# Patient Record
Sex: FEMALE | Race: WHITE | Employment: FULL TIME | URBAN - METROPOLITAN AREA
[De-identification: names, ages, dates, MRNs, and addresses within clinical notes are randomized per-mention and may not be internally consistent; named-entity substitution may affect disease eponyms.]

---

## 2017-02-19 ENCOUNTER — APPOINTMENT (OUTPATIENT)
Dept: GENERAL RADIOLOGY | Age: 22
End: 2017-02-19
Payer: MEDICARE

## 2017-02-19 ENCOUNTER — HOSPITAL ENCOUNTER (EMERGENCY)
Age: 22
Discharge: HOME | End: 2017-02-19
Payer: MEDICARE

## 2017-02-19 VITALS
RESPIRATION RATE: 18 BRPM | SYSTOLIC BLOOD PRESSURE: 112 MMHG | WEIGHT: 105 LBS | HEIGHT: 63 IN | BODY MASS INDEX: 18.61 KG/M2 | OXYGEN SATURATION: 98 % | TEMPERATURE: 98.9 F | HEART RATE: 72 BPM | DIASTOLIC BLOOD PRESSURE: 79 MMHG

## 2017-02-19 DIAGNOSIS — J18.9 PNEUMONIA DUE TO ORGANISM: Primary | ICD-10-CM

## 2017-02-19 LAB — S PYO AG THROAT QL: NEGATIVE

## 2017-02-19 PROCEDURE — 71020 XR CHEST STANDARD TWO VW: CPT

## 2017-02-19 PROCEDURE — 87880 STREP A ASSAY W/OPTIC: CPT

## 2017-02-19 PROCEDURE — 87081 CULTURE SCREEN ONLY: CPT

## 2017-02-19 PROCEDURE — 99283 EMERGENCY DEPT VISIT LOW MDM: CPT

## 2017-02-19 RX ORDER — AZITHROMYCIN 250 MG/1
TABLET, FILM COATED ORAL
Qty: 1 PACKET | Refills: 0 | Status: SHIPPED | OUTPATIENT
Start: 2017-02-19 | End: 2017-07-08

## 2017-02-19 ASSESSMENT — PAIN DESCRIPTION - LOCATION: LOCATION: THROAT

## 2017-02-19 ASSESSMENT — PAIN DESCRIPTION - PAIN TYPE: TYPE: ACUTE PAIN

## 2017-02-19 ASSESSMENT — PAIN SCALES - GENERAL: PAINLEVEL_OUTOF10: 8

## 2017-02-19 ASSESSMENT — PAIN DESCRIPTION - DESCRIPTORS: DESCRIPTORS: SORE

## 2017-02-22 LAB — S PYO THROAT QL CULT: NORMAL

## 2017-06-01 ENCOUNTER — HOSPITAL ENCOUNTER (EMERGENCY)
Age: 22
Discharge: HOME | End: 2017-06-02
Attending: EMERGENCY MEDICINE
Payer: MEDICARE

## 2017-06-01 VITALS
RESPIRATION RATE: 20 BRPM | TEMPERATURE: 98.4 F | OXYGEN SATURATION: 98 % | BODY MASS INDEX: 19.32 KG/M2 | HEART RATE: 91 BPM | SYSTOLIC BLOOD PRESSURE: 118 MMHG | WEIGHT: 105 LBS | HEIGHT: 62 IN | DIASTOLIC BLOOD PRESSURE: 80 MMHG

## 2017-06-01 DIAGNOSIS — N30.01 ACUTE CYSTITIS WITH HEMATURIA: ICD-10-CM

## 2017-06-01 DIAGNOSIS — R10.9 LEFT FLANK PAIN: Primary | ICD-10-CM

## 2017-06-01 DIAGNOSIS — M51.36 DEGENERATIVE DISC DISEASE, LUMBAR: ICD-10-CM

## 2017-06-01 LAB
BILIRUBIN URINE: NEGATIVE
BLOOD, URINE: ABNORMAL
CHP ED QC CHECK: YES
CLARITY: CLEAR
COLOR: YELLOW
GLUCOSE URINE: NEGATIVE MG/DL
KETONES, URINE: NEGATIVE MG/DL
LEUKOCYTE ESTERASE, URINE: ABNORMAL
NITRITE, URINE: NEGATIVE
PH UA: 5 (ref 5–9)
PREGNANCY TEST URINE, POC: NEGATIVE
PROTEIN UA: NEGATIVE MG/DL
SPECIFIC GRAVITY UA: 1.03 (ref 1–1.03)
URINE REFLEX TO CULTURE: YES
UROBILINOGEN, URINE: 0.2 E.U./DL

## 2017-06-01 PROCEDURE — 84703 CHORIONIC GONADOTROPIN ASSAY: CPT

## 2017-06-01 PROCEDURE — 96375 TX/PRO/DX INJ NEW DRUG ADDON: CPT

## 2017-06-01 PROCEDURE — 99284 EMERGENCY DEPT VISIT MOD MDM: CPT

## 2017-06-01 PROCEDURE — 87086 URINE CULTURE/COLONY COUNT: CPT

## 2017-06-01 PROCEDURE — 80053 COMPREHEN METABOLIC PANEL: CPT

## 2017-06-01 PROCEDURE — 6360000002 HC RX W HCPCS: Performed by: EMERGENCY MEDICINE

## 2017-06-01 PROCEDURE — 96374 THER/PROPH/DIAG INJ IV PUSH: CPT

## 2017-06-01 PROCEDURE — 85025 COMPLETE CBC W/AUTO DIFF WBC: CPT

## 2017-06-01 PROCEDURE — 84484 ASSAY OF TROPONIN QUANT: CPT

## 2017-06-01 PROCEDURE — 2580000003 HC RX 258: Performed by: EMERGENCY MEDICINE

## 2017-06-01 PROCEDURE — 81001 URINALYSIS AUTO W/SCOPE: CPT

## 2017-06-01 PROCEDURE — 36415 COLL VENOUS BLD VENIPUNCTURE: CPT

## 2017-06-01 RX ORDER — KETOROLAC TROMETHAMINE 30 MG/ML
30 INJECTION, SOLUTION INTRAMUSCULAR; INTRAVENOUS ONCE
Status: COMPLETED | OUTPATIENT
Start: 2017-06-01 | End: 2017-06-01

## 2017-06-01 RX ORDER — 0.9 % SODIUM CHLORIDE 0.9 %
500 INTRAVENOUS SOLUTION INTRAVENOUS ONCE
Status: COMPLETED | OUTPATIENT
Start: 2017-06-01 | End: 2017-06-02

## 2017-06-01 RX ORDER — ONDANSETRON 2 MG/ML
4 INJECTION INTRAMUSCULAR; INTRAVENOUS ONCE
Status: COMPLETED | OUTPATIENT
Start: 2017-06-01 | End: 2017-06-01

## 2017-06-01 RX ORDER — SODIUM CHLORIDE 0.9 % (FLUSH) 0.9 %
3 SYRINGE (ML) INJECTION EVERY 8 HOURS
Status: DISCONTINUED | OUTPATIENT
Start: 2017-06-01 | End: 2017-06-02 | Stop reason: HOSPADM

## 2017-06-01 RX ADMIN — ONDANSETRON 4 MG: 2 INJECTION INTRAMUSCULAR; INTRAVENOUS at 23:53

## 2017-06-01 RX ADMIN — HYDROMORPHONE HYDROCHLORIDE 0.5 MG: 1 INJECTION, SOLUTION INTRAMUSCULAR; INTRAVENOUS; SUBCUTANEOUS at 23:54

## 2017-06-01 RX ADMIN — SODIUM CHLORIDE 500 ML: 9 INJECTION, SOLUTION INTRAVENOUS at 23:53

## 2017-06-01 RX ADMIN — KETOROLAC TROMETHAMINE 30 MG: 30 INJECTION, SOLUTION INTRAMUSCULAR; INTRAVENOUS at 23:53

## 2017-06-01 ASSESSMENT — PAIN DESCRIPTION - ORIENTATION: ORIENTATION: LEFT

## 2017-06-01 ASSESSMENT — PAIN DESCRIPTION - DESCRIPTORS: DESCRIPTORS: CRAMPING

## 2017-06-01 ASSESSMENT — PAIN SCALES - GENERAL
PAINLEVEL_OUTOF10: 6

## 2017-06-01 ASSESSMENT — PAIN DESCRIPTION - LOCATION: LOCATION: FLANK

## 2017-06-02 ENCOUNTER — APPOINTMENT (OUTPATIENT)
Dept: CT IMAGING | Age: 22
End: 2017-06-02
Payer: MEDICARE

## 2017-06-02 LAB
ALBUMIN SERPL-MCNC: 4.8 G/DL (ref 3.9–4.9)
ALP BLD-CCNC: 61 U/L (ref 40–130)
ALT SERPL-CCNC: 10 U/L (ref 0–33)
ANION GAP SERPL CALCULATED.3IONS-SCNC: 11 MEQ/L (ref 7–13)
AST SERPL-CCNC: 16 U/L (ref 0–35)
BACTERIA: ABNORMAL /HPF
BASOPHILS ABSOLUTE: 0 K/UL (ref 0–0.2)
BASOPHILS RELATIVE PERCENT: 0.4 %
BILIRUB SERPL-MCNC: 0.4 MG/DL (ref 0–1.2)
BUN BLDV-MCNC: 32 MG/DL (ref 6–20)
CALCIUM SERPL-MCNC: 9.3 MG/DL (ref 8.6–10.2)
CHLORIDE BLD-SCNC: 102 MEQ/L (ref 98–107)
CO2: 25 MEQ/L (ref 22–29)
CREAT SERPL-MCNC: 0.61 MG/DL (ref 0.5–0.9)
CRYSTALS, UA: ABNORMAL
EOSINOPHILS ABSOLUTE: 0.1 K/UL (ref 0–0.7)
EOSINOPHILS RELATIVE PERCENT: 2 %
EPITHELIAL CELLS, UA: ABNORMAL /HPF
GFR AFRICAN AMERICAN: >60
GFR NON-AFRICAN AMERICAN: >60
GLOBULIN: 2.1 G/DL (ref 2.3–3.5)
GLUCOSE BLD-MCNC: 88 MG/DL (ref 74–109)
HCG QUALITATIVE: NEGATIVE
HCT VFR BLD CALC: 40.7 % (ref 37–47)
HEMOGLOBIN: 13.7 G/DL (ref 12–16)
LYMPHOCYTES ABSOLUTE: 2.7 K/UL (ref 1–4.8)
LYMPHOCYTES RELATIVE PERCENT: 53.4 %
MCH RBC QN AUTO: 32 PG (ref 27–31.3)
MCHC RBC AUTO-ENTMCNC: 33.8 % (ref 33–37)
MCV RBC AUTO: 94.7 FL (ref 82–100)
MONOCYTES ABSOLUTE: 0.3 K/UL (ref 0.2–0.8)
MONOCYTES RELATIVE PERCENT: 5.7 %
NEUTROPHILS ABSOLUTE: 2 K/UL (ref 1.4–6.5)
NEUTROPHILS RELATIVE PERCENT: 38.5 %
PDW BLD-RTO: 12.2 % (ref 11.5–14.5)
PLATELET # BLD: 200 K/UL (ref 130–400)
POTASSIUM SERPL-SCNC: 4.1 MEQ/L (ref 3.5–5.1)
RBC # BLD: 4.3 M/UL (ref 4.2–5.4)
RBC UA: ABNORMAL /HPF (ref 0–2)
SODIUM BLD-SCNC: 138 MEQ/L (ref 132–144)
TOTAL PROTEIN: 6.9 G/DL (ref 6.4–8.1)
TROPONIN: <0.01 NG/ML (ref 0–0.01)
WBC # BLD: 5.1 K/UL (ref 4.5–11)
WBC UA: ABNORMAL /HPF (ref 0–5)

## 2017-06-02 PROCEDURE — 74176 CT ABD & PELVIS W/O CONTRAST: CPT

## 2017-06-02 RX ORDER — HYDROCODONE BITARTRATE AND ACETAMINOPHEN 5; 325 MG/1; MG/1
1-2 TABLET ORAL EVERY 6 HOURS PRN
Qty: 15 TABLET | Refills: 0 | Status: SHIPPED | OUTPATIENT
Start: 2017-06-02 | End: 2017-06-09

## 2017-06-02 RX ORDER — CYCLOBENZAPRINE HCL 10 MG
5 TABLET ORAL 3 TIMES DAILY PRN
Qty: 21 TABLET | Refills: 0 | Status: SHIPPED | OUTPATIENT
Start: 2017-06-02 | End: 2017-06-09

## 2017-06-02 RX ORDER — CIPROFLOXACIN 500 MG/1
500 TABLET, FILM COATED ORAL 2 TIMES DAILY
Qty: 14 TABLET | Refills: 0 | Status: SHIPPED | OUTPATIENT
Start: 2017-06-02 | End: 2017-06-09

## 2017-06-03 LAB — URINE CULTURE, ROUTINE: NORMAL

## 2017-07-08 ENCOUNTER — HOSPITAL ENCOUNTER (EMERGENCY)
Age: 22
Discharge: HOME | End: 2017-07-08
Attending: EMERGENCY MEDICINE
Payer: MEDICARE

## 2017-07-08 VITALS
RESPIRATION RATE: 17 BRPM | SYSTOLIC BLOOD PRESSURE: 131 MMHG | OXYGEN SATURATION: 100 % | TEMPERATURE: 98.5 F | BODY MASS INDEX: 19.32 KG/M2 | WEIGHT: 105 LBS | HEART RATE: 88 BPM | HEIGHT: 62 IN | DIASTOLIC BLOOD PRESSURE: 92 MMHG

## 2017-07-08 DIAGNOSIS — J20.9 ACUTE BRONCHITIS, UNSPECIFIED ORGANISM: Primary | ICD-10-CM

## 2017-07-08 PROCEDURE — 99282 EMERGENCY DEPT VISIT SF MDM: CPT

## 2017-07-08 RX ORDER — AZITHROMYCIN 250 MG/1
TABLET, FILM COATED ORAL
Qty: 1 PACKET | Refills: 0 | Status: SHIPPED | OUTPATIENT
Start: 2017-07-08 | End: 2017-10-05 | Stop reason: ALTCHOICE

## 2017-07-08 RX ORDER — LORATADINE AND PSEUDOEPHEDRINE 10; 240 MG/1; MG/1
1 TABLET, EXTENDED RELEASE ORAL DAILY
Qty: 12 TABLET | Refills: 0 | Status: SHIPPED | OUTPATIENT
Start: 2017-07-08 | End: 2017-10-05 | Stop reason: ALTCHOICE

## 2017-07-08 ASSESSMENT — PAIN DESCRIPTION - LOCATION: LOCATION: CHEST

## 2017-07-08 ASSESSMENT — PAIN DESCRIPTION - PAIN TYPE: TYPE: ACUTE PAIN

## 2017-07-08 ASSESSMENT — PAIN DESCRIPTION - DESCRIPTORS: DESCRIPTORS: BURNING

## 2017-07-08 ASSESSMENT — PAIN SCALES - GENERAL: PAINLEVEL_OUTOF10: 5

## 2017-09-10 ENCOUNTER — APPOINTMENT (OUTPATIENT)
Dept: GENERAL RADIOLOGY | Age: 22
End: 2017-09-10
Payer: MEDICARE

## 2017-09-10 ENCOUNTER — HOSPITAL ENCOUNTER (EMERGENCY)
Age: 22
Discharge: HOME | End: 2017-09-10
Attending: EMERGENCY MEDICINE
Payer: MEDICARE

## 2017-09-10 VITALS
TEMPERATURE: 98.4 F | WEIGHT: 105 LBS | HEART RATE: 87 BPM | HEIGHT: 63 IN | DIASTOLIC BLOOD PRESSURE: 73 MMHG | BODY MASS INDEX: 18.61 KG/M2 | OXYGEN SATURATION: 99 % | RESPIRATION RATE: 12 BRPM | SYSTOLIC BLOOD PRESSURE: 108 MMHG

## 2017-09-10 DIAGNOSIS — S39.012A LUMBAR STRAIN, INITIAL ENCOUNTER: Primary | ICD-10-CM

## 2017-09-10 LAB
CHP ED QC CHECK: YES
PREGNANCY TEST URINE, POC: NEGATIVE

## 2017-09-10 PROCEDURE — 96372 THER/PROPH/DIAG INJ SC/IM: CPT

## 2017-09-10 PROCEDURE — 6360000002 HC RX W HCPCS: Performed by: EMERGENCY MEDICINE

## 2017-09-10 PROCEDURE — 99283 EMERGENCY DEPT VISIT LOW MDM: CPT

## 2017-09-10 PROCEDURE — 72110 X-RAY EXAM L-2 SPINE 4/>VWS: CPT

## 2017-09-10 RX ORDER — NAPROXEN 500 MG/1
500 TABLET ORAL 2 TIMES DAILY WITH MEALS
Qty: 30 TABLET | Refills: 0 | Status: SHIPPED | OUTPATIENT
Start: 2017-09-10 | End: 2017-10-05 | Stop reason: ALTCHOICE

## 2017-09-10 RX ORDER — KETOROLAC TROMETHAMINE 30 MG/ML
30 INJECTION, SOLUTION INTRAMUSCULAR; INTRAVENOUS ONCE
Status: COMPLETED | OUTPATIENT
Start: 2017-09-10 | End: 2017-09-10

## 2017-09-10 RX ORDER — ORPHENADRINE CITRATE 30 MG/ML
30 INJECTION INTRAMUSCULAR; INTRAVENOUS ONCE
Status: COMPLETED | OUTPATIENT
Start: 2017-09-10 | End: 2017-09-10

## 2017-09-10 RX ORDER — CYCLOBENZAPRINE HCL 10 MG
10 TABLET ORAL 3 TIMES DAILY PRN
Qty: 30 TABLET | Refills: 0 | Status: SHIPPED | OUTPATIENT
Start: 2017-09-10 | End: 2017-09-20

## 2017-09-10 RX ADMIN — KETOROLAC TROMETHAMINE 30 MG: 30 INJECTION, SOLUTION INTRAMUSCULAR at 13:33

## 2017-09-10 RX ADMIN — ORPHENADRINE CITRATE 30 MG: 30 INJECTION INTRAMUSCULAR; INTRAVENOUS at 13:33

## 2017-09-10 ASSESSMENT — PAIN DESCRIPTION - FREQUENCY: FREQUENCY: CONTINUOUS

## 2017-09-10 ASSESSMENT — ENCOUNTER SYMPTOMS
ABDOMINAL PAIN: 0
RHINORRHEA: 0
BACK PAIN: 1
VOMITING: 0
ABDOMINAL DISTENTION: 0
EYE DISCHARGE: 0
SHORTNESS OF BREATH: 0
FACIAL SWELLING: 0
WHEEZING: 0
PHOTOPHOBIA: 0
COLOR CHANGE: 0

## 2017-09-10 ASSESSMENT — PAIN SCALES - GENERAL
PAINLEVEL_OUTOF10: 7
PAINLEVEL_OUTOF10: 3
PAINLEVEL_OUTOF10: 7

## 2017-09-10 ASSESSMENT — PAIN DESCRIPTION - ORIENTATION: ORIENTATION: MID;LOWER

## 2017-09-10 ASSESSMENT — PAIN DESCRIPTION - PAIN TYPE
TYPE: ACUTE PAIN
TYPE: ACUTE PAIN

## 2017-09-10 ASSESSMENT — PAIN DESCRIPTION - LOCATION
LOCATION: BACK
LOCATION: BACK

## 2017-09-10 ASSESSMENT — PAIN DESCRIPTION - DESCRIPTORS: DESCRIPTORS: SHARP

## 2017-09-10 ASSESSMENT — PAIN - FUNCTIONAL ASSESSMENT: PAIN_FUNCTIONAL_ASSESSMENT: 0-10

## 2017-09-10 ASSESSMENT — PAIN DESCRIPTION - DIRECTION: RADIATING_TOWARDS: UP SPINE

## 2017-09-10 ASSESSMENT — PAIN DESCRIPTION - ONSET: ONSET: ON-GOING

## 2017-10-05 ENCOUNTER — OFFICE VISIT (OUTPATIENT)
Dept: FAMILY MEDICINE CLINIC | Age: 22
End: 2017-10-05

## 2017-10-05 VITALS
HEART RATE: 65 BPM | BODY MASS INDEX: 18.29 KG/M2 | SYSTOLIC BLOOD PRESSURE: 118 MMHG | OXYGEN SATURATION: 98 % | TEMPERATURE: 97.7 F | DIASTOLIC BLOOD PRESSURE: 86 MMHG | HEIGHT: 63 IN | WEIGHT: 103.2 LBS

## 2017-10-05 DIAGNOSIS — F41.9 ANXIETY: Primary | ICD-10-CM

## 2017-10-05 DIAGNOSIS — F32.A DEPRESSION, UNSPECIFIED DEPRESSION TYPE: ICD-10-CM

## 2017-10-05 DIAGNOSIS — Z23 NEED FOR INFLUENZA VACCINATION: ICD-10-CM

## 2017-10-05 PROCEDURE — G0008 ADMIN INFLUENZA VIRUS VAC: HCPCS | Performed by: NURSE PRACTITIONER

## 2017-10-05 PROCEDURE — 1036F TOBACCO NON-USER: CPT | Performed by: NURSE PRACTITIONER

## 2017-10-05 PROCEDURE — G8484 FLU IMMUNIZE NO ADMIN: HCPCS | Performed by: NURSE PRACTITIONER

## 2017-10-05 PROCEDURE — 99203 OFFICE O/P NEW LOW 30 MIN: CPT | Performed by: NURSE PRACTITIONER

## 2017-10-05 PROCEDURE — G8419 CALC BMI OUT NRM PARAM NOF/U: HCPCS | Performed by: NURSE PRACTITIONER

## 2017-10-05 PROCEDURE — G8427 DOCREV CUR MEDS BY ELIG CLIN: HCPCS | Performed by: NURSE PRACTITIONER

## 2017-10-05 PROCEDURE — 90688 IIV4 VACCINE SPLT 0.5 ML IM: CPT | Performed by: NURSE PRACTITIONER

## 2017-10-05 RX ORDER — NORETHINDRONE ACETATE AND ETHINYL ESTRADIOL 1; 20 MG/1; UG/1
TABLET ORAL
COMMUNITY
Start: 2017-07-18 | End: 2018-06-28

## 2017-10-05 ASSESSMENT — PATIENT HEALTH QUESTIONNAIRE - PHQ9
1. LITTLE INTEREST OR PLEASURE IN DOING THINGS: 1
2. FEELING DOWN, DEPRESSED OR HOPELESS: 1
SUM OF ALL RESPONSES TO PHQ QUESTIONS 1-9: 2
SUM OF ALL RESPONSES TO PHQ9 QUESTIONS 1 & 2: 2

## 2017-10-05 ASSESSMENT — ENCOUNTER SYMPTOMS
SHORTNESS OF BREATH: 0
COUGH: 0

## 2017-10-05 NOTE — PROGRESS NOTES
1 tablet by mouth every 6 hours as needed for Pain 20 tablet 0     No current facility-administered medications on file prior to visit. Allergies   Allergen Reactions    Penicillins Hives       Review of Systems   Constitutional: Negative for activity change and fatigue. Respiratory: Negative for cough and shortness of breath. Cardiovascular: Negative for chest pain. Psychiatric/Behavioral: Negative for self-injury. The patient is nervous/anxious. Objective  Vitals:    10/05/17 0928   BP: 118/86   Site: Right Arm   Position: Sitting   Cuff Size: Medium Adult   Pulse: 65   Temp: 97.7 °F (36.5 °C)   SpO2: 98%   Weight: 103 lb 3.2 oz (46.8 kg)   Height: 5' 3\" (1.6 m)     Physical Exam   Constitutional: She is oriented to person, place, and time. She appears well-developed and well-nourished. HENT:   Head: Normocephalic and atraumatic. Eyes: EOM are normal.   Cardiovascular: Normal rate and regular rhythm. Pulmonary/Chest: Effort normal and breath sounds normal.   Neurological: She is alert and oriented to person, place, and time. Psychiatric: She has a normal mood and affect. Her behavior is normal. Judgment and thought content normal.     Assessment & Plan    1. Anxiety  Referral To Unknown External Psychology    sertraline (ZOLOFT) 50 MG tablet   2. Depression, unspecified depression type  Referral To Unknown External Psychology    sertraline (ZOLOFT) 50 MG tablet   3.  Need for influenza vaccination  INFLUENZA, QUADV, 3 YRS AND OLDER, IM, MDV, 0.5ML (Donnise Deepthi)       Orders Placed This Encounter   Procedures    INFLUENZA, QUADV, 3 YRS AND OLDER, IM, MDV, 0.5ML (Donnise Deepthi)    Referral To Unknown External Psychology     Referral Priority:   Routine     Referral Type:   Consult for Advice and Opinion     Requested Specialty:   Psychology     Number of Visits Requested:   1       Orders Placed This Encounter   Medications    sertraline (ZOLOFT) 50 MG tablet     Sig: Take 1 tablet by mouth daily     Dispense:  30 tablet     Refill:  3     Side effects, adverse effects of the medication prescribed today, as well as treatment plan/ rationale and result expectations have been discussed with the patient who expresses understanding and desires to proceed. Close follow up to evaluate treatment results and for coordination of care. I have reviewed the patient's medical history in detail and updated the computerized patient record. As always, patient is advised that if symptoms worsen in any way they will proceed to the nearest emergency room. Return in about 4 weeks (around 11/2/2017).     Lucas Reyna NP

## 2017-10-05 NOTE — MR AVS SNAPSHOT
After Visit Summary             Corrie Simmons   10/5/2017 9:30 AM   Office Visit    Description:  Female : 1995   Provider:  Marky Loomis NP   Department:  [facility]              Your Follow-Up and Future Appointments         Below is a list of your follow-up and future appointments. This may not be a complete list as you may have made appointments directly with providers that we are not aware of or your providers may have made some for you. Please call your providers to confirm appointments. It is important to keep your appointments. Please bring your current insurance card, photo ID, co-pay, and all medication bottles to your appointment. If self-pay, payment is expected at the time of service. Your To-Do List     Future Appointments Provider Department Dept Phone    2017 9:00 AM LOVE Rojo [phone]    Please arrive 15 minutes prior to appointment, bring photo ID and insurance card. Follow-Up    Return in about 4 weeks (around 2017). Information from Your Visit        Department     Name Address Phone Fax    [facility] [address] [place] [address], [address] [place] [address], [address]  [facility] [phone]      You Were Seen for:         Comments    Anxiety   [[id number]]         Vital Signs     Blood Pressure Pulse Temperature Height Weight Last Menstrual Period    118/86 (Site: Right Arm, Position: Sitting, Cuff Size: Medium Adult) 65 97.7 °F (36.5 °C) 5' 3\" (1.6 m) 103 lb 3.2 oz (46.8 kg) 2017    Oxygen Saturation Body Mass Index Smoking Status             98% 18.28 kg/m2 Never Smoker         Additional Information about your Body Mass Index (BMI)           Your BMI as listed above is considered underweight (less than 18.5). BMI is an estimate of body fat, calculated from your height and weight. Risks of low BMI include increased risk for infection, anemia, and bone loss. BMI is not perfect. It may underestimate body fat in older persons and others who have lost muscle. If your body fat is low you can improve your BMI by increasing your calorie intake and building muscle through strength training. Learn more at: https://familydoctor. org/healthy-ways-to-gain-weight-if-youre-underweight/             Today's Medication Changes          These changes are accurate as of: 10/5/17  9:57 AM.  If you have any questions, ask your nurse or doctor. START taking these medications           sertraline 50 MG tablet   Commonly known as:  ZOLOFT   Instructions:   Take 1 tablet by mouth daily   Quantity:  30 tablet   Refills:  3   Started by:  Rena Gillis NP         STOP taking these medications           azithromycin 250 MG tablet   Commonly known as:  ZITHROMAX   Stopped by:  Rena Gillis NP       JUNEL FE 1/20 1-20 MG-MCG per tablet   Generic drug:  norethindrone-ethinyl estradiol   Stopped by:  Rena Gillis NP       loratadine-pseudoephedrine  MG per extended release tablet   Commonly known as:  CLARITIN-D 24 HOUR   Stopped by:  Rena Gillis NP       naproxen 500 MG tablet   Commonly known as:  NAPROSYN   Stopped by:  Rena Gillis NP       promethazine-codeine 6.25-10 MG/5ML syrup   Commonly known as:  PHENERGAN with CODEINE   Stopped by:  Rena Gillis NP       sodium chloride 0.65 % nasal spray   Commonly known as:  OCEAN   Stopped by:  Rena Gillis NP       Brody Shires 150-35 MCG/24HR   Generic drug:  norelgestromin-ethinyl estradiol   Stopped by:  Rena Gillis NP            Where to Get Your Medications      These medications were sent to [address] [place] [address] [address] [facility] [place], [address] [place] [address]  [address] [facility] [address] [address]     Phone:  [phone]     sertraline 50 MG tablet               Your Current Medications Are              sertraline (ZOLOFT) 50 MG tablet Take 1 tablet by mouth daily

## 2017-11-07 ENCOUNTER — OFFICE VISIT (OUTPATIENT)
Dept: FAMILY MEDICINE CLINIC | Age: 22
End: 2017-11-07

## 2017-11-07 VITALS
BODY MASS INDEX: 17.55 KG/M2 | DIASTOLIC BLOOD PRESSURE: 60 MMHG | OXYGEN SATURATION: 97 % | HEART RATE: 70 BPM | HEIGHT: 62 IN | WEIGHT: 95.4 LBS | SYSTOLIC BLOOD PRESSURE: 100 MMHG | TEMPERATURE: 96.6 F

## 2017-11-07 DIAGNOSIS — F41.9 ANXIETY: Primary | ICD-10-CM

## 2017-11-07 PROCEDURE — 99213 OFFICE O/P EST LOW 20 MIN: CPT | Performed by: NURSE PRACTITIONER

## 2017-11-07 PROCEDURE — G8419 CALC BMI OUT NRM PARAM NOF/U: HCPCS | Performed by: NURSE PRACTITIONER

## 2017-11-07 PROCEDURE — G8484 FLU IMMUNIZE NO ADMIN: HCPCS | Performed by: NURSE PRACTITIONER

## 2017-11-07 PROCEDURE — 1036F TOBACCO NON-USER: CPT | Performed by: NURSE PRACTITIONER

## 2017-11-07 PROCEDURE — G8427 DOCREV CUR MEDS BY ELIG CLIN: HCPCS | Performed by: NURSE PRACTITIONER

## 2017-11-07 RX ORDER — SERTRALINE HYDROCHLORIDE 100 MG/1
100 TABLET, FILM COATED ORAL DAILY
Qty: 30 TABLET | Refills: 5 | Status: SHIPPED | OUTPATIENT
Start: 2017-11-07 | End: 2018-06-28

## 2017-12-06 ENCOUNTER — OFFICE VISIT (OUTPATIENT)
Dept: FAMILY MEDICINE CLINIC | Age: 22
End: 2017-12-06

## 2017-12-06 VITALS
DIASTOLIC BLOOD PRESSURE: 70 MMHG | SYSTOLIC BLOOD PRESSURE: 102 MMHG | HEART RATE: 67 BPM | HEIGHT: 62 IN | WEIGHT: 103.4 LBS | BODY MASS INDEX: 19.03 KG/M2 | TEMPERATURE: 97.5 F

## 2017-12-06 DIAGNOSIS — J06.9 VIRAL URI: ICD-10-CM

## 2017-12-06 DIAGNOSIS — F41.9 ANXIETY: Primary | ICD-10-CM

## 2017-12-06 PROCEDURE — G8420 CALC BMI NORM PARAMETERS: HCPCS | Performed by: NURSE PRACTITIONER

## 2017-12-06 PROCEDURE — 99212 OFFICE O/P EST SF 10 MIN: CPT | Performed by: NURSE PRACTITIONER

## 2017-12-06 PROCEDURE — G8484 FLU IMMUNIZE NO ADMIN: HCPCS | Performed by: NURSE PRACTITIONER

## 2017-12-06 PROCEDURE — G8427 DOCREV CUR MEDS BY ELIG CLIN: HCPCS | Performed by: NURSE PRACTITIONER

## 2017-12-06 PROCEDURE — 1036F TOBACCO NON-USER: CPT | Performed by: NURSE PRACTITIONER

## 2017-12-06 ASSESSMENT — ENCOUNTER SYMPTOMS
COUGH: 0
DIARRHEA: 0
ABDOMINAL PAIN: 0
SHORTNESS OF BREATH: 0
RHINORRHEA: 1
SORE THROAT: 1
NAUSEA: 0

## 2017-12-06 NOTE — PROGRESS NOTES
Subjective  Chief Complaint   Patient presents with    1 Month Follow-Up     med    Medication Check     Zoloft, pt states that bruising started so she started Vit C.     Anxiety     pt in last month and medication started.  URI     yesterday       HPI     Anxiety  Has been feeling a lot better with the Zoloft 100 mg  Was getting a lot of bruising on her legs, started vitamin C and the bruises started going away.      URI  Sore throat, congestion, runny nose x 1 days  No cough, fever, chills, headache, ear pain  Has not tried anything to tx the symptoms      Patient Active Problem List    Diagnosis Date Noted    Hodgkin's lymphoma ([facility] in remission 06/05/2014    Cough variant asthma 02/06/2014    Anemia 08/27/2013     Past Medical History:   Diagnosis Date    Cancer ([facility]     Cough variant asthma 2/6/2014     Past Surgical History:   Procedure Laterality Date    BREAST FIBROADENOMA SURGERY  01/2013    TYMPANOSTOMY TUBE PLACEMENT  1997     Family History   Problem Relation Age of Onset    High Blood Pressure Mother     High Blood Pressure Father     High Blood Pressure Maternal Aunt     High Blood Pressure Maternal Grandmother     High Blood Pressure Maternal Grandfather     Diabetes Other      mom's side    Cancer Neg Hx     Breast Cancer Neg Hx     Prostate Cancer Neg Hx     Depression Neg Hx      Social History     Social History    Marital status: Single     Spouse name: N/A    Number of children: N/A    Years of education: N/A     Social History Main Topics    Smoking status: Never Smoker    Smokeless tobacco: Never Used    Alcohol use No    Drug use: No    Sexual activity: No     Other Topics Concern    Not on file     Social History Narrative    No narrative on file     Current Outpatient Prescriptions on File Prior to Visit   Medication Sig Dispense Refill    sertraline (ZOLOFT) 100 MG tablet Take 1 tablet by mouth daily 30 tablet 5    JUNEL 1/20 1-20 MG-MCG per tablet as well as treatment plan/ rationale and result expectations have been discussed with the patient who expresses understanding and desires to proceed. Close follow up to evaluate treatment results and for coordination of care. I have reviewed the patient's medical history in detail and updated the computerized patient record. As always, patient is advised that if symptoms worsen in any way they will proceed to the nearest emergency room. Return if symptoms worsen or fail to improve.     Anastasiya Shaver, LOVE

## 2018-06-28 ENCOUNTER — HOSPITAL ENCOUNTER (EMERGENCY)
Age: 23
Discharge: HOME | End: 2018-06-28
Payer: MEDICARE

## 2018-06-28 VITALS
OXYGEN SATURATION: 100 % | HEART RATE: 82 BPM | DIASTOLIC BLOOD PRESSURE: 83 MMHG | SYSTOLIC BLOOD PRESSURE: 121 MMHG | RESPIRATION RATE: 18 BRPM | TEMPERATURE: 98 F

## 2018-06-28 DIAGNOSIS — Z32.01 PREGNANCY TEST PERFORMED, PREGNANCY CONFIRMED: Primary | ICD-10-CM

## 2018-06-28 LAB
BILIRUBIN URINE: NEGATIVE
BLOOD, URINE: NEGATIVE
CLARITY: CLEAR
COLOR: YELLOW
GLUCOSE URINE: NEGATIVE MG/DL
GONADOTROPIN, CHORIONIC (HCG) QUANT: 986 MIU/ML
KETONES, URINE: ABNORMAL MG/DL
LEUKOCYTE ESTERASE, URINE: NEGATIVE
NITRITE, URINE: NEGATIVE
PH UA: 6.5 (ref 5–9)
PROTEIN UA: NEGATIVE MG/DL
SPECIFIC GRAVITY UA: 1.02 (ref 1–1.03)
URINE REFLEX TO CULTURE: ABNORMAL
UROBILINOGEN, URINE: 0.2 E.U./DL

## 2018-06-28 PROCEDURE — 81003 URINALYSIS AUTO W/O SCOPE: CPT

## 2018-06-28 PROCEDURE — 84702 CHORIONIC GONADOTROPIN TEST: CPT

## 2018-06-28 PROCEDURE — 99283 EMERGENCY DEPT VISIT LOW MDM: CPT

## 2018-06-28 PROCEDURE — 36415 COLL VENOUS BLD VENIPUNCTURE: CPT

## 2018-06-28 RX ORDER — PNV NO.95/FERROUS FUM/FOLIC AC 28MG-0.8MG
1 TABLET ORAL DAILY
Qty: 30 TABLET | Refills: 2 | Status: SHIPPED | OUTPATIENT
Start: 2018-06-28 | End: 2018-07-17

## 2018-06-28 ASSESSMENT — ENCOUNTER SYMPTOMS
ABDOMINAL PAIN: 0
COUGH: 0
BACK PAIN: 0
SHORTNESS OF BREATH: 0

## 2018-06-28 NOTE — ED PROVIDER NOTES
[facility] ED  eMERGENCY dEPARTMENT eNCOUnter      Pt Name: Ricci Scheuermann  MRN: [id number]  [place] 1995  Date of evaluation: 6/28/2018  Provider: ADRIAN Kessler CNP      HISTORY OF PRESENT ILLNESS    Ricci Scheuermann is a 25 y.o. female who presents to the Emergency Department with request for blood test for pregnancy. She took 2 home pregnancy tests that were positive. She denies any abdominal pain, vaginal bleeding or cramping. She does have some nausea, but is not having trouble eating or drinking. LMP 5/26/18. REVIEW OF SYSTEMS       Review of Systems   Constitutional: Negative for fever. HENT: Negative for congestion. Respiratory: Negative for cough and shortness of breath. Cardiovascular: Negative for chest pain. Gastrointestinal: Negative for abdominal pain. Genitourinary: Negative for difficulty urinating, dysuria, flank pain, frequency, hematuria, pelvic pain, urgency, vaginal bleeding and vaginal pain. Musculoskeletal: Negative for arthralgias and back pain. Skin: Negative for rash. All other systems reviewed and are negative.         PAST MEDICAL HISTORY     Past Medical History:   Diagnosis Date    Cancer ([address]     Cough variant asthma 2/6/2014         SURGICAL HISTORY       Past Surgical History:   Procedure Laterality Date    BREAST FIBROADENOMA SURGERY  01/2013    TYMPANOSTOMY TUBE PLACEMENT  1997         CURRENT MEDICATIONS       Previous Medications    ASCORBIC ACID (VITAMIN C GUMMIES PO)    Take by mouth       ALLERGIES     Penicillins    FAMILY HISTORY       Family History   Problem Relation Age of Onset    High Blood Pressure Mother     High Blood Pressure Father     High Blood Pressure Maternal Aunt     High Blood Pressure Maternal Grandmother     High Blood Pressure Maternal Grandfather     Diabetes Other         mom's side    Cancer Neg Hx     Breast Cancer Neg Hx     Prostate Cancer Neg Hx     Depression Neg Hx

## 2018-07-09 ENCOUNTER — HOSPITAL ENCOUNTER (EMERGENCY)
Age: 23
Discharge: HOME | End: 2018-07-09
Attending: EMERGENCY MEDICINE

## 2018-07-09 ENCOUNTER — TELEPHONE (OUTPATIENT)
Dept: OBGYN CLINIC | Age: 23
End: 2018-07-09

## 2018-07-09 VITALS
DIASTOLIC BLOOD PRESSURE: 84 MMHG | WEIGHT: 102 LBS | HEIGHT: 62 IN | RESPIRATION RATE: 12 BRPM | BODY MASS INDEX: 18.77 KG/M2 | OXYGEN SATURATION: 100 % | TEMPERATURE: 98.7 F | SYSTOLIC BLOOD PRESSURE: 112 MMHG | HEART RATE: 105 BPM

## 2018-07-09 DIAGNOSIS — N91.2 AMENORRHEA: Primary | ICD-10-CM
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
## 2018-07-09 DIAGNOSIS — S16.1XXA STRAIN OF NECK MUSCLE, INITIAL ENCOUNTER: ICD-10-CM

## 2018-07-09 PROCEDURE — 99283 EMERGENCY DEPT VISIT LOW MDM: CPT

## 2018-07-09 ASSESSMENT — PAIN DESCRIPTION - ORIENTATION: ORIENTATION: ANTERIOR

## 2018-07-09 ASSESSMENT — ENCOUNTER SYMPTOMS
VOMITING: 0
EYE PAIN: 0
SHORTNESS OF BREATH: 0
ABDOMINAL PAIN: 0
NAUSEA: 0
CHEST TIGHTNESS: 0
SORE THROAT: 0

## 2018-07-09 ASSESSMENT — PAIN DESCRIPTION - DESCRIPTORS: DESCRIPTORS: HEADACHE

## 2018-07-09 ASSESSMENT — PAIN DESCRIPTION - FREQUENCY: FREQUENCY: CONTINUOUS

## 2018-07-09 ASSESSMENT — PAIN SCALES - GENERAL: PAINLEVEL_OUTOF10: 2

## 2018-07-09 ASSESSMENT — PAIN DESCRIPTION - LOCATION: LOCATION: HEAD

## 2018-07-09 ASSESSMENT — PAIN DESCRIPTION - PROGRESSION: CLINICAL_PROGRESSION: GRADUALLY WORSENING

## 2018-07-09 ASSESSMENT — PAIN DESCRIPTION - PAIN TYPE: TYPE: ACUTE PAIN

## 2018-07-09 NOTE — ED PROVIDER NOTES
Psychiatric/Behavioral: Negative for confusion and sleep disturbance. Except as noted above the remainder of the review of systems was reviewed and negative. PAST MEDICAL HISTORY     Past Medical History:   Diagnosis Date    Cough variant asthma 2/6/2014    Hodgkin disease ([facility]     Hodgkin's         SURGICAL HISTORY       Past Surgical History:   Procedure Laterality Date    BREAST FIBROADENOMA SURGERY  01/2013    TYMPANOSTOMY TUBE PLACEMENT  1997         CURRENT MEDICATIONS       Previous Medications    ASCORBIC ACID (VITAMIN C GUMMIES PO)    Take by mouth    PRENATAL VIT-FE FUMARATE-FA (PRENATAL VITAMIN) 27-0.8 MG TABS    Take 1 tablet by mouth daily       ALLERGIES     Penicillins    FAMILY HISTORY       Family History   Problem Relation Age of Onset    High Blood Pressure Mother     High Blood Pressure Father     High Blood Pressure Maternal Aunt     High Blood Pressure Maternal Grandmother     High Blood Pressure Maternal Grandfather     Diabetes Other         mom's side    Cancer Neg Hx     Breast Cancer Neg Hx     Prostate Cancer Neg Hx     Depression Neg Hx           SOCIAL HISTORY       Social History     Social History    Marital status: Single     Spouse name: N/A    Number of children: N/A    Years of education: N/A     Social History Main Topics    Smoking status: Never Smoker    Smokeless tobacco: Never Used    Alcohol use No    Drug use: No    Sexual activity: Not Asked     Other Topics Concern    None     Social History Narrative    None       SCREENINGS             PHYSICAL EXAM    (up to 7 for level 4, 8 or more for level 5)     ED Triage Vitals [07/09/18 0909]   BP Temp Temp Source Pulse Resp SpO2 Height Weight   112/84 98.7 °F (37.1 °C) Oral 105 12 100 % 5' 2\" (1.575 m) 102 lb (46.3 kg)       Physical Exam   Constitutional: She is oriented to person, place, and time. She appears well-developed and well-nourished. No distress.    HENT:   Head: Normocephalic
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
## 2018-07-12 ENCOUNTER — APPOINTMENT (OUTPATIENT)
Dept: ULTRASOUND IMAGING | Age: 23
End: 2018-07-12

## 2018-07-12 ENCOUNTER — HOSPITAL ENCOUNTER (EMERGENCY)
Age: 23
Discharge: HOME | End: 2018-07-12

## 2018-07-12 VITALS
SYSTOLIC BLOOD PRESSURE: 122 MMHG | TEMPERATURE: 98 F | HEART RATE: 88 BPM | WEIGHT: 100 LBS | OXYGEN SATURATION: 100 % | DIASTOLIC BLOOD PRESSURE: 84 MMHG | BODY MASS INDEX: 18.4 KG/M2 | RESPIRATION RATE: 16 BRPM | HEIGHT: 62 IN

## 2018-07-12 DIAGNOSIS — O46.90 VAGINAL BLEEDING IN PREGNANCY: Primary | ICD-10-CM

## 2018-07-12 DIAGNOSIS — O20.0 THREATENED MISCARRIAGE: ICD-10-CM

## 2018-07-12 LAB
ABO/RH: NORMAL
ALBUMIN SERPL-MCNC: 4.4 G/DL (ref 3.9–4.9)
ALP BLD-CCNC: 58 U/L (ref 40–130)
ALT SERPL-CCNC: 9 U/L (ref 0–33)
ANION GAP SERPL CALCULATED.3IONS-SCNC: 14 MEQ/L (ref 7–13)
AST SERPL-CCNC: 14 U/L (ref 0–35)
BASOPHILS ABSOLUTE: 0 K/UL (ref 0–0.2)
BASOPHILS RELATIVE PERCENT: 0.3 %
BILIRUB SERPL-MCNC: 0.4 MG/DL (ref 0–1.2)
BILIRUBIN URINE: NEGATIVE
BLOOD, URINE: ABNORMAL
BUN BLDV-MCNC: 22 MG/DL (ref 6–20)
CALCIUM SERPL-MCNC: 9.6 MG/DL (ref 8.6–10.2)
CHLORIDE BLD-SCNC: 103 MEQ/L (ref 98–107)
CHP ED QC CHECK: YES
CHP ED QC CHECK: YES
CLARITY: CLEAR
CLUE CELLS: NORMAL
CO2: 23 MEQ/L (ref 22–29)
COLOR: YELLOW
CREAT SERPL-MCNC: 0.53 MG/DL (ref 0.5–0.9)
EOSINOPHILS ABSOLUTE: 0.1 K/UL (ref 0–0.7)
EOSINOPHILS RELATIVE PERCENT: 1.5 %
EPITHELIAL CELLS, UA: ABNORMAL /HPF
GFR AFRICAN AMERICAN: >60
GFR NON-AFRICAN AMERICAN: >60
GLOBULIN: 2.6 G/DL (ref 2.3–3.5)
GLUCOSE BLD-MCNC: 82 MG/DL (ref 74–109)
GLUCOSE URINE: NEGATIVE MG/DL
GONADOTROPIN, CHORIONIC (HCG) QUANT: 826 MIU/ML
HCG QUALITATIVE: POSITIVE
HCT VFR BLD CALC: 37.3 % (ref 37–47)
HEMOGLOBIN: 12.9 G/DL (ref 12–16)
KETONES, URINE: 15 MG/DL
LEUKOCYTE ESTERASE, URINE: NEGATIVE
LYMPHOCYTES ABSOLUTE: 1.6 K/UL (ref 1–4.8)
LYMPHOCYTES RELATIVE PERCENT: 27.7 %
MCH RBC QN AUTO: 32.8 PG (ref 27–31.3)
MCHC RBC AUTO-ENTMCNC: 34.7 % (ref 33–37)
MCV RBC AUTO: 94.6 FL (ref 82–100)
MONOCYTES ABSOLUTE: 0.4 K/UL (ref 0.2–0.8)
MONOCYTES RELATIVE PERCENT: 7.4 %
NEUTROPHILS ABSOLUTE: 3.6 K/UL (ref 1.4–6.5)
NEUTROPHILS RELATIVE PERCENT: 63.1 %
NITRITE, URINE: NEGATIVE
PDW BLD-RTO: 12.2 % (ref 11.5–14.5)
PH UA: 5 (ref 5–9)
PLATELET # BLD: 189 K/UL (ref 130–400)
POTASSIUM SERPL-SCNC: 3.9 MEQ/L (ref 3.5–5.1)
PREGNANCY TEST URINE, POC: NORMAL
PREGNANCY TEST URINE, POC: POSITIVE
PROTEIN UA: ABNORMAL MG/DL
RBC # BLD: 3.94 M/UL (ref 4.2–5.4)
RBC UA: ABNORMAL /HPF (ref 0–2)
SODIUM BLD-SCNC: 140 MEQ/L (ref 132–144)
SPECIFIC GRAVITY UA: 1.03 (ref 1–1.03)
TOTAL PROTEIN: 7 G/DL (ref 6.4–8.1)
TRICHOMONAS PREP: NORMAL
TRICHOMONAS VAGINALIS SCREEN: NEGATIVE
URINE REFLEX TO CULTURE: YES
UROBILINOGEN, URINE: 0.2 E.U./DL
WBC # BLD: 5.7 K/UL (ref 4.8–10.8)
WBC UA: ABNORMAL /HPF (ref 0–5)
YEAST WET PREP: NORMAL

## 2018-07-12 PROCEDURE — 86900 BLOOD TYPING SEROLOGIC ABO: CPT

## 2018-07-12 PROCEDURE — 80053 COMPREHEN METABOLIC PANEL: CPT

## 2018-07-12 PROCEDURE — 76801 OB US < 14 WKS SINGLE FETUS: CPT

## 2018-07-12 PROCEDURE — 87210 SMEAR WET MOUNT SALINE/INK: CPT

## 2018-07-12 PROCEDURE — 86901 BLOOD TYPING SEROLOGIC RH(D): CPT

## 2018-07-12 PROCEDURE — 87591 N.GONORRHOEAE DNA AMP PROB: CPT

## 2018-07-12 PROCEDURE — 76817 TRANSVAGINAL US OBSTETRIC: CPT

## 2018-07-12 PROCEDURE — 81001 URINALYSIS AUTO W/SCOPE: CPT

## 2018-07-12 PROCEDURE — 87491 CHLMYD TRACH DNA AMP PROBE: CPT

## 2018-07-12 PROCEDURE — 99284 EMERGENCY DEPT VISIT MOD MDM: CPT

## 2018-07-12 PROCEDURE — 87086 URINE CULTURE/COLONY COUNT: CPT

## 2018-07-12 PROCEDURE — 85025 COMPLETE CBC W/AUTO DIFF WBC: CPT

## 2018-07-12 PROCEDURE — 84702 CHORIONIC GONADOTROPIN TEST: CPT

## 2018-07-12 PROCEDURE — 36415 COLL VENOUS BLD VENIPUNCTURE: CPT

## 2018-07-12 PROCEDURE — 87660 TRICHOMONAS VAGIN DIR PROBE: CPT

## 2018-07-12 PROCEDURE — 84703 CHORIONIC GONADOTROPIN ASSAY: CPT

## 2018-07-12 RX ORDER — SODIUM CHLORIDE 9 MG/ML
INJECTION, SOLUTION INTRAVENOUS
Status: DISCONTINUED
Start: 2018-07-12 | End: 2018-07-12 | Stop reason: HOSPADM

## 2018-07-12 ASSESSMENT — ENCOUNTER SYMPTOMS
CONSTIPATION: 0
EYE DISCHARGE: 0
SORE THROAT: 0
RHINORRHEA: 0
BACK PAIN: 0
SHORTNESS OF BREATH: 0
COLOR CHANGE: 0
ABDOMINAL DISTENTION: 0
ABDOMINAL PAIN: 0

## 2018-07-12 ASSESSMENT — PAIN DESCRIPTION - DESCRIPTORS: DESCRIPTORS: CRAMPING

## 2018-07-12 ASSESSMENT — PAIN DESCRIPTION - LOCATION: LOCATION: ABDOMEN

## 2018-07-12 ASSESSMENT — PAIN SCALES - GENERAL: PAINLEVEL_OUTOF10: 7

## 2018-07-12 ASSESSMENT — PAIN DESCRIPTION - PAIN TYPE: TYPE: ACUTE PAIN
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
## 2018-07-14 LAB — URINE CULTURE, ROUTINE: NORMAL

## 2018-07-15 ENCOUNTER — HOSPITAL ENCOUNTER (EMERGENCY)
Age: 23
Discharge: HOME | End: 2018-07-15
Attending: EMERGENCY MEDICINE

## 2018-07-15 VITALS
TEMPERATURE: 98.4 F | WEIGHT: 101 LBS | HEART RATE: 96 BPM | RESPIRATION RATE: 18 BRPM | SYSTOLIC BLOOD PRESSURE: 103 MMHG | DIASTOLIC BLOOD PRESSURE: 56 MMHG | BODY MASS INDEX: 18.47 KG/M2 | OXYGEN SATURATION: 96 %

## 2018-07-15 DIAGNOSIS — O20.0 THREATENED MISCARRIAGE IN EARLY PREGNANCY: Primary | ICD-10-CM

## 2018-07-15 LAB
ANION GAP SERPL CALCULATED.3IONS-SCNC: 10 MEQ/L (ref 7–13)
BASOPHILS ABSOLUTE: 0 K/UL (ref 0–0.2)
BASOPHILS RELATIVE PERCENT: 0.6 %
BUN BLDV-MCNC: 25 MG/DL (ref 6–20)
CALCIUM SERPL-MCNC: 9.1 MG/DL (ref 8.6–10.2)
CHLORIDE BLD-SCNC: 101 MEQ/L (ref 98–107)
CO2: 26 MEQ/L (ref 22–29)
CREAT SERPL-MCNC: 0.67 MG/DL (ref 0.5–0.9)
EOSINOPHILS ABSOLUTE: 0.2 K/UL (ref 0–0.7)
EOSINOPHILS RELATIVE PERCENT: 2.8 %
GFR AFRICAN AMERICAN: >60
GFR NON-AFRICAN AMERICAN: >60
GLUCOSE BLD-MCNC: 98 MG/DL (ref 74–109)
GONADOTROPIN, CHORIONIC (HCG) QUANT: 719.9 MIU/ML
HCT VFR BLD CALC: 37.4 % (ref 37–47)
HEMOGLOBIN: 12.9 G/DL (ref 12–16)
INR BLD: 1
LYMPHOCYTES ABSOLUTE: 2 K/UL (ref 1–4.8)
LYMPHOCYTES RELATIVE PERCENT: 36.7 %
MCH RBC QN AUTO: 32.5 PG (ref 27–31.3)
MCHC RBC AUTO-ENTMCNC: 34.6 % (ref 33–37)
MCV RBC AUTO: 93.8 FL (ref 82–100)
MONOCYTES ABSOLUTE: 0.6 K/UL (ref 0.2–0.8)
MONOCYTES RELATIVE PERCENT: 10.5 %
NEUTROPHILS ABSOLUTE: 2.7 K/UL (ref 1.4–6.5)
NEUTROPHILS RELATIVE PERCENT: 49.4 %
PDW BLD-RTO: 12.1 % (ref 11.5–14.5)
PLATELET # BLD: 192 K/UL (ref 130–400)
POTASSIUM SERPL-SCNC: 3.9 MEQ/L (ref 3.5–5.1)
PROTHROMBIN TIME: 10.7 SEC (ref 9.6–12.3)
RBC # BLD: 3.99 M/UL (ref 4.2–5.4)
SODIUM BLD-SCNC: 137 MEQ/L (ref 132–144)
WBC # BLD: 5.4 K/UL (ref 4.8–10.8)

## 2018-07-15 PROCEDURE — 85025 COMPLETE CBC W/AUTO DIFF WBC: CPT

## 2018-07-15 PROCEDURE — 85610 PROTHROMBIN TIME: CPT

## 2018-07-15 PROCEDURE — 80048 BASIC METABOLIC PNL TOTAL CA: CPT

## 2018-07-15 PROCEDURE — 6360000002 HC RX W HCPCS: Performed by: EMERGENCY MEDICINE

## 2018-07-15 PROCEDURE — 2580000003 HC RX 258: Performed by: EMERGENCY MEDICINE

## 2018-07-15 PROCEDURE — 36415 COLL VENOUS BLD VENIPUNCTURE: CPT

## 2018-07-15 PROCEDURE — 96374 THER/PROPH/DIAG INJ IV PUSH: CPT

## 2018-07-15 PROCEDURE — 99283 EMERGENCY DEPT VISIT LOW MDM: CPT

## 2018-07-15 PROCEDURE — 84702 CHORIONIC GONADOTROPIN TEST: CPT

## 2018-07-15 RX ORDER — ONDANSETRON 4 MG/1
4 TABLET, ORALLY DISINTEGRATING ORAL ONCE
Status: COMPLETED | OUTPATIENT
Start: 2018-07-15 | End: 2018-07-15

## 2018-07-15 RX ORDER — SODIUM CHLORIDE 0.9 % (FLUSH) 0.9 %
3 SYRINGE (ML) INJECTION EVERY 8 HOURS
Status: DISCONTINUED | OUTPATIENT
Start: 2018-07-15 | End: 2018-07-15 | Stop reason: HOSPADM

## 2018-07-15 RX ORDER — HYDROCODONE BITARTRATE AND ACETAMINOPHEN 5; 325 MG/1; MG/1
1 TABLET ORAL EVERY 6 HOURS PRN
Qty: 10 TABLET | Refills: 0 | Status: SHIPPED | OUTPATIENT
Start: 2018-07-15 | End: 2018-07-17

## 2018-07-15 RX ORDER — 0.9 % SODIUM CHLORIDE 0.9 %
500 INTRAVENOUS SOLUTION INTRAVENOUS ONCE
Status: COMPLETED | OUTPATIENT
Start: 2018-07-15 | End: 2018-07-15

## 2018-07-15 RX ADMIN — SODIUM CHLORIDE 500 ML: 9 INJECTION, SOLUTION INTRAVENOUS at 01:01

## 2018-07-15 RX ADMIN — Medication 3 ML: at 01:01

## 2018-07-15 RX ADMIN — ONDANSETRON 4 MG: 4 TABLET, ORALLY DISINTEGRATING ORAL at 01:01

## 2018-07-15 RX ADMIN — HYDROMORPHONE HYDROCHLORIDE 0.5 MG: 1 INJECTION, SOLUTION INTRAMUSCULAR; INTRAVENOUS; SUBCUTANEOUS at 01:01

## 2018-07-15 ASSESSMENT — PAIN DESCRIPTION - PAIN TYPE: TYPE: ACUTE PAIN

## 2018-07-15 ASSESSMENT — PAIN SCALES - GENERAL
PAINLEVEL_OUTOF10: 8
PAINLEVEL_OUTOF10: 8

## 2018-07-15 ASSESSMENT — PAIN DESCRIPTION - ORIENTATION: ORIENTATION: RIGHT;LOWER

## 2018-07-15 ASSESSMENT — PAIN DESCRIPTION - LOCATION: LOCATION: ABDOMEN

## 2018-07-15 NOTE — ED TRIAGE NOTES
Pt 7 weeks pregnant presents with vaginal bleeding x 5 days. Pt seen in this ER 2 days prior and sent home to follow up with OB/GYN. Pt states today bleeding changed color from dark brown to bright red and increased in amount with dime to quarter size clots. Pt c/o right lower abd pain and has taken tylenol at home for pain. Pt denies vomiting.

## 2018-07-15 NOTE — ED PROVIDER NOTES
-Supple (chin-to-chest). CARD: -Rate and rhythm: Regular              -Murmurs: No  RESP: -Respiratory effort and chest excursion with respirations: Normal             -Breath sounds equal bilaterally: Clear             -Wheezes: No             -Rales: No    BACK: -Flank pain: No              -Pain on palpation: No    ABD: -Distended: No           -Bruits: No           -Bowel sounds: Normal.           -Deep palpation: Non-tender           -Organomegaly palpable: No           -Abnormal masses: No    EXT: Gross appearance and use of all four extremities: Normal     SKIN: -Good turgor warm and dry. -Apparent lesions or rashes: No    NEURO: -Patient: alert                -Oriented to: person, place and time. -Appearance and judgment: appropriate.                -Cranial Nerves: Normal.                -Speech: Normal          DIAGNOSTIC RESULTS     EKG: All EKG's are interpreted by the Emergency Department Physician who either signs or Co-signs this chart in the absence of a cardiologist.        RADIOLOGY:   Non-plain film images such as CT, Ultrasound and MRI are read by the radiologist. Plain radiographic images are visualized and preliminarily interpreted by the emergency physician with the below findings:    HCG is diminished I discussed with Rosendo Ivey, Dr. Radha Espana believes that the positive pregnancy will survive are less than 20%. This was relayed to the patient. Follow-up Dr. Rome Good work excuse for bedrest.  Return for increasing volume of bleeding.     Interpretation per the Radiologist below, if available at the time of this note:    No orders to display         ED BEDSIDE ULTRASOUND:   Performed by ED Physician - none    LABS:  Labs Reviewed   BASIC METABOLIC PANEL - Abnormal; Notable for the following:        Result Value    BUN 25 (*)     All other components within normal limits   CBC WITH AUTO DIFFERENTIAL - Abnormal; Notable for the following:     RBC 3.99 (*)     MCH 32.5 (*) All other components within normal limits   HCG, QUANTITATIVE, PREGNANCY   PROTIME-INR       All other labs were within normal range or not returned as of this dictation. EMERGENCY DEPARTMENT COURSE and DIFFERENTIAL DIAGNOSIS/MDM:   Vitals:    Vitals:    07/15/18 0008   BP: (!) 103/56   Pulse: 96   Resp: 18   Temp: 98.4 °F (36.9 °C)   TempSrc: Oral   SpO2: 96%   Weight: 101 lb (45.8 kg)           MDM    CRITICAL CARE TIME   Total Critical Care time was  minutes, excluding separately reportable procedures. There was a high probability of clinically significant/life threatening deterioration in the patient's condition which required my urgent intervention. CONSULTS:  None    PROCEDURES:  Unless otherwise noted below, none     Procedures    FINAL IMPRESSION      1. Threatened miscarriage in early pregnancy          DISPOSITION/PLAN   DISPOSITION Decision To Discharge 07/15/2018 02:10:56 AM      PATIENT REFERRED TO:  ADRIAN Escobar - [facility]  [address] [address], [address]  [place] [address]  [phone]            DISCHARGE MEDICATIONS:  New Prescriptions    HYDROCODONE-ACETAMINOPHEN (NORCO) 5-325 MG PER TABLET    Take 1 tablet by mouth every 6 hours as needed for Pain for up to 7 days. .          (Please note that portions of this note were completed with a voice recognition program.  Efforts were made to edit the dictations but occasionally words are mis-transcribed.)    Grace Taylor MD (electronically signed)  Attending Emergency Physician          Grace Taylor MD  07/15/18 3124

## 2018-07-17 ENCOUNTER — OFFICE VISIT (OUTPATIENT)
Dept: OBGYN CLINIC | Age: 23
End: 2018-07-17

## 2018-07-17 VITALS
SYSTOLIC BLOOD PRESSURE: 90 MMHG | HEIGHT: 62 IN | WEIGHT: 98 LBS | BODY MASS INDEX: 18.03 KG/M2 | DIASTOLIC BLOOD PRESSURE: 60 MMHG

## 2018-07-17 DIAGNOSIS — O03.9 SPONTANEOUS ABORTION: Primary | ICD-10-CM

## 2018-07-17 LAB
C TRACH DNA GENITAL QL NAA+PROBE: NEGATIVE
N. GONORRHOEAE DNA: NEGATIVE

## 2018-07-17 PROCEDURE — 99203 OFFICE O/P NEW LOW 30 MIN: CPT | Performed by: OBSTETRICS & GYNECOLOGY
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
## 2018-10-20 ENCOUNTER — TELEPHONE (OUTPATIENT)
Dept: PRIMARY CARE CLINIC | Age: 23
End: 2018-10-20

## 2018-10-22 ENCOUNTER — HOSPITAL ENCOUNTER (EMERGENCY)
Age: 23
Discharge: HOME | End: 2018-10-22

## 2018-10-22 ENCOUNTER — APPOINTMENT (OUTPATIENT)
Dept: CT IMAGING | Age: 23
End: 2018-10-22

## 2018-10-22 VITALS
BODY MASS INDEX: 17.72 KG/M2 | TEMPERATURE: 98 F | DIASTOLIC BLOOD PRESSURE: 79 MMHG | OXYGEN SATURATION: 100 % | HEART RATE: 57 BPM | RESPIRATION RATE: 17 BRPM | HEIGHT: 63 IN | SYSTOLIC BLOOD PRESSURE: 115 MMHG | WEIGHT: 100 LBS

## 2018-10-22 DIAGNOSIS — R35.0 URINARY FREQUENCY: ICD-10-CM

## 2018-10-22 DIAGNOSIS — N13.30 BILATERAL HYDRONEPHROSIS: Primary | ICD-10-CM

## 2018-10-22 LAB
ALBUMIN SERPL-MCNC: 4.8 G/DL (ref 3.9–4.9)
ALP BLD-CCNC: 52 U/L (ref 40–130)
ALT SERPL-CCNC: 10 U/L (ref 0–33)
ANION GAP SERPL CALCULATED.3IONS-SCNC: 14 MEQ/L (ref 7–13)
AST SERPL-CCNC: 19 U/L (ref 0–35)
BACTERIA: ABNORMAL /HPF
BASOPHILS ABSOLUTE: 0 K/UL (ref 0–0.2)
BASOPHILS RELATIVE PERCENT: 0.4 %
BILIRUB SERPL-MCNC: 0.5 MG/DL (ref 0–1.2)
BILIRUBIN URINE: NEGATIVE
BLOOD, URINE: NEGATIVE
BUN BLDV-MCNC: 24 MG/DL (ref 6–20)
CALCIUM SERPL-MCNC: 9.8 MG/DL (ref 8.6–10.2)
CHLORIDE BLD-SCNC: 100 MEQ/L (ref 98–107)
CHP ED QC CHECK: NORMAL
CLARITY: CLEAR
CO2: 26 MEQ/L (ref 22–29)
COLOR: YELLOW
CREAT SERPL-MCNC: 0.55 MG/DL (ref 0.5–0.9)
EOSINOPHILS ABSOLUTE: 0.1 K/UL (ref 0–0.7)
EOSINOPHILS RELATIVE PERCENT: 2.6 %
EPITHELIAL CELLS, UA: ABNORMAL /HPF
GFR AFRICAN AMERICAN: >60
GFR NON-AFRICAN AMERICAN: >60
GLOBULIN: 2.6 G/DL (ref 2.3–3.5)
GLUCOSE BLD-MCNC: 82 MG/DL (ref 74–109)
GLUCOSE URINE: NEGATIVE MG/DL
HCT VFR BLD CALC: 41.1 % (ref 37–47)
HEMOGLOBIN: 14.2 G/DL (ref 12–16)
KETONES, URINE: NEGATIVE MG/DL
LEUKOCYTE ESTERASE, URINE: ABNORMAL
LYMPHOCYTES ABSOLUTE: 1.5 K/UL (ref 1–4.8)
LYMPHOCYTES RELATIVE PERCENT: 43.2 %
MCH RBC QN AUTO: 32.7 PG (ref 27–31.3)
MCHC RBC AUTO-ENTMCNC: 34.7 % (ref 33–37)
MCV RBC AUTO: 94.4 FL (ref 82–100)
MONOCYTES ABSOLUTE: 0.3 K/UL (ref 0.2–0.8)
MONOCYTES RELATIVE PERCENT: 7.8 %
NEUTROPHILS ABSOLUTE: 1.6 K/UL (ref 1.4–6.5)
NEUTROPHILS RELATIVE PERCENT: 46 %
NITRITE, URINE: NEGATIVE
PDW BLD-RTO: 12.3 % (ref 11.5–14.5)
PH UA: 7 (ref 5–9)
PLATELET # BLD: 195 K/UL (ref 130–400)
POTASSIUM SERPL-SCNC: 3.9 MEQ/L (ref 3.5–5.1)
PREGNANCY TEST URINE, POC: NEGATIVE
PROTEIN UA: NEGATIVE MG/DL
RBC # BLD: 4.35 M/UL (ref 4.2–5.4)
RBC UA: ABNORMAL /HPF (ref 0–2)
SLIDE REVIEW: ABNORMAL
SODIUM BLD-SCNC: 140 MEQ/L (ref 132–144)
SPECIFIC GRAVITY UA: 1.03 (ref 1–1.03)
TOTAL PROTEIN: 7.4 G/DL (ref 6.4–8.1)
URINE REFLEX TO CULTURE: YES
UROBILINOGEN, URINE: 0.2 E.U./DL
WBC # BLD: 3.6 K/UL (ref 4.8–10.8)
WBC UA: ABNORMAL /HPF (ref 0–5)

## 2018-10-22 PROCEDURE — 74176 CT ABD & PELVIS W/O CONTRAST: CPT

## 2018-10-22 PROCEDURE — 81001 URINALYSIS AUTO W/SCOPE: CPT

## 2018-10-22 PROCEDURE — 2580000003 HC RX 258: Performed by: PHYSICIAN ASSISTANT

## 2018-10-22 PROCEDURE — 36415 COLL VENOUS BLD VENIPUNCTURE: CPT

## 2018-10-22 PROCEDURE — 85025 COMPLETE CBC W/AUTO DIFF WBC: CPT

## 2018-10-22 PROCEDURE — 6360000002 HC RX W HCPCS: Performed by: PHYSICIAN ASSISTANT

## 2018-10-22 PROCEDURE — 87086 URINE CULTURE/COLONY COUNT: CPT

## 2018-10-22 PROCEDURE — 99283 EMERGENCY DEPT VISIT LOW MDM: CPT

## 2018-10-22 PROCEDURE — 80053 COMPREHEN METABOLIC PANEL: CPT

## 2018-10-22 PROCEDURE — 96374 THER/PROPH/DIAG INJ IV PUSH: CPT

## 2018-10-22 RX ORDER — TRAMADOL HYDROCHLORIDE 50 MG/1
50 TABLET ORAL EVERY 6 HOURS PRN
Qty: 12 TABLET | Refills: 0 | Status: SHIPPED | OUTPATIENT
Start: 2018-10-22 | End: 2018-10-25

## 2018-10-22 RX ORDER — SULFAMETHOXAZOLE AND TRIMETHOPRIM 800; 160 MG/1; MG/1
1 TABLET ORAL 2 TIMES DAILY
Qty: 20 TABLET | Refills: 0 | Status: SHIPPED | OUTPATIENT
Start: 2018-10-22 | End: 2018-11-01

## 2018-10-22 RX ORDER — 0.9 % SODIUM CHLORIDE 0.9 %
1000 INTRAVENOUS SOLUTION INTRAVENOUS ONCE
Status: COMPLETED | OUTPATIENT
Start: 2018-10-22 | End: 2018-10-22

## 2018-10-22 RX ORDER — KETOROLAC TROMETHAMINE 30 MG/ML
30 INJECTION, SOLUTION INTRAMUSCULAR; INTRAVENOUS ONCE
Status: COMPLETED | OUTPATIENT
Start: 2018-10-22 | End: 2018-10-22

## 2018-10-22 RX ADMIN — SODIUM CHLORIDE 1000 ML: 9 INJECTION, SOLUTION INTRAVENOUS at 13:00

## 2018-10-22 RX ADMIN — KETOROLAC TROMETHAMINE 30 MG: 30 INJECTION, SOLUTION INTRAMUSCULAR at 16:20

## 2018-10-22 ASSESSMENT — ENCOUNTER SYMPTOMS
NAUSEA: 0
ABDOMINAL PAIN: 0
BACK PAIN: 0
RHINORRHEA: 0
SORE THROAT: 0
VOMITING: 0
PHOTOPHOBIA: 0
EYE PAIN: 0
DIARRHEA: 0
SHORTNESS OF BREATH: 0
COUGH: 0

## 2018-10-22 ASSESSMENT — PAIN DESCRIPTION - PROGRESSION: CLINICAL_PROGRESSION: GRADUALLY IMPROVING

## 2018-10-22 ASSESSMENT — PAIN DESCRIPTION - ORIENTATION: ORIENTATION: LEFT

## 2018-10-22 ASSESSMENT — PAIN DESCRIPTION - FREQUENCY: FREQUENCY: INTERMITTENT

## 2018-10-22 ASSESSMENT — PAIN SCALES - GENERAL
PAINLEVEL_OUTOF10: 7
PAINLEVEL_OUTOF10: 4

## 2018-10-22 ASSESSMENT — PAIN DESCRIPTION - LOCATION: LOCATION: FLANK

## 2018-10-22 ASSESSMENT — PAIN DESCRIPTION - DESCRIPTORS: DESCRIPTORS: DISCOMFORT;PRESSURE;SHARP

## 2018-10-22 ASSESSMENT — PAIN DESCRIPTION - ONSET: ONSET: ON-GOING

## 2018-10-23 LAB — URINE CULTURE, ROUTINE: NORMAL

## 2019-02-12 ENCOUNTER — OFFICE VISIT (OUTPATIENT)
Dept: FAMILY MEDICINE CLINIC | Age: 24
End: 2019-02-12

## 2019-02-12 VITALS
HEART RATE: 88 BPM | WEIGHT: 100 LBS | OXYGEN SATURATION: 98 % | TEMPERATURE: 98.4 F | BODY MASS INDEX: 17.72 KG/M2 | SYSTOLIC BLOOD PRESSURE: 102 MMHG | HEIGHT: 63 IN | DIASTOLIC BLOOD PRESSURE: 68 MMHG

## 2019-02-12 DIAGNOSIS — N92.6 IRREGULAR MENSES: Primary | ICD-10-CM

## 2019-02-12 DIAGNOSIS — N92.6 IRREGULAR MENSES: ICD-10-CM

## 2019-02-12 DIAGNOSIS — F32.A DEPRESSION, UNSPECIFIED DEPRESSION TYPE: ICD-10-CM

## 2019-02-12 DIAGNOSIS — Z13.31 POSITIVE DEPRESSION SCREENING: ICD-10-CM

## 2019-02-12 LAB
ALBUMIN SERPL-MCNC: 4.5 G/DL (ref 3.5–4.6)
ALP BLD-CCNC: 48 U/L (ref 40–130)
ALT SERPL-CCNC: 10 U/L (ref 0–33)
ANION GAP SERPL CALCULATED.3IONS-SCNC: 16 MEQ/L (ref 9–15)
AST SERPL-CCNC: 16 U/L (ref 0–35)
BASOPHILS ABSOLUTE: 0 K/UL (ref 0–0.2)
BASOPHILS RELATIVE PERCENT: 0.7 %
BILIRUB SERPL-MCNC: 0.5 MG/DL (ref 0.2–0.7)
BUN BLDV-MCNC: 25 MG/DL (ref 6–20)
CALCIUM SERPL-MCNC: 9.7 MG/DL (ref 8.5–9.9)
CHLORIDE BLD-SCNC: 103 MEQ/L (ref 95–107)
CO2: 23 MEQ/L (ref 20–31)
CREAT SERPL-MCNC: 0.68 MG/DL (ref 0.5–0.9)
EOSINOPHILS ABSOLUTE: 0 K/UL (ref 0–0.7)
EOSINOPHILS RELATIVE PERCENT: 1.3 %
GFR AFRICAN AMERICAN: >60
GFR NON-AFRICAN AMERICAN: >60
GLOBULIN: 2.4 G/DL (ref 2.3–3.5)
GLUCOSE BLD-MCNC: 76 MG/DL (ref 70–99)
GONADOTROPIN, CHORIONIC (HCG) QUANT: <0.1 MIU/ML
HCT VFR BLD CALC: 40.2 % (ref 37–47)
HEMOGLOBIN: 13.8 G/DL (ref 12–16)
LYMPHOCYTES ABSOLUTE: 1.5 K/UL (ref 1–4.8)
LYMPHOCYTES RELATIVE PERCENT: 40.6 %
MCH RBC QN AUTO: 33.3 PG (ref 27–31.3)
MCHC RBC AUTO-ENTMCNC: 34.2 % (ref 33–37)
MCV RBC AUTO: 97.2 FL (ref 82–100)
MONOCYTES ABSOLUTE: 0.2 K/UL (ref 0.2–0.8)
MONOCYTES RELATIVE PERCENT: 6.2 %
NEUTROPHILS ABSOLUTE: 1.9 K/UL (ref 1.4–6.5)
NEUTROPHILS RELATIVE PERCENT: 51.2 %
PDW BLD-RTO: 12.4 % (ref 11.5–14.5)
PLATELET # BLD: 205 K/UL (ref 130–400)
POTASSIUM SERPL-SCNC: 3.8 MEQ/L (ref 3.4–4.9)
RBC # BLD: 4.14 M/UL (ref 4.2–5.4)
SODIUM BLD-SCNC: 142 MEQ/L (ref 135–144)
TOTAL PROTEIN: 6.9 G/DL (ref 6.3–8)
TSH SERPL DL<=0.05 MIU/L-ACNC: 0.74 UIU/ML (ref 0.44–3.86)
WBC # BLD: 3.7 K/UL (ref 4.8–10.8)

## 2019-02-12 PROCEDURE — G0444 DEPRESSION SCREEN ANNUAL: HCPCS | Performed by: NURSE PRACTITIONER

## 2019-02-12 PROCEDURE — G8431 POS CLIN DEPRES SCRN F/U DOC: HCPCS | Performed by: NURSE PRACTITIONER

## 2019-02-12 PROCEDURE — 99213 OFFICE O/P EST LOW 20 MIN: CPT | Performed by: NURSE PRACTITIONER

## 2019-02-12 RX ORDER — ESCITALOPRAM OXALATE 10 MG/1
10 TABLET ORAL DAILY
Qty: 30 TABLET | Refills: 3 | Status: SHIPPED | OUTPATIENT
Start: 2019-02-12 | End: 2019-03-27 | Stop reason: ALTCHOICE

## 2019-02-12 ASSESSMENT — ENCOUNTER SYMPTOMS
COUGH: 0
SHORTNESS OF BREATH: 0

## 2019-02-12 ASSESSMENT — PATIENT HEALTH QUESTIONNAIRE - PHQ9
SUM OF ALL RESPONSES TO PHQ QUESTIONS 1-9: 15
10. IF YOU CHECKED OFF ANY PROBLEMS, HOW DIFFICULT HAVE THESE PROBLEMS MADE IT FOR YOU TO DO YOUR WORK, TAKE CARE OF THINGS AT HOME, OR GET ALONG WITH OTHER PEOPLE: 2
1. LITTLE INTEREST OR PLEASURE IN DOING THINGS: 2
8. MOVING OR SPEAKING SO SLOWLY THAT OTHER PEOPLE COULD HAVE NOTICED. OR THE OPPOSITE, BEING SO FIGETY OR RESTLESS THAT YOU HAVE BEEN MOVING AROUND A LOT MORE THAN USUAL: 0
SUM OF ALL RESPONSES TO PHQ QUESTIONS 1-9: 15
4. FEELING TIRED OR HAVING LITTLE ENERGY: 3
9. THOUGHTS THAT YOU WOULD BE BETTER OFF DEAD, OR OF HURTING YOURSELF: 1
7. TROUBLE CONCENTRATING ON THINGS, SUCH AS READING THE NEWSPAPER OR WATCHING TELEVISION: 0
5. POOR APPETITE OR OVEREATING: 2
6. FEELING BAD ABOUT YOURSELF - OR THAT YOU ARE A FAILURE OR HAVE LET YOURSELF OR YOUR FAMILY DOWN: 3
SUM OF ALL RESPONSES TO PHQ9 QUESTIONS 1 & 2: 3
2. FEELING DOWN, DEPRESSED OR HOPELESS: 1
3. TROUBLE FALLING OR STAYING ASLEEP: 3

## 2019-03-21 ENCOUNTER — OFFICE VISIT (OUTPATIENT)
Dept: FAMILY MEDICINE CLINIC | Age: 24
End: 2019-03-21
Payer: COMMERCIAL

## 2019-03-21 VITALS
DIASTOLIC BLOOD PRESSURE: 70 MMHG | TEMPERATURE: 98.2 F | OXYGEN SATURATION: 98 % | SYSTOLIC BLOOD PRESSURE: 110 MMHG | HEIGHT: 63 IN | HEART RATE: 103 BPM | BODY MASS INDEX: 18.11 KG/M2 | WEIGHT: 102.2 LBS

## 2019-03-21 DIAGNOSIS — N91.2 AMENORRHEA: Primary | ICD-10-CM

## 2019-03-21 DIAGNOSIS — Z3A.01 LESS THAN 8 WEEKS GESTATION OF PREGNANCY: ICD-10-CM

## 2019-03-21 DIAGNOSIS — J02.9 ACUTE PHARYNGITIS, UNSPECIFIED ETIOLOGY: ICD-10-CM

## 2019-03-21 DIAGNOSIS — N91.2 AMENORRHEA: ICD-10-CM

## 2019-03-21 LAB — GONADOTROPIN, CHORIONIC (HCG) QUANT: 9460 MIU/ML

## 2019-03-21 PROCEDURE — 99213 OFFICE O/P EST LOW 20 MIN: CPT | Performed by: NURSE PRACTITIONER

## 2019-03-21 ASSESSMENT — ENCOUNTER SYMPTOMS
SORE THROAT: 1
DIARRHEA: 0
CONSTIPATION: 0
COUGH: 1
SHORTNESS OF BREATH: 0

## 2019-03-27 ENCOUNTER — OFFICE VISIT (OUTPATIENT)
Dept: FAMILY MEDICINE CLINIC | Age: 24
End: 2019-03-27
Payer: COMMERCIAL

## 2019-03-27 VITALS
SYSTOLIC BLOOD PRESSURE: 102 MMHG | TEMPERATURE: 98.2 F | DIASTOLIC BLOOD PRESSURE: 72 MMHG | WEIGHT: 101.2 LBS | HEART RATE: 88 BPM | RESPIRATION RATE: 20 BRPM | BODY MASS INDEX: 17.93 KG/M2 | HEIGHT: 63 IN | OXYGEN SATURATION: 98 %

## 2019-03-27 DIAGNOSIS — R09.82 POST-NASAL DRIP: ICD-10-CM

## 2019-03-27 DIAGNOSIS — H65.93 FLUID LEVEL BEHIND TYMPANIC MEMBRANE OF BOTH EARS: Primary | ICD-10-CM

## 2019-03-27 PROCEDURE — 99213 OFFICE O/P EST LOW 20 MIN: CPT | Performed by: NURSE PRACTITIONER

## 2019-03-27 RX ORDER — LORATADINE 10 MG/1
10 TABLET ORAL DAILY
Qty: 30 TABLET | Refills: 0 | Status: SHIPPED | OUTPATIENT
Start: 2019-03-27 | End: 2019-04-26

## 2019-03-29 ENCOUNTER — TELEPHONE (OUTPATIENT)
Dept: OBGYN CLINIC | Age: 24
End: 2019-03-29

## 2019-03-29 DIAGNOSIS — Z32.01 PREGNANCY TEST POSITIVE: Primary | ICD-10-CM

## 2019-03-29 NOTE — TELEPHONE ENCOUNTER
Pt's  is calling regarding scheduled  appointment on 4-29-19. Pt's  states that the pt is having bad anxiety regarding pregnancy since she has had miscarriages in the past and has had cancer. Please advise if ok to order serial HCG.  is aware that Dr Ade Pope is out if the office until Monday.

## 2019-04-01 NOTE — TELEPHONE ENCOUNTER
Per last office visit note, pt to come in for serial hcg levels, and schedule patient closer to 8 weeks gestation. Order placed for hcg.

## 2019-04-03 DIAGNOSIS — Z32.01 PREGNANCY TEST POSITIVE: ICD-10-CM

## 2019-04-03 LAB — GONADOTROPIN, CHORIONIC (HCG) QUANT: NORMAL MIU/ML

## 2019-04-04 ENCOUNTER — TELEPHONE (OUTPATIENT)
Dept: OBGYN CLINIC | Age: 24
End: 2019-04-04

## 2019-04-05 PROBLEM — R09.82 POST-NASAL DRIP: Status: ACTIVE | Noted: 2019-04-05

## 2019-04-05 PROBLEM — H65.93 FLUID LEVEL BEHIND TYMPANIC MEMBRANE OF BOTH EARS: Status: ACTIVE | Noted: 2019-04-05

## 2019-04-05 ASSESSMENT — ENCOUNTER SYMPTOMS
SORE THROAT: 1
VOMITING: 0
ABDOMINAL PAIN: 0
RHINORRHEA: 1
COUGH: 0
DIARRHEA: 0

## 2019-04-06 NOTE — PROGRESS NOTES
person, place, and time. Skin: Skin is warm and dry. Psychiatric: She has a normal mood and affect. Assessment & Plan    Diagnosis Orders   1. Fluid level behind tympanic membrane of both ears  loratadine (CLARITIN) 10 MG tablet   2. Post-nasal drip  loratadine (CLARITIN) 10 MG tablet       Return if symptoms worsen or fail to improve. Reviewed with the patient: current clinical status, medications, activities and diet. Side effects, adverse effects of the medication prescribed today, as well as treatment plan/ rationale and result expectations have beendiscussed with the patient who expresses understanding and desires to proceed. Close follow up to evaluate treatment results and for coordinationof care. I have reviewed the patient's medical history in detail and updated the computerized patient record.     ADRIAN Waters - CNP

## 2019-04-12 ENCOUNTER — OFFICE VISIT (OUTPATIENT)
Dept: FAMILY MEDICINE CLINIC | Age: 24
End: 2019-04-12
Payer: COMMERCIAL

## 2019-04-12 VITALS
TEMPERATURE: 98.1 F | DIASTOLIC BLOOD PRESSURE: 62 MMHG | HEIGHT: 63 IN | HEART RATE: 102 BPM | WEIGHT: 104 LBS | BODY MASS INDEX: 18.43 KG/M2 | RESPIRATION RATE: 14 BRPM | SYSTOLIC BLOOD PRESSURE: 118 MMHG | OXYGEN SATURATION: 99 %

## 2019-04-12 DIAGNOSIS — R35.0 FREQUENCY OF URINATION: ICD-10-CM

## 2019-04-12 DIAGNOSIS — M54.9 CVA TENDERNESS: Primary | ICD-10-CM

## 2019-04-12 LAB
BILIRUBIN, POC: ABNORMAL
BLOOD URINE, POC: ABNORMAL
CLARITY, POC: CLEAR
COLOR, POC: YELLOW
GLUCOSE URINE, POC: ABNORMAL
KETONES, POC: ABNORMAL
LEUKOCYTE EST, POC: ABNORMAL
NITRITE, POC: ABNORMAL
PH, POC: 7
PROTEIN, POC: ABNORMAL
SPECIFIC GRAVITY, POC: 1.01
UROBILINOGEN, POC: 3.5

## 2019-04-12 PROCEDURE — 81003 URINALYSIS AUTO W/O SCOPE: CPT | Performed by: NURSE PRACTITIONER

## 2019-04-12 PROCEDURE — 99213 OFFICE O/P EST LOW 20 MIN: CPT | Performed by: NURSE PRACTITIONER

## 2019-04-12 RX ORDER — CEPHALEXIN 500 MG/1
500 CAPSULE ORAL 2 TIMES DAILY
Qty: 10 CAPSULE | Refills: 0 | Status: SHIPPED | OUTPATIENT
Start: 2019-04-12 | End: 2019-04-17

## 2019-04-12 ASSESSMENT — ENCOUNTER SYMPTOMS
SHORTNESS OF BREATH: 0
COUGH: 0
ABDOMINAL PAIN: 0
RHINORRHEA: 0
NAUSEA: 0
WHEEZING: 0
SORE THROAT: 0
DIARRHEA: 0
BACK PAIN: 1
VOMITING: 0

## 2019-04-12 NOTE — PROGRESS NOTES
Height: 5' 2.5\" (1.588 m)     BP Readings from Last 3 Encounters:   04/12/19 118/62   03/27/19 102/72   03/21/19 110/70     Wt Readings from Last 3 Encounters:   04/12/19 104 lb (47.2 kg)   03/27/19 101 lb 3.2 oz (45.9 kg)   03/21/19 102 lb 3.2 oz (46.4 kg)     Physical Exam   Constitutional: She is oriented to person, place, and time. She appears well-developed and well-nourished. HENT:   Right Ear: Tympanic membrane normal.   Left Ear: Tympanic membrane normal.   Nose: Nose normal. Right sinus exhibits no maxillary sinus tenderness and no frontal sinus tenderness. Left sinus exhibits no maxillary sinus tenderness and no frontal sinus tenderness. Mouth/Throat: Uvula is midline, oropharynx is clear and moist and mucous membranes are normal.   Eyes: Pupils are equal, round, and reactive to light. Conjunctivae and EOM are normal.   Neck: Full passive range of motion without pain. No muscular tenderness present. Cardiovascular: Normal rate, regular rhythm, S1 normal, S2 normal and normal heart sounds. Pulmonary/Chest: Effort normal. No respiratory distress. She has no wheezes. She has no rhonchi. She has no rales. Abdominal: Soft. Normal appearance and bowel sounds are normal. There is no tenderness. There is CVA tenderness (left). Musculoskeletal: Normal range of motion. Lymphadenopathy:        Head (right side): No submandibular and no tonsillar adenopathy present. Head (left side): No submandibular and no tonsillar adenopathy present. She has no cervical adenopathy. Neurological: She is alert and oriented to person, place, and time. Skin: Skin is warm, dry and intact. No rash noted. Psychiatric: She has a normal mood and affect. Her speech is normal and behavior is normal.     Assessment & Plan    Diagnosis Orders   1. CVA tenderness  cephALEXin (KEFLEX) 500 MG capsule   2.  Frequency of urination  POCT Urinalysis No Micro (Auto)    Urine Culture    cephALEXin (KEFLEX) 500 MG capsule Orders Placed This Encounter   Procedures    Urine Culture     Standing Status:   Future     Standing Expiration Date:   4/12/2020     Order Specific Question:   Specify (ex-cath, midstream, cysto, etc)? Answer:   clean catch    POCT Urinalysis No Micro (Auto)     Orders Placed This Encounter   Medications    cephALEXin (KEFLEX) 500 MG capsule     Sig: Take 1 capsule by mouth 2 times daily for 5 days     Dispense:  10 capsule     Refill:  0     There are no discontinued medications. No follow-ups on file. Reviewed with the patient: current clinical status,medications, activities and diet. Pt encouraged to drink plenty of water  Tylenol as needed. Side effects, adverse effects of themedication prescribed today, as well as treatment plan/ rationale and result expectations have been discussed with the patient who expresses understanding and desires to proceed. Close follow up to evaluate treatment results and for coordination of care. I have reviewed the patient's medical history in detail and updated the computerized patient record.     ADRIAN Butterfield

## 2019-04-14 LAB — URINE CULTURE, ROUTINE: NORMAL

## 2019-04-29 ENCOUNTER — OFFICE VISIT (OUTPATIENT)
Dept: OBGYN CLINIC | Age: 24
End: 2019-04-29
Payer: COMMERCIAL

## 2019-04-29 VITALS
BODY MASS INDEX: 18.61 KG/M2 | WEIGHT: 105 LBS | SYSTOLIC BLOOD PRESSURE: 96 MMHG | DIASTOLIC BLOOD PRESSURE: 60 MMHG | HEIGHT: 63 IN

## 2019-04-29 DIAGNOSIS — Z32.01 PREGNANCY EXAMINATION OR TEST, POSITIVE RESULT: Primary | ICD-10-CM

## 2019-04-29 DIAGNOSIS — Z32.01 PREGNANCY EXAMINATION OR TEST, POSITIVE RESULT: ICD-10-CM

## 2019-04-29 DIAGNOSIS — Z11.3 ROUTINE SCREENING FOR STI (SEXUALLY TRANSMITTED INFECTION): ICD-10-CM

## 2019-04-29 LAB
BILIRUBIN URINE: NEGATIVE
BLOOD, URINE: NEGATIVE
CLARITY: ABNORMAL
COLOR: YELLOW
GLUCOSE URINE: NEGATIVE MG/DL
HCG, URINE, POC: POSITIVE
KETONES, URINE: NEGATIVE MG/DL
LEUKOCYTE ESTERASE, URINE: NEGATIVE
Lab: ABNORMAL
NEGATIVE QC PASS/FAIL: ABNORMAL
NITRITE, URINE: NEGATIVE
PH UA: 7.5 (ref 5–9)
POSITIVE QC PASS/FAIL: ABNORMAL
PROTEIN UA: NEGATIVE MG/DL
SPECIFIC GRAVITY UA: 1.02 (ref 1–1.03)
UROBILINOGEN, URINE: 0.2 E.U./DL

## 2019-04-29 PROCEDURE — 99213 OFFICE O/P EST LOW 20 MIN: CPT | Performed by: OBSTETRICS & GYNECOLOGY

## 2019-04-29 PROCEDURE — 81025 URINE PREGNANCY TEST: CPT | Performed by: OBSTETRICS & GYNECOLOGY

## 2019-04-30 ENCOUNTER — HOSPITAL ENCOUNTER (OUTPATIENT)
Dept: ULTRASOUND IMAGING | Age: 24
Discharge: HOME | End: 2019-05-02
Payer: COMMERCIAL

## 2019-04-30 DIAGNOSIS — Z32.01 PREGNANCY EXAMINATION OR TEST, POSITIVE RESULT: ICD-10-CM

## 2019-04-30 PROCEDURE — 76801 OB US < 14 WKS SINGLE FETUS: CPT

## 2019-05-01 LAB — URINE CULTURE, ROUTINE: NORMAL

## 2019-05-02 LAB
6-ACETYLMORPHINE: NOT DETECTED
7-AMINOCLONAZEPAM: NOT DETECTED
ALPHA-OH-ALPRAZOLAM: NOT DETECTED
ALPRAZOLAM: NOT DETECTED
AMPHETAMINE: NOT DETECTED
BARBITURATES: NOT DETECTED
BENZOYLECGONINE: NOT DETECTED
BUPRENORPHINE: NOT DETECTED
CARISOPRODOL: NOT DETECTED
CLONAZEPAM: NOT DETECTED
CODEINE: NOT DETECTED
CREATININE URINE: 97.7 MG/DL (ref 20–400)
DIAZEPAM: NOT DETECTED
EER PAIN MGT DRUG PANEL, HIGH RES/EMIT U: NORMAL
ETHYL GLUCURONIDE: NOT DETECTED
FENTANYL: NOT DETECTED
HYDROCODONE: NOT DETECTED
HYDROMORPHONE: NOT DETECTED
LORAZEPAM: NOT DETECTED
MARIJUANA METABOLITE: NOT DETECTED
MDA: NOT DETECTED
MDEA: NOT DETECTED
MDMA URINE: NOT DETECTED
MEPERIDINE: NOT DETECTED
METHADONE: NOT DETECTED
METHAMPHETAMINE: NOT DETECTED
METHYLPHENIDATE: NOT DETECTED
MIDAZOLAM: NOT DETECTED
MORPHINE: NOT DETECTED
NORBUPRENORPHINE, FREE: NOT DETECTED
NORDIAZEPAM: NOT DETECTED
NORFENTANYL: NOT DETECTED
NORHYDROCODONE, URINE: NOT DETECTED
NOROXYCODONE: NOT DETECTED
NOROXYMORPHONE, URINE: NOT DETECTED
OXAZEPAM: NOT DETECTED
OXYCODONE: NOT DETECTED
OXYMORPHONE: NOT DETECTED
PAIN MANAGEMENT DRUG PANEL: NORMAL
PCP: NOT DETECTED
PHENTERMINE: NOT DETECTED
PROPOXYPHENE: NOT DETECTED
SPECIMEN SOURCE: NORMAL
T. VAGINALIS AMPLIFIED: NEGATIVE
TAPENTADOL, URINE: NOT DETECTED
TAPENTADOL-O-SULFATE, URINE: NOT DETECTED
TEMAZEPAM: NOT DETECTED
TRAMADOL: NOT DETECTED
ZOLPIDEM: NOT DETECTED

## 2019-05-06 LAB
C. TRACHOMATIS DNA ,URINE: NEGATIVE
N. GONORRHOEAE DNA, URINE: NEGATIVE

## 2019-05-29 ENCOUNTER — INITIAL PRENATAL (OUTPATIENT)
Dept: OBGYN CLINIC | Age: 24
End: 2019-05-29

## 2019-05-29 VITALS
HEART RATE: 80 BPM | SYSTOLIC BLOOD PRESSURE: 94 MMHG | BODY MASS INDEX: 19.26 KG/M2 | WEIGHT: 107 LBS | DIASTOLIC BLOOD PRESSURE: 70 MMHG

## 2019-05-29 DIAGNOSIS — Z11.3 SCREENING FOR STD (SEXUALLY TRANSMITTED DISEASE): ICD-10-CM

## 2019-05-29 DIAGNOSIS — Z3A.15 15 WEEKS GESTATION OF PREGNANCY: ICD-10-CM

## 2019-05-29 DIAGNOSIS — Z11.51 SCREENING FOR HPV (HUMAN PAPILLOMAVIRUS): ICD-10-CM

## 2019-05-29 DIAGNOSIS — Z34.80 SUPERVISION OF OTHER NORMAL PREGNANCY, ANTEPARTUM: ICD-10-CM

## 2019-05-29 DIAGNOSIS — Z34.80 SUPERVISION OF OTHER NORMAL PREGNANCY, ANTEPARTUM: Primary | ICD-10-CM

## 2019-05-29 LAB
BASOPHILS ABSOLUTE: 0 K/UL (ref 0–0.2)
BASOPHILS RELATIVE PERCENT: 0.3 %
EOSINOPHILS ABSOLUTE: 0.2 K/UL (ref 0–0.7)
EOSINOPHILS RELATIVE PERCENT: 3.8 %
HCT VFR BLD CALC: 36.3 % (ref 37–47)
HEMOGLOBIN: 12.9 G/DL (ref 12–16)
HEPATITIS B SURFACE ANTIGEN INTERPRETATION: NORMAL
LYMPHOCYTES ABSOLUTE: 1.4 K/UL (ref 1–4.8)
LYMPHOCYTES RELATIVE PERCENT: 26.6 %
MCH RBC QN AUTO: 34.9 PG (ref 27–31.3)
MCHC RBC AUTO-ENTMCNC: 35.6 % (ref 33–37)
MCV RBC AUTO: 97.9 FL (ref 82–100)
MONOCYTES ABSOLUTE: 0.3 K/UL (ref 0.2–0.8)
MONOCYTES RELATIVE PERCENT: 5.3 %
NEUTROPHILS ABSOLUTE: 3.3 K/UL (ref 1.4–6.5)
NEUTROPHILS RELATIVE PERCENT: 64 %
PDW BLD-RTO: 13 % (ref 11.5–14.5)
PLATELET # BLD: 219 K/UL (ref 130–400)
RBC # BLD: 3.71 M/UL (ref 4.2–5.4)
RUBELLA ANTIBODY IGG: 16.1 IU/ML
WBC # BLD: 5.2 K/UL (ref 4.8–10.8)

## 2019-05-29 PROCEDURE — 0500F INITIAL PRENATAL CARE VISIT: CPT | Performed by: OBSTETRICS & GYNECOLOGY

## 2019-05-29 RX ORDER — LORATADINE 10 MG/1
10 TABLET ORAL DAILY
COMMUNITY
End: 2021-02-24 | Stop reason: ALTCHOICE

## 2019-05-29 NOTE — PROGRESS NOTES
SUBJECTIVE:   26 y.o.  female here for amenorrhea and new ob. Pt denies any VB and is tolerating po daily. Pt taking PNV. PT with h/o SAB x 1 and early US to confirm PRINCE 19. Review of Systems:  General ROS: negative  Psychological ROS: negative  ENT ROS: negative  Endocrine ROS: negative  Respiratory ROS: no cough, shortness of breath, or wheezing  Cardiovascular ROS: no chest pain or dyspnea on exertion  Gastrointestinal ROS: no abdominal pain, change in bowel habits, or black or bloody stools  Genito-Urinary ROS: no dysuria, trouble voiding, or hematuria  Musculoskeletal ROS: negative  Neurological ROS: no TIA or stroke symptoms  Dermatological ROS: negative    OBJECTIVE:   BP 94/70   Pulse 80   Wt 107 lb (48.5 kg)   LMP 02/10/2019   BMI 19.26 kg/m²     Physical Exam:  GEN: She appears well, afebrile. HEENT: normal cephalic, atraumatic  CVS: regular rate and rhythm  ABDOMEN: benign, soft, nontender, no masses. MUSCULOSKELETAL: normal gait, no masses  SKIN: normal texture and tone, no lesions  NEURO: normal tone, no hyperreflexia, 1+DTRs throughout    Pelvic Exam:   EFG: normal external genitalia  URETHRA: normal appearing without diverticula or lesions  VULVA: normal appearing vulva with no masses, tenderness or lesions  VAGINA: normal rugae, no discharge   CERVIX: parous, no lesions  UTERUS: uterus is normal shape, consistency and nontender -14 wks  ADNEXA: normal adnexa in size, nontender and no masses. PERINEUM: normal appearing without lesions or masses  ANUS: normal appearing without lesions or masses, no fissures or hemorrhoids    ASSESSMENT:   Supervision of pregnancy    PLAN:   Pap with GC/Chlam today  PN labs and US pending   Past medical, social and family history reviewed and updated in pt's chart.

## 2019-05-30 LAB
ABO/RH: NORMAL
ANTIBODY SCREEN: NORMAL
RPR: NORMAL

## 2019-05-31 LAB — HIV 1,2 COMBO ANTIGEN/ANTIBODY: NEGATIVE

## 2019-06-01 LAB — VZV IGG SER QL IA: 84 IV

## 2019-06-02 LAB
CYSTIC FIBROSIS 165 VARIANTS INTERP: NORMAL
CYSTIC FIBROSIS 5T VARIANT: NORMAL
CYSTIC FIBROSIS ALLELE 1: NEGATIVE
CYSTIC FIBROSIS ALLELE 2: NEGATIVE

## 2019-06-03 LAB
EER NON INVASIVE PRENATAL ANEUPLOIDY: NORMAL
FETAL FRACTION: 11.2
FETAL GENDER: NORMAL
GESTATIONAL AGE (DAYS): 3
GESTATIONAL AGE(WEEKS): 15
Lab: NORMAL
MATERNAL WEIGHT: 107
MONOSOMY X: NORMAL
REPORT FETUS GENDER: YES
TRIPLOIDY (VANISHING TWIN): NORMAL
TRISOMY 13 RISK: NORMAL
TRISOMY 18 RISK ASSESSMENT: NORMAL
TRISOMY 21 RISK: NORMAL

## 2019-06-07 DIAGNOSIS — Z11.3 SCREENING FOR STD (SEXUALLY TRANSMITTED DISEASE): ICD-10-CM

## 2019-06-07 DIAGNOSIS — Z3A.15 15 WEEKS GESTATION OF PREGNANCY: ICD-10-CM

## 2019-06-07 DIAGNOSIS — Z11.51 SCREENING FOR HPV (HUMAN PAPILLOMAVIRUS): ICD-10-CM

## 2019-06-07 DIAGNOSIS — Z34.80 SUPERVISION OF OTHER NORMAL PREGNANCY, ANTEPARTUM: ICD-10-CM

## 2019-06-10 ENCOUNTER — HOSPITAL ENCOUNTER (EMERGENCY)
Age: 24
Discharge: HOME | End: 2019-06-10
Attending: EMERGENCY MEDICINE
Payer: MEDICAID

## 2019-06-10 VITALS
RESPIRATION RATE: 18 BRPM | TEMPERATURE: 98.5 F | DIASTOLIC BLOOD PRESSURE: 72 MMHG | HEIGHT: 62 IN | BODY MASS INDEX: 19.88 KG/M2 | WEIGHT: 108 LBS | OXYGEN SATURATION: 98 % | SYSTOLIC BLOOD PRESSURE: 120 MMHG | HEART RATE: 90 BPM

## 2019-06-10 DIAGNOSIS — R07.89 CHEST WALL PAIN: Primary | ICD-10-CM

## 2019-06-10 PROCEDURE — 6370000000 HC RX 637 (ALT 250 FOR IP): Performed by: EMERGENCY MEDICINE

## 2019-06-10 PROCEDURE — 99284 EMERGENCY DEPT VISIT MOD MDM: CPT

## 2019-06-10 RX ORDER — ACETAMINOPHEN 500 MG
1000 TABLET ORAL ONCE
Status: COMPLETED | OUTPATIENT
Start: 2019-06-10 | End: 2019-06-10

## 2019-06-10 RX ADMIN — ACETAMINOPHEN 1000 MG: 500 TABLET ORAL at 17:58

## 2019-06-10 ASSESSMENT — ENCOUNTER SYMPTOMS
ABDOMINAL PAIN: 0
SORE THROAT: 0
EYE PAIN: 0
SHORTNESS OF BREATH: 0
VOMITING: 0
NAUSEA: 0
CHEST TIGHTNESS: 0

## 2019-06-10 ASSESSMENT — PAIN DESCRIPTION - LOCATION: LOCATION: CHEST

## 2019-06-10 ASSESSMENT — PAIN SCALES - GENERAL
PAINLEVEL_OUTOF10: 3
PAINLEVEL_OUTOF10: 3

## 2019-06-10 NOTE — ED PROVIDER NOTES
[address] [facility] ED  eMERGENCY dEPARTMENTeNCOUnter      Pt Name: Dina Apgar  MRN: [id number]  [place] 1995  Date ofevaluation: 6/10/2019  Provider: David Qu[address]       Chief Complaint   Patient presents with    Chest Pain     rapid heart rate  since yesterday  afternoon. pt is 17 weeks pregnant         HISTORY OF PRESENT ILLNESS   (Location/Symptom, Timing/Onset,Context/Setting, Quality, Duration, Modifying Factors, Severity)  Note limiting factors. Dina Apgar is a 26 y.o. female who presents to the emergency department . This patient who is 17 weeks pregnant  2 para 0-0-1-0 presents with intermittent pain to the left lower ribs. It comes and goes. Does not cause her to be short of breath. She has no cough or congestion. Denies fever. No leg swelling. She did not take anything for the discomfort. HPI    NursingNotes were reviewed. REVIEW OF SYSTEMS    (2-9 systems for level 4, 10 or more for level 5)     Review of Systems   Constitutional: Negative for activity change, appetite change and fatigue. HENT: Negative for congestion and sore throat. Eyes: Negative for pain and visual disturbance. Respiratory: Negative for chest tightness and shortness of breath. Cardiovascular: Positive for chest pain. Gastrointestinal: Negative for abdominal pain, nausea and vomiting. Endocrine: Negative for polydipsia. Genitourinary: Negative for flank pain and urgency. Musculoskeletal: Negative for gait problem and neck stiffness. Skin: Negative for rash. Neurological: Negative for weakness, light-headedness and headaches. Psychiatric/Behavioral: Negative for confusion and sleep disturbance. Except as noted above the remainder of the review of systems was reviewed and negative.        PAST MEDICAL HISTORY     Past Medical History:   Diagnosis Date    Cough variant asthma 2014    Hodgkin disease ([facility] [address]     Hodgkin's SURGICALHISTORY       Past Surgical History:   Procedure Laterality Date    BREAST FIBROADENOMA SURGERY  01/2013    TYMPANOSTOMY TUBE PLACEMENT  1997         CURRENT MEDICATIONS       Previous Medications    LORATADINE (CLARITIN) 10 MG TABLET    Take 10 mg by mouth daily    PRENATAL MULTIVIT-MIN-FE-FA (PRENATAL VITAMINS PO)    Take by mouth       ALLERGIES     Penicillins    FAMILY HISTORY       Family History   Problem Relation Age of Onset    High Blood Pressure Mother     High Blood Pressure Father     High Blood Pressure Maternal Aunt     High Blood Pressure Maternal Grandmother     High Blood Pressure Maternal Grandfather     Diabetes Other         mom's side    Cancer Neg Hx     Breast Cancer Neg Hx     Prostate Cancer Neg Hx     Depression Neg Hx           SOCIAL HISTORY       Social History     Socioeconomic History    Marital status: Single     Spouse name: None    Number of children: None    Years of education: None    Highest education level: None   Occupational History    None   Social Needs    Financial resource strain: None    Food insecurity:     Worry: None     Inability: None    Transportation needs:     Medical: None     Non-medical: None   Tobacco Use    Smoking status: Never Smoker    Smokeless tobacco: Never Used   Substance and Sexual Activity    Alcohol use: No    Drug use: No    Sexual activity: Yes     Partners: Male   Lifestyle    Physical activity:     Days per week: None     Minutes per session: None    Stress: None   Relationships    Social connections:     Talks on phone: None     Gets together: None     Attends Taoism service: None     Active member of club or organization: None     Attends meetings of clubs or organizations: None     Relationship status: None    Intimate partner violence:     Fear of current or ex partner: None     Emotionally abused: None     Physically abused: None     Forced sexual activity: None   Other Topics Concern    None   Social History Narrative    None       SCREENINGS              PHYSICAL EXAM    (up to 7 for level 4, 8 or more for level 5)     ED Triage Vitals [06/10/19 1733]   BP Temp Temp Source Pulse Resp SpO2 Height Weight   116/70 98.5 °F (36.9 °C) Oral 95 16 99 % 5' 2\" (1.575 m) 108 lb (49 kg)       Physical Exam   Constitutional: She is oriented to person, place, and time. She appears well-developed and well-nourished. No distress. HENT:   Head: Normocephalic and atraumatic. Right Ear: External ear normal.   Left Ear: External ear normal.   Mouth/Throat: No oropharyngeal exudate. Eyes: Pupils are equal, round, and reactive to light. Conjunctivae are normal.   Neck: Normal range of motion. Neck supple. No JVD present. No tracheal deviation present. No thyromegaly present. Cardiovascular: Normal rate and normal heart sounds. No murmur heard. Pulmonary/Chest: Effort normal and breath sounds normal. No respiratory distress. She has no wheezes. Points to left lower lateral ribs. Not tender on my palpation. No crepitance or subcu emphysema lungs completely clear   Abdominal: Soft. Bowel sounds are normal. There is no tenderness. There is no guarding. Musculoskeletal: Normal range of motion. She exhibits no edema. Neurological: She is alert and oriented to person, place, and time. No cranial nerve deficit. Skin: Skin is warm and dry. No rash noted. She is not diaphoretic. Psychiatric: She has a normal mood and affect. Her behavior is normal.   Nursing note and vitals reviewed.       DIAGNOSTIC RESULTS     EKG: All EKG's are interpreted by the Emergency Department Physician who either signs or Co-signsthis chart in the absence of a cardiologist.        RADIOLOGY:   Non-plain filmimages such as CT, Ultrasound and MRI are read by the radiologist. Plain radiographic images are visualized and preliminarily interpreted by the emergency physician with the below findings:        Interpretation per the Radiologist below, if available at the time ofthis note:    No orders to display         ED BEDSIDE ULTRASOUND:   Performed by ED Physician - none    LABS:  Labs Reviewed - No data to display    All other labs were within normal range or not returned as of this dictation. EMERGENCY DEPARTMENT COURSE and DIFFERENTIAL DIAGNOSIS/MDM:   Vitals:    Vitals:    06/10/19 1733   BP: 116/70   Pulse: 95   Resp: 16   Temp: 98.5 °F (36.9 °C)   TempSrc: Oral   SpO2: 99%   Weight: 108 lb (49 kg)   Height: 5' 2\" (1.575 m)       Patient came in with atypical chest wall pain. Tylenol helped. I do not feel that she needs imaging nor do I believe she is having a heart attack or PE. Patient will follow-up with her gynecologist    [facility]      [address] [facility]   Total Critical Care time was 0 minutes, excluding separatelyreportable procedures. There was a high probability ofclinically significant/life threatening deterioration in the patient's condition which required my urgent intervention. CONSULTS:  None    PROCEDURES:  Unless otherwise noted below, none     Procedures    FINAL IMPRESSION      1. Chest wall pain          DISPOSITION/PLAN   DISPOSITION        PATIENT REFERREDTO:  ADRIAN Mitchell - CNP  [address], [address]  [facility].            DISCHARGEMEDICATIONS:  New Prescriptions    No medications on file     Controlled Substances Monitoring:     No flowsheet data found.     (Please note that portions of this note were completed with a voice recognition program.  Efforts were made to edit the dictations but occasionally words are mis-transcribed.)    Evelyne Frausto DO (electronically signed)  Attending Emergency Physician          Evelyne Frausto DO  06/10/19 3843

## 2019-06-28 ENCOUNTER — HOSPITAL ENCOUNTER (OUTPATIENT)
Dept: ULTRASOUND IMAGING | Age: 24
Discharge: HOME | End: 2019-06-30
Payer: MEDICAID

## 2019-06-28 DIAGNOSIS — Z34.80 SUPERVISION OF OTHER NORMAL PREGNANCY, ANTEPARTUM: ICD-10-CM

## 2019-06-28 DIAGNOSIS — Z3A.15 15 WEEKS GESTATION OF PREGNANCY: ICD-10-CM

## 2019-06-28 PROCEDURE — 76805 OB US >/= 14 WKS SNGL FETUS: CPT

## 2019-06-29 ENCOUNTER — HOSPITAL ENCOUNTER (OUTPATIENT)
Age: 24
Discharge: HOME | End: 2019-06-29
Attending: OBSTETRICS & GYNECOLOGY | Admitting: OBSTETRICS & GYNECOLOGY
Payer: MEDICAID

## 2019-06-29 VITALS
RESPIRATION RATE: 18 BRPM | DIASTOLIC BLOOD PRESSURE: 75 MMHG | WEIGHT: 116 LBS | SYSTOLIC BLOOD PRESSURE: 106 MMHG | TEMPERATURE: 98.1 F | HEIGHT: 62 IN | HEART RATE: 75 BPM | BODY MASS INDEX: 21.35 KG/M2

## 2019-06-29 DIAGNOSIS — R31.29 HEMATURIA, MICROSCOPIC: Primary | ICD-10-CM

## 2019-06-29 LAB
AMPHETAMINE SCREEN, URINE: NORMAL
BACTERIA: NEGATIVE /HPF
BARBITURATE SCREEN URINE: NORMAL
BASOPHILS ABSOLUTE: 0 K/UL (ref 0–0.2)
BASOPHILS RELATIVE PERCENT: 0.4 %
BENZODIAZEPINE SCREEN, URINE: NORMAL
BILIRUBIN URINE: NEGATIVE
BLOOD, URINE: ABNORMAL
CANNABINOID SCREEN URINE: NORMAL
CLARITY: CLEAR
COCAINE METABOLITE SCREEN URINE: NORMAL
COLOR: YELLOW
EOSINOPHILS ABSOLUTE: 0.2 K/UL (ref 0–0.7)
EOSINOPHILS RELATIVE PERCENT: 3.5 %
EPITHELIAL CELLS, UA: ABNORMAL /HPF (ref 0–5)
GLUCOSE URINE: NEGATIVE MG/DL
HCT VFR BLD CALC: 33.3 % (ref 37–47)
HEMOGLOBIN: 11.6 G/DL (ref 12–16)
HYALINE CASTS: ABNORMAL /HPF (ref 0–5)
KETONES, URINE: NEGATIVE MG/DL
LEUKOCYTE ESTERASE, URINE: NEGATIVE
LYMPHOCYTES ABSOLUTE: 1.4 K/UL (ref 1–4.8)
LYMPHOCYTES RELATIVE PERCENT: 23.8 %
Lab: NORMAL
MCH RBC QN AUTO: 34.5 PG (ref 27–31.3)
MCHC RBC AUTO-ENTMCNC: 34.9 % (ref 33–37)
MCV RBC AUTO: 98.8 FL (ref 82–100)
MONOCYTES ABSOLUTE: 0.3 K/UL (ref 0.2–0.8)
MONOCYTES RELATIVE PERCENT: 5.6 %
NEUTROPHILS ABSOLUTE: 3.9 K/UL (ref 1.4–6.5)
NEUTROPHILS RELATIVE PERCENT: 66.7 %
NITRITE, URINE: NEGATIVE
OPIATE SCREEN URINE: NORMAL
PDW BLD-RTO: 13.2 % (ref 11.5–14.5)
PH UA: 7 (ref 5–9)
PHENCYCLIDINE SCREEN URINE: NORMAL
PLATELET # BLD: 200 K/UL (ref 130–400)
PROTEIN UA: NEGATIVE MG/DL
RBC # BLD: 3.37 M/UL (ref 4.2–5.4)
RBC UA: ABNORMAL /HPF (ref 0–5)
SPECIFIC GRAVITY UA: 1.02 (ref 1–1.03)
UROBILINOGEN, URINE: 0.2 E.U./DL
WBC # BLD: 5.8 K/UL (ref 4.8–10.8)
WBC UA: ABNORMAL /HPF (ref 0–5)

## 2019-06-29 PROCEDURE — 85025 COMPLETE CBC W/AUTO DIFF WBC: CPT

## 2019-06-29 PROCEDURE — 87086 URINE CULTURE/COLONY COUNT: CPT

## 2019-06-29 PROCEDURE — 6360000002 HC RX W HCPCS: Performed by: OBSTETRICS & GYNECOLOGY

## 2019-06-29 PROCEDURE — 80307 DRUG TEST PRSMV CHEM ANLYZR: CPT

## 2019-06-29 PROCEDURE — 81001 URINALYSIS AUTO W/SCOPE: CPT

## 2019-06-29 PROCEDURE — 99284 EMERGENCY DEPT VISIT MOD MDM: CPT

## 2019-06-29 PROCEDURE — 2580000003 HC RX 258: Performed by: OBSTETRICS & GYNECOLOGY

## 2019-06-29 PROCEDURE — 99283 EMERGENCY DEPT VISIT LOW MDM: CPT | Performed by: OBSTETRICS & GYNECOLOGY

## 2019-06-29 PROCEDURE — 96365 THER/PROPH/DIAG IV INF INIT: CPT

## 2019-06-29 PROCEDURE — 36415 COLL VENOUS BLD VENIPUNCTURE: CPT

## 2019-06-29 RX ORDER — SODIUM CHLORIDE 0.9 % (FLUSH) 0.9 %
10 SYRINGE (ML) INJECTION EVERY 12 HOURS SCHEDULED
Status: DISCONTINUED | OUTPATIENT
Start: 2019-06-29 | End: 2019-06-29 | Stop reason: HOSPADM

## 2019-06-29 RX ORDER — SODIUM CHLORIDE, SODIUM LACTATE, POTASSIUM CHLORIDE, AND CALCIUM CHLORIDE .6; .31; .03; .02 G/100ML; G/100ML; G/100ML; G/100ML
500 INJECTION, SOLUTION INTRAVENOUS ONCE
Status: COMPLETED | OUTPATIENT
Start: 2019-06-29 | End: 2019-06-29

## 2019-06-29 RX ORDER — CALCIUM CARBONATE 200(500)MG
1 TABLET,CHEWABLE ORAL 3 TIMES DAILY PRN
COMMUNITY
End: 2019-12-24

## 2019-06-29 RX ORDER — SODIUM CHLORIDE 0.9 % (FLUSH) 0.9 %
10 SYRINGE (ML) INJECTION PRN
Status: DISCONTINUED | OUTPATIENT
Start: 2019-06-29 | End: 2019-06-29 | Stop reason: HOSPADM

## 2019-06-29 RX ORDER — NITROFURANTOIN 25; 75 MG/1; MG/1
100 CAPSULE ORAL 2 TIMES DAILY
Qty: 20 CAPSULE | Refills: 0 | Status: SHIPPED | OUTPATIENT
Start: 2019-06-29 | End: 2019-07-09

## 2019-06-29 RX ADMIN — GENTAMICIN SULFATE 78 MG: 40 INJECTION, SOLUTION INTRAMUSCULAR; INTRAVENOUS at 09:19

## 2019-06-29 RX ADMIN — SODIUM CHLORIDE, POTASSIUM CHLORIDE, SODIUM LACTATE AND CALCIUM CHLORIDE: 600; 310; 30; 20 INJECTION, SOLUTION INTRAVENOUS at 09:02

## 2019-06-29 NOTE — PROGRESS NOTES
Arrives to triage with c/o \"vaginal, abdominal and back pain\" . Into BR, instructed on UA sample, verbalized understanding. UA sample obtained and sent to lab.

## 2019-07-01 LAB — URINE CULTURE, ROUTINE: NORMAL

## 2019-07-03 DIAGNOSIS — R31.29 HEMATURIA, MICROSCOPIC: ICD-10-CM

## 2019-07-03 LAB
BACTERIA: NEGATIVE /HPF
BILIRUBIN URINE: NEGATIVE
BLOOD, URINE: ABNORMAL
CLARITY: CLEAR
COLOR: YELLOW
EPITHELIAL CELLS, UA: ABNORMAL /HPF (ref 0–5)
GLUCOSE URINE: NEGATIVE MG/DL
HYALINE CASTS: ABNORMAL /HPF (ref 0–5)
KETONES, URINE: NEGATIVE MG/DL
LEUKOCYTE ESTERASE, URINE: ABNORMAL
NITRITE, URINE: NEGATIVE
PH UA: 7 (ref 5–9)
PROTEIN UA: NEGATIVE MG/DL
RBC UA: ABNORMAL /HPF (ref 0–5)
SPECIFIC GRAVITY UA: 1.01 (ref 1–1.03)
UROBILINOGEN, URINE: 0.2 E.U./DL
WBC UA: ABNORMAL /HPF (ref 0–5)

## 2019-07-08 ENCOUNTER — ROUTINE PRENATAL (OUTPATIENT)
Dept: OBGYN CLINIC | Age: 24
End: 2019-07-08
Payer: MEDICAID

## 2019-07-08 VITALS
BODY MASS INDEX: 21.58 KG/M2 | WEIGHT: 118 LBS | SYSTOLIC BLOOD PRESSURE: 100 MMHG | HEART RATE: 78 BPM | DIASTOLIC BLOOD PRESSURE: 64 MMHG

## 2019-07-08 DIAGNOSIS — Z34.80 SUPERVISION OF OTHER NORMAL PREGNANCY: Primary | ICD-10-CM

## 2019-07-08 DIAGNOSIS — Z3A.21 21 WEEKS GESTATION OF PREGNANCY: ICD-10-CM

## 2019-07-08 PROCEDURE — 1036F TOBACCO NON-USER: CPT | Performed by: OBSTETRICS & GYNECOLOGY

## 2019-07-08 PROCEDURE — G8427 DOCREV CUR MEDS BY ELIG CLIN: HCPCS | Performed by: OBSTETRICS & GYNECOLOGY

## 2019-07-08 PROCEDURE — G8420 CALC BMI NORM PARAMETERS: HCPCS | Performed by: OBSTETRICS & GYNECOLOGY

## 2019-07-08 PROCEDURE — 99213 OFFICE O/P EST LOW 20 MIN: CPT | Performed by: OBSTETRICS & GYNECOLOGY

## 2019-07-08 RX ORDER — FAMOTIDINE 20 MG/1
20 TABLET, FILM COATED ORAL DAILY
Qty: 60 TABLET | Refills: 3 | Status: SHIPPED | OUTPATIENT
Start: 2019-07-08 | End: 2019-12-24

## 2019-08-07 ENCOUNTER — TELEPHONE (OUTPATIENT)
Dept: OBGYN CLINIC | Age: 24
End: 2019-08-07

## 2019-08-07 ENCOUNTER — HOSPITAL ENCOUNTER (OUTPATIENT)
Age: 24
Discharge: HOME | End: 2019-08-07
Attending: OBSTETRICS & GYNECOLOGY | Admitting: OBSTETRICS & GYNECOLOGY
Payer: MEDICAID

## 2019-08-07 VITALS
DIASTOLIC BLOOD PRESSURE: 63 MMHG | TEMPERATURE: 98.7 F | SYSTOLIC BLOOD PRESSURE: 101 MMHG | RESPIRATION RATE: 16 BRPM | HEART RATE: 120 BPM

## 2019-08-07 LAB
ALBUMIN SERPL-MCNC: 3.4 G/DL (ref 3.5–4.6)
ALP BLD-CCNC: 50 U/L (ref 40–130)
ALT SERPL-CCNC: 12 U/L (ref 0–33)
AMPHETAMINE SCREEN, URINE: NORMAL
AMYLASE: 61 U/L (ref 22–93)
ANION GAP SERPL CALCULATED.3IONS-SCNC: 11 MEQ/L (ref 9–15)
AST SERPL-CCNC: 15 U/L (ref 0–35)
BARBITURATE SCREEN URINE: NORMAL
BASOPHILS ABSOLUTE: 0 K/UL (ref 0–0.2)
BASOPHILS RELATIVE PERCENT: 0.4 %
BENZODIAZEPINE SCREEN, URINE: NORMAL
BILIRUB SERPL-MCNC: <0.2 MG/DL (ref 0.2–0.7)
BILIRUBIN URINE: NEGATIVE
BLOOD, URINE: NEGATIVE
BUN BLDV-MCNC: 21 MG/DL (ref 6–20)
CALCIUM SERPL-MCNC: 8.8 MG/DL (ref 8.5–9.9)
CANNABINOID SCREEN URINE: NORMAL
CHLORIDE BLD-SCNC: 105 MEQ/L (ref 95–107)
CLARITY: ABNORMAL
CLUE CELLS: NORMAL
CO2: 24 MEQ/L (ref 20–31)
COCAINE METABOLITE SCREEN URINE: NORMAL
COLOR: YELLOW
CREAT SERPL-MCNC: 0.59 MG/DL (ref 0.5–0.9)
EOSINOPHILS ABSOLUTE: 0.1 K/UL (ref 0–0.7)
EOSINOPHILS RELATIVE PERCENT: 0.8 %
FETAL FIBRONECTIN: NEGATIVE
GFR AFRICAN AMERICAN: >60
GFR NON-AFRICAN AMERICAN: >60
GLOBULIN: 2.2 G/DL (ref 2.3–3.5)
GLUCOSE BLD-MCNC: 86 MG/DL (ref 70–99)
GLUCOSE URINE: NEGATIVE MG/DL
HCT VFR BLD CALC: 30.4 % (ref 37–47)
HEMOGLOBIN: 10.9 G/DL (ref 12–16)
KETONES, URINE: NEGATIVE MG/DL
LEUKOCYTE ESTERASE, URINE: NEGATIVE
LIPASE: 38 U/L (ref 12–95)
LYMPHOCYTES ABSOLUTE: 1.3 K/UL (ref 1–4.8)
LYMPHOCYTES RELATIVE PERCENT: 19.5 %
Lab: NORMAL
MCH RBC QN AUTO: 35.4 PG (ref 27–31.3)
MCHC RBC AUTO-ENTMCNC: 36 % (ref 33–37)
MCV RBC AUTO: 98.2 FL (ref 82–100)
MONOCYTES ABSOLUTE: 0.5 K/UL (ref 0.2–0.8)
MONOCYTES RELATIVE PERCENT: 6.8 %
NEUTROPHILS ABSOLUTE: 5 K/UL (ref 1.4–6.5)
NEUTROPHILS RELATIVE PERCENT: 72.5 %
NITRITE, URINE: NEGATIVE
OPIATE SCREEN URINE: NORMAL
PDW BLD-RTO: 12.9 % (ref 11.5–14.5)
PH UA: 7 (ref 5–9)
PHENCYCLIDINE SCREEN URINE: NORMAL
PLATELET # BLD: 204 K/UL (ref 130–400)
POTASSIUM SERPL-SCNC: 3.9 MEQ/L (ref 3.4–4.9)
PROTEIN UA: NEGATIVE MG/DL
RBC # BLD: 3.1 M/UL (ref 4.2–5.4)
SODIUM BLD-SCNC: 140 MEQ/L (ref 135–144)
SPECIFIC GRAVITY UA: 1.02 (ref 1–1.03)
TOTAL PROTEIN: 5.6 G/DL (ref 6.3–8)
TRICHOMONAS PREP: NORMAL
TRICHOMONAS VAGINALIS SCREEN: NEGATIVE
UROBILINOGEN, URINE: 0.2 E.U./DL
WBC # BLD: 6.9 K/UL (ref 4.8–10.8)
YEAST WET PREP: NORMAL

## 2019-08-07 PROCEDURE — 87491 CHLMYD TRACH DNA AMP PROBE: CPT

## 2019-08-07 PROCEDURE — 87070 CULTURE OTHR SPECIMN AEROBIC: CPT

## 2019-08-07 PROCEDURE — 82150 ASSAY OF AMYLASE: CPT

## 2019-08-07 PROCEDURE — 81003 URINALYSIS AUTO W/O SCOPE: CPT

## 2019-08-07 PROCEDURE — 36415 COLL VENOUS BLD VENIPUNCTURE: CPT

## 2019-08-07 PROCEDURE — 83690 ASSAY OF LIPASE: CPT

## 2019-08-07 PROCEDURE — 59025 FETAL NON-STRESS TEST: CPT

## 2019-08-07 PROCEDURE — 87660 TRICHOMONAS VAGIN DIR PROBE: CPT

## 2019-08-07 PROCEDURE — 80053 COMPREHEN METABOLIC PANEL: CPT

## 2019-08-07 PROCEDURE — 80307 DRUG TEST PRSMV CHEM ANLYZR: CPT

## 2019-08-07 PROCEDURE — 85025 COMPLETE CBC W/AUTO DIFF WBC: CPT

## 2019-08-07 PROCEDURE — 82731 ASSAY OF FETAL FIBRONECTIN: CPT

## 2019-08-07 PROCEDURE — 99283 EMERGENCY DEPT VISIT LOW MDM: CPT

## 2019-08-07 PROCEDURE — 99282 EMERGENCY DEPT VISIT SF MDM: CPT | Performed by: OBSTETRICS & GYNECOLOGY

## 2019-08-07 PROCEDURE — 87591 N.GONORRHOEAE DNA AMP PROB: CPT

## 2019-08-07 PROCEDURE — 87210 SMEAR WET MOUNT SALINE/INK: CPT

## 2019-08-07 RX ORDER — ACETAMINOPHEN 325 MG/1
650 TABLET ORAL EVERY 4 HOURS PRN
Status: DISCONTINUED | OUTPATIENT
Start: 2019-08-07 | End: 2019-08-07 | Stop reason: HOSPADM

## 2019-08-07 NOTE — PROGRESS NOTES
Pt hher with complaints of law back pain, lower abdominal cramping and sharp vaginal pain. States she had blood in urine several weeks ago and was given uti meds , is feeling same symptoms now. No vaginal bleeding or leaking.

## 2019-08-10 LAB — GENITAL CULTURE, ROUTINE: NORMAL

## 2019-08-12 LAB
C TRACH DNA GENITAL QL NAA+PROBE: NEGATIVE
N. GONORRHOEAE DNA: NEGATIVE

## 2019-08-19 ENCOUNTER — ROUTINE PRENATAL (OUTPATIENT)
Dept: OBGYN CLINIC | Age: 24
End: 2019-08-19
Payer: MEDICAID

## 2019-08-19 VITALS
DIASTOLIC BLOOD PRESSURE: 68 MMHG | HEART RATE: 102 BPM | SYSTOLIC BLOOD PRESSURE: 100 MMHG | BODY MASS INDEX: 23.78 KG/M2 | WEIGHT: 130 LBS

## 2019-08-19 DIAGNOSIS — Z34.82 ENCOUNTER FOR SUPERVISION OF OTHER NORMAL PREGNANCY IN SECOND TRIMESTER: Primary | ICD-10-CM

## 2019-08-19 DIAGNOSIS — R00.0 TACHYCARDIA: ICD-10-CM

## 2019-08-19 DIAGNOSIS — Z3A.27 27 WEEKS GESTATION OF PREGNANCY: ICD-10-CM

## 2019-08-19 PROCEDURE — G8420 CALC BMI NORM PARAMETERS: HCPCS | Performed by: OBSTETRICS & GYNECOLOGY

## 2019-08-19 PROCEDURE — 99213 OFFICE O/P EST LOW 20 MIN: CPT | Performed by: OBSTETRICS & GYNECOLOGY

## 2019-08-19 PROCEDURE — 1036F TOBACCO NON-USER: CPT | Performed by: OBSTETRICS & GYNECOLOGY

## 2019-08-19 PROCEDURE — G8427 DOCREV CUR MEDS BY ELIG CLIN: HCPCS | Performed by: OBSTETRICS & GYNECOLOGY

## 2019-08-26 ENCOUNTER — HOSPITAL ENCOUNTER (OUTPATIENT)
Dept: ULTRASOUND IMAGING | Age: 24
Discharge: HOME | End: 2019-08-28
Payer: MEDICAID

## 2019-08-26 DIAGNOSIS — Z3A.27 27 WEEKS GESTATION OF PREGNANCY: ICD-10-CM

## 2019-08-26 DIAGNOSIS — Z34.82 ENCOUNTER FOR SUPERVISION OF OTHER NORMAL PREGNANCY IN SECOND TRIMESTER: ICD-10-CM

## 2019-08-26 PROCEDURE — 76815 OB US LIMITED FETUS(S): CPT

## 2019-08-28 ENCOUNTER — NURSE ONLY (OUTPATIENT)
Dept: OBGYN CLINIC | Age: 24
End: 2019-08-28

## 2019-08-28 DIAGNOSIS — Z34.83 ENCOUNTER FOR SUPERVISION OF OTHER NORMAL PREGNANCY IN THIRD TRIMESTER: ICD-10-CM

## 2019-08-28 DIAGNOSIS — Z34.83 ENCOUNTER FOR SUPERVISION OF OTHER NORMAL PREGNANCY IN THIRD TRIMESTER: Primary | ICD-10-CM

## 2019-08-28 DIAGNOSIS — Z3A.28 28 WEEKS GESTATION OF PREGNANCY: ICD-10-CM

## 2019-08-28 LAB
GLUCOSE, 1HR PP: 105 MG/DL (ref 60–140)
HCT VFR BLD CALC: 32.5 % (ref 37–47)
HEMOGLOBIN: 11 G/DL (ref 12–16)

## 2019-09-03 ENCOUNTER — HOSPITAL ENCOUNTER (EMERGENCY)
Age: 24
Discharge: HOME | End: 2019-09-03
Payer: MEDICAID

## 2019-09-03 ENCOUNTER — TELEPHONE (OUTPATIENT)
Dept: OBGYN CLINIC | Age: 24
End: 2019-09-03

## 2019-09-03 VITALS
OXYGEN SATURATION: 100 % | DIASTOLIC BLOOD PRESSURE: 68 MMHG | TEMPERATURE: 98.2 F | RESPIRATION RATE: 19 BRPM | HEIGHT: 63 IN | HEART RATE: 87 BPM | WEIGHT: 130 LBS | BODY MASS INDEX: 23.04 KG/M2 | SYSTOLIC BLOOD PRESSURE: 100 MMHG

## 2019-09-03 DIAGNOSIS — R00.2 PALPITATIONS: Primary | ICD-10-CM

## 2019-09-03 DIAGNOSIS — Z34.90 PREGNANCY, UNSPECIFIED GESTATIONAL AGE: ICD-10-CM

## 2019-09-03 LAB
ALBUMIN SERPL-MCNC: 3.5 G/DL (ref 3.5–4.6)
ALP BLD-CCNC: 68 U/L (ref 40–130)
ALT SERPL-CCNC: 11 U/L (ref 0–33)
ANION GAP SERPL CALCULATED.3IONS-SCNC: 14 MEQ/L (ref 9–15)
AST SERPL-CCNC: 16 U/L (ref 0–35)
BACTERIA: NEGATIVE /HPF
BASOPHILS ABSOLUTE: 0 K/UL (ref 0–0.2)
BASOPHILS RELATIVE PERCENT: 0.4 %
BILIRUB SERPL-MCNC: 0.3 MG/DL (ref 0.2–0.7)
BILIRUBIN URINE: NEGATIVE
BLOOD, URINE: ABNORMAL
BUN BLDV-MCNC: 17 MG/DL (ref 6–20)
CALCIUM SERPL-MCNC: 9.1 MG/DL (ref 8.5–9.9)
CHLORIDE BLD-SCNC: 99 MEQ/L (ref 95–107)
CLARITY: CLEAR
CO2: 24 MEQ/L (ref 20–31)
COLOR: YELLOW
CREAT SERPL-MCNC: 0.48 MG/DL (ref 0.5–0.9)
EKG ATRIAL RATE: 83 BPM
EKG P AXIS: 28 DEGREES
EKG P-R INTERVAL: 132 MS
EKG Q-T INTERVAL: 376 MS
EKG QRS DURATION: 74 MS
EKG QTC CALCULATION (BAZETT): 441 MS
EKG R AXIS: 50 DEGREES
EKG T AXIS: 18 DEGREES
EKG VENTRICULAR RATE: 83 BPM
EOSINOPHILS ABSOLUTE: 0.1 K/UL (ref 0–0.7)
EOSINOPHILS RELATIVE PERCENT: 0.8 %
EPITHELIAL CELLS, UA: ABNORMAL /HPF (ref 0–5)
GFR AFRICAN AMERICAN: >60
GFR NON-AFRICAN AMERICAN: >60
GLOBULIN: 2.9 G/DL (ref 2.3–3.5)
GLUCOSE BLD-MCNC: 73 MG/DL (ref 70–99)
GLUCOSE URINE: NEGATIVE MG/DL
HCT VFR BLD CALC: 33.4 % (ref 37–47)
HEMOGLOBIN: 11.6 G/DL (ref 12–16)
HYALINE CASTS: ABNORMAL /HPF (ref 0–5)
KETONES, URINE: 15 MG/DL
LEUKOCYTE ESTERASE, URINE: ABNORMAL
LYMPHOCYTES ABSOLUTE: 1.2 K/UL (ref 1–4.8)
LYMPHOCYTES RELATIVE PERCENT: 18.5 %
MAGNESIUM: 1.7 MG/DL (ref 1.7–2.4)
MCH RBC QN AUTO: 34 PG (ref 27–31.3)
MCHC RBC AUTO-ENTMCNC: 34.9 % (ref 33–37)
MCV RBC AUTO: 97.5 FL (ref 82–100)
MONOCYTES ABSOLUTE: 0.5 K/UL (ref 0.2–0.8)
MONOCYTES RELATIVE PERCENT: 6.8 %
NEUTROPHILS ABSOLUTE: 4.9 K/UL (ref 1.4–6.5)
NEUTROPHILS RELATIVE PERCENT: 73.5 %
NITRITE, URINE: NEGATIVE
PDW BLD-RTO: 12.7 % (ref 11.5–14.5)
PH UA: 6.5 (ref 5–9)
PLATELET # BLD: 191 K/UL (ref 130–400)
POTASSIUM SERPL-SCNC: 4 MEQ/L (ref 3.4–4.9)
PROTEIN UA: NEGATIVE MG/DL
RBC # BLD: 3.42 M/UL (ref 4.2–5.4)
RBC UA: ABNORMAL /HPF (ref 0–5)
SODIUM BLD-SCNC: 137 MEQ/L (ref 135–144)
SPECIFIC GRAVITY UA: 1.01 (ref 1–1.03)
TOTAL CK: 55 U/L (ref 0–170)
TOTAL PROTEIN: 6.4 G/DL (ref 6.3–8)
TROPONIN: <0.01 NG/ML (ref 0–0.01)
URINE REFLEX TO CULTURE: YES
UROBILINOGEN, URINE: 0.2 E.U./DL
WBC # BLD: 6.6 K/UL (ref 4.8–10.8)
WBC UA: ABNORMAL /HPF (ref 0–5)

## 2019-09-03 PROCEDURE — 82550 ASSAY OF CK (CPK): CPT

## 2019-09-03 PROCEDURE — 80053 COMPREHEN METABOLIC PANEL: CPT

## 2019-09-03 PROCEDURE — 83735 ASSAY OF MAGNESIUM: CPT

## 2019-09-03 PROCEDURE — 81001 URINALYSIS AUTO W/SCOPE: CPT

## 2019-09-03 PROCEDURE — 99284 EMERGENCY DEPT VISIT MOD MDM: CPT

## 2019-09-03 PROCEDURE — 87086 URINE CULTURE/COLONY COUNT: CPT

## 2019-09-03 PROCEDURE — 85025 COMPLETE CBC W/AUTO DIFF WBC: CPT

## 2019-09-03 PROCEDURE — 93005 ELECTROCARDIOGRAM TRACING: CPT | Performed by: PHYSICIAN ASSISTANT

## 2019-09-03 PROCEDURE — 36415 COLL VENOUS BLD VENIPUNCTURE: CPT

## 2019-09-03 PROCEDURE — 84484 ASSAY OF TROPONIN QUANT: CPT

## 2019-09-03 ASSESSMENT — ENCOUNTER SYMPTOMS
VOICE CHANGE: 0
NAUSEA: 1
COUGH: 0
APNEA: 0
EYE DISCHARGE: 0
PHOTOPHOBIA: 0
SHORTNESS OF BREATH: 0
ANAL BLEEDING: 0
ABDOMINAL DISTENTION: 0
VOMITING: 0

## 2019-09-03 NOTE — ED PROVIDER NOTES
week: Not on file     Minutes per session: Not on file    Stress: Not on file   Relationships    Social connections:     Talks on phone: Not on file     Gets together: Not on file     Attends Church service: Not on file     Active member of club or organization: Not on file     Attends meetings of clubs or organizations: Not on file     Relationship status: Not on file    Intimate partner violence:     Fear of current or ex partner: Not on file     Emotionally abused: Not on file     Physically abused: Not on file     Forced sexual activity: Not on file   Other Topics Concern    Not on file   Social History Narrative    Not on file       SCREENINGS      @PMDN([id number])@      PHYSICAL EXAM    (up to 7 for level 4, 8 or more for level 5)     ED Triage Vitals [09/03/19 1429]   BP Temp Temp Source Pulse Resp SpO2 Height Weight   96/67 98.2 °F (36.8 °C) Oral 94 18 98 % 5' 3\" (1.6 m) 130 lb (59 kg)       Physical Exam   Constitutional: She is oriented to person, place, and time. She appears well-developed and well-nourished. No distress. HENT:   Head: Normocephalic and atraumatic. Mouth/Throat: Oropharynx is clear and moist.   Eyes: Pupils are equal, round, and reactive to light. Conjunctivae and EOM are normal. Right eye exhibits no discharge. Left eye exhibits no discharge. Neck: Normal range of motion. Neck supple. Cardiovascular: Normal rate, regular rhythm, normal heart sounds and intact distal pulses. Pulmonary/Chest: Effort normal and breath sounds normal. No stridor. No respiratory distress. She has no wheezes. She has no rales. Abdominal: Soft. Bowel sounds are normal. She exhibits no distension. There is no tenderness. Musculoskeletal: Normal range of motion. She exhibits no edema. Negative pitting edema bilateral lower extremities noted   Neurological: She is alert and oriented to person, place, and time. She exhibits normal muscle tone. Skin: Skin is warm. No erythema.    Psychiatric: She has a normal mood and affect. Nursing note and vitals reviewed. DIAGNOSTIC RESULTS     EKG: All EKG's are interpreted by the Emergency Department Physician who either signs or Co-signsthis chart in the absence of a cardiologist.    EKG normal sinus rate 83- ST segment elevation. RADIOLOGY:   Non-plain filmimages such as CT, Ultrasound and MRI are read by the radiologist. Plain radiographic images are visualized and preliminarily interpreted by the emergency physician with the below findings:         Interpretation per the Radiologist below, if available at the time ofthis note:    No orders to display         ED BEDSIDE ULTRASOUND:   Performed by ED Physician - none    LABS:  Labs Reviewed   COMPREHENSIVE METABOLIC PANEL - Abnormal; Notable for the following components:       Result Value    CREATININE 0.48 (*)     All other components within normal limits   CBC WITH AUTO DIFFERENTIAL - Abnormal; Notable for the following components:    RBC 3.42 (*)     Hemoglobin 11.6 (*)     Hematocrit 33.4 (*)     MCH 34.0 (*)     All other components within normal limits   URINE RT REFLEX TO CULTURE - Abnormal; Notable for the following components:    Ketones, Urine 15 (*)     Blood, Urine TRACE (*)     Leukocyte Esterase, Urine TRACE (*)     All other components within normal limits   MICROSCOPIC URINALYSIS - Abnormal; Notable for the following components:    RBC, UA 6-10 (*)     All other components within normal limits   URINE CULTURE   MAGNESIUM   CK   TROPONIN       All other labs were within normal range or not returned as of this dictation.     EMERGENCY DEPARTMENT COURSE and DIFFERENTIAL DIAGNOSIS/MDM:   Vitals:    Vitals:    09/03/19 1429   BP: 96/67   Pulse: 94   Resp: 18   Temp: 98.2 °F (36.8 °C)   TempSrc: Oral   SpO2: 98%   Weight: 130 lb (59 kg)   Height: 5' 3\" (1.6 m)            MDM  Number of Diagnoses or Management Options  Palpitations:   Pregnancy, unspecified gestational age:   Diagnosis management comments: dw Dr. Ez Ng including negative Wells score EKG history laboratory work-up. No current symptoms. Patient has appoint with cardiology on Monday she sees  Lonza Seek cardiology also Dr. Rozina Merida for pregnancy. artem Mariscal Fetch 1.7 and is low end of normal range. Per dr campos  Will add mag oxide. It  Is pregnancy category a. Amount and/or Complexity of Data Reviewed  Clinical lab tests: reviewed and ordered  Discuss the patient with other providers: yes        CRITICAL CARE TIME       CONSULTS:  None    PROCEDURES:  Unless otherwise noted below, none     Procedures    FINAL IMPRESSION      1. Palpitations    2.  Pregnancy, unspecified gestational age          DISPOSITION/PLAN   DISPOSITION Decision To Discharge 09/03/2019 04:38:47 PM      PATIENT REFERRED TO:  Yuriy Juarez, [address] [facility], [address] [address]    Schedule an appointment as soon as possible for a visit in 1 day      Nikita Irizarry MD  [address]  [phone]    Call in 1 day      Lynn Solis MD  [address] [facility] [address]  [facility]  [phone]    Call in 1 day      [facility]) ED  [address] [facility]  [phone]    If symptoms worsen      DISCHARGE MEDICATIONS:  New Prescriptions    MAGNESIUM OXIDE (MAGOX 400) 400 (241.3 MG) MG TABS TABLET    Take 1 tablet by mouth daily          (Please note that portions of this note were completed with a voice recognition program.  Efforts were made to edit the dictations but occasionally words are mis-transcribed.)    Louis Lee PA-C (electronically signed)  Attending Emergency Physician       Louis Lee PA-C  09/03/19 DILMA Hernández  09/11/19 [id number]

## 2019-09-04 PROCEDURE — 93010 ELECTROCARDIOGRAM REPORT: CPT | Performed by: INTERNAL MEDICINE

## 2019-09-05 LAB — URINE CULTURE, ROUTINE: NORMAL

## 2019-09-09 ENCOUNTER — OFFICE VISIT (OUTPATIENT)
Dept: CARDIOLOGY CLINIC | Age: 24
End: 2019-09-09
Payer: MEDICAID

## 2019-09-09 ENCOUNTER — ROUTINE PRENATAL (OUTPATIENT)
Dept: OBGYN CLINIC | Age: 24
End: 2019-09-09
Payer: MEDICAID

## 2019-09-09 VITALS
DIASTOLIC BLOOD PRESSURE: 58 MMHG | SYSTOLIC BLOOD PRESSURE: 84 MMHG | BODY MASS INDEX: 23.91 KG/M2 | HEART RATE: 98 BPM | WEIGHT: 135 LBS

## 2019-09-09 VITALS
OXYGEN SATURATION: 99 % | DIASTOLIC BLOOD PRESSURE: 58 MMHG | HEART RATE: 107 BPM | WEIGHT: 136 LBS | SYSTOLIC BLOOD PRESSURE: 120 MMHG | RESPIRATION RATE: 14 BRPM | BODY MASS INDEX: 24.09 KG/M2

## 2019-09-09 DIAGNOSIS — Z3A.30 30 WEEKS GESTATION OF PREGNANCY: ICD-10-CM

## 2019-09-09 DIAGNOSIS — R00.0 TACHYCARDIA: Primary | ICD-10-CM

## 2019-09-09 DIAGNOSIS — Z34.83 ENCOUNTER FOR SUPERVISION OF OTHER NORMAL PREGNANCY IN THIRD TRIMESTER: Primary | ICD-10-CM

## 2019-09-09 PROCEDURE — G8420 CALC BMI NORM PARAMETERS: HCPCS | Performed by: INTERNAL MEDICINE

## 2019-09-09 PROCEDURE — 99244 OFF/OP CNSLTJ NEW/EST MOD 40: CPT | Performed by: INTERNAL MEDICINE

## 2019-09-09 PROCEDURE — G8427 DOCREV CUR MEDS BY ELIG CLIN: HCPCS | Performed by: INTERNAL MEDICINE

## 2019-09-09 PROCEDURE — 93000 ELECTROCARDIOGRAM COMPLETE: CPT | Performed by: INTERNAL MEDICINE

## 2019-09-09 PROCEDURE — 1036F TOBACCO NON-USER: CPT | Performed by: OBSTETRICS & GYNECOLOGY

## 2019-09-09 PROCEDURE — G8427 DOCREV CUR MEDS BY ELIG CLIN: HCPCS | Performed by: OBSTETRICS & GYNECOLOGY

## 2019-09-09 PROCEDURE — G8420 CALC BMI NORM PARAMETERS: HCPCS | Performed by: OBSTETRICS & GYNECOLOGY

## 2019-09-09 PROCEDURE — 99213 OFFICE O/P EST LOW 20 MIN: CPT | Performed by: OBSTETRICS & GYNECOLOGY

## 2019-09-09 ASSESSMENT — ENCOUNTER SYMPTOMS
COLOR CHANGE: 0
ABDOMINAL PAIN: 0
APNEA: 0
CHOKING: 0
CHEST TIGHTNESS: 0
ABDOMINAL DISTENTION: 0
BACK PAIN: 0

## 2019-09-09 NOTE — PROGRESS NOTES
Patient's last menstrual period was 02/10/2019.   Please reference prenatal and OB flow chart for further information  PT here today for routine prenatal care  Pt endorses fetal movement and denies loss of fluid, contractions or vaginal bleeding  Pt without complaints  ROS:  Pt denies headache, dysuria, nausea/vomiting  PE:  BP (!) 84/58   Pulse 98   Wt 135 lb (61.2 kg)   LMP 02/10/2019   BMI 23.91 kg/m²   Gen - Alert and oriented x 3  HEENT- NC/AT, CVS - RRR, Lungs - CTAB  Abd - FH 30  Appropriate fetal growth  1hr 105, hgb 11  US reviewed - ceph, NANCY 18  PT seen by cardiology and f/u scheduled for next month

## 2019-09-25 ENCOUNTER — ROUTINE PRENATAL (OUTPATIENT)
Dept: OBGYN CLINIC | Age: 24
End: 2019-09-25
Payer: MEDICAID

## 2019-09-25 ENCOUNTER — TELEPHONE (OUTPATIENT)
Dept: OBGYN CLINIC | Age: 24
End: 2019-09-25

## 2019-09-25 VITALS
WEIGHT: 140 LBS | HEART RATE: 90 BPM | DIASTOLIC BLOOD PRESSURE: 62 MMHG | BODY MASS INDEX: 24.8 KG/M2 | SYSTOLIC BLOOD PRESSURE: 100 MMHG

## 2019-09-25 DIAGNOSIS — Z34.83 ENCOUNTER FOR SUPERVISION OF OTHER NORMAL PREGNANCY IN THIRD TRIMESTER: Primary | ICD-10-CM

## 2019-09-25 DIAGNOSIS — Z3A.32 32 WEEKS GESTATION OF PREGNANCY: ICD-10-CM

## 2019-09-25 PROCEDURE — G8420 CALC BMI NORM PARAMETERS: HCPCS | Performed by: OBSTETRICS & GYNECOLOGY

## 2019-09-25 PROCEDURE — G8427 DOCREV CUR MEDS BY ELIG CLIN: HCPCS | Performed by: OBSTETRICS & GYNECOLOGY

## 2019-09-25 PROCEDURE — 99213 OFFICE O/P EST LOW 20 MIN: CPT | Performed by: OBSTETRICS & GYNECOLOGY

## 2019-09-25 PROCEDURE — 1036F TOBACCO NON-USER: CPT | Performed by: OBSTETRICS & GYNECOLOGY

## 2019-10-03 ENCOUNTER — HOSPITAL ENCOUNTER (OUTPATIENT)
Dept: NON INVASIVE DIAGNOSTICS | Age: 24
Discharge: HOME | End: 2019-10-03
Payer: MEDICAID

## 2019-10-03 DIAGNOSIS — R00.0 TACHYCARDIA: ICD-10-CM

## 2019-10-03 LAB
LV EF: 65 %
LVEF MODALITY: NORMAL

## 2019-10-03 PROCEDURE — 93306 TTE W/DOPPLER COMPLETE: CPT

## 2019-10-07 ENCOUNTER — TELEPHONE (OUTPATIENT)
Dept: CARDIOLOGY CLINIC | Age: 24
End: 2019-10-07

## 2019-10-08 ENCOUNTER — ROUTINE PRENATAL (OUTPATIENT)
Dept: OBGYN CLINIC | Age: 24
End: 2019-10-08
Payer: MEDICAID

## 2019-10-08 VITALS
BODY MASS INDEX: 24.98 KG/M2 | DIASTOLIC BLOOD PRESSURE: 58 MMHG | WEIGHT: 141 LBS | HEART RATE: 88 BPM | SYSTOLIC BLOOD PRESSURE: 102 MMHG

## 2019-10-08 DIAGNOSIS — Z34.83 ENCOUNTER FOR SUPERVISION OF OTHER NORMAL PREGNANCY IN THIRD TRIMESTER: Primary | ICD-10-CM

## 2019-10-08 DIAGNOSIS — Z3A.34 34 WEEKS GESTATION OF PREGNANCY: ICD-10-CM

## 2019-10-08 PROCEDURE — G8427 DOCREV CUR MEDS BY ELIG CLIN: HCPCS | Performed by: OBSTETRICS & GYNECOLOGY

## 2019-10-08 PROCEDURE — G8420 CALC BMI NORM PARAMETERS: HCPCS | Performed by: OBSTETRICS & GYNECOLOGY

## 2019-10-08 PROCEDURE — 99213 OFFICE O/P EST LOW 20 MIN: CPT | Performed by: OBSTETRICS & GYNECOLOGY

## 2019-10-08 PROCEDURE — G8484 FLU IMMUNIZE NO ADMIN: HCPCS | Performed by: OBSTETRICS & GYNECOLOGY

## 2019-10-08 PROCEDURE — 1036F TOBACCO NON-USER: CPT | Performed by: OBSTETRICS & GYNECOLOGY

## 2019-10-09 ENCOUNTER — OFFICE VISIT (OUTPATIENT)
Dept: CARDIOLOGY CLINIC | Age: 24
End: 2019-10-09
Payer: MEDICAID

## 2019-10-09 VITALS
HEIGHT: 63 IN | WEIGHT: 142.6 LBS | OXYGEN SATURATION: 98 % | SYSTOLIC BLOOD PRESSURE: 108 MMHG | BODY MASS INDEX: 25.27 KG/M2 | DIASTOLIC BLOOD PRESSURE: 60 MMHG | HEART RATE: 96 BPM

## 2019-10-09 DIAGNOSIS — R00.0 TACHYCARDIA: Primary | ICD-10-CM

## 2019-10-09 PROCEDURE — G8427 DOCREV CUR MEDS BY ELIG CLIN: HCPCS | Performed by: INTERNAL MEDICINE

## 2019-10-09 PROCEDURE — G8419 CALC BMI OUT NRM PARAM NOF/U: HCPCS | Performed by: INTERNAL MEDICINE

## 2019-10-09 PROCEDURE — G8484 FLU IMMUNIZE NO ADMIN: HCPCS | Performed by: INTERNAL MEDICINE

## 2019-10-09 PROCEDURE — 1036F TOBACCO NON-USER: CPT | Performed by: INTERNAL MEDICINE

## 2019-10-09 PROCEDURE — 99213 OFFICE O/P EST LOW 20 MIN: CPT | Performed by: INTERNAL MEDICINE

## 2019-10-24 ENCOUNTER — ROUTINE PRENATAL (OUTPATIENT)
Dept: OBGYN CLINIC | Age: 24
End: 2019-10-24
Payer: MEDICAID

## 2019-10-24 VITALS
DIASTOLIC BLOOD PRESSURE: 76 MMHG | SYSTOLIC BLOOD PRESSURE: 98 MMHG | BODY MASS INDEX: 26.31 KG/M2 | WEIGHT: 148.5 LBS | HEART RATE: 86 BPM

## 2019-10-24 DIAGNOSIS — Z3A.36 36 WEEKS GESTATION OF PREGNANCY: ICD-10-CM

## 2019-10-24 DIAGNOSIS — Z34.83 ENCOUNTER FOR SUPERVISION OF OTHER NORMAL PREGNANCY IN THIRD TRIMESTER: ICD-10-CM

## 2019-10-24 DIAGNOSIS — Z34.83 ENCOUNTER FOR SUPERVISION OF OTHER NORMAL PREGNANCY IN THIRD TRIMESTER: Primary | ICD-10-CM

## 2019-10-24 PROCEDURE — G8427 DOCREV CUR MEDS BY ELIG CLIN: HCPCS | Performed by: OBSTETRICS & GYNECOLOGY

## 2019-10-24 PROCEDURE — G8419 CALC BMI OUT NRM PARAM NOF/U: HCPCS | Performed by: OBSTETRICS & GYNECOLOGY

## 2019-10-24 PROCEDURE — 1036F TOBACCO NON-USER: CPT | Performed by: OBSTETRICS & GYNECOLOGY

## 2019-10-24 PROCEDURE — G8484 FLU IMMUNIZE NO ADMIN: HCPCS | Performed by: OBSTETRICS & GYNECOLOGY

## 2019-10-24 PROCEDURE — 99213 OFFICE O/P EST LOW 20 MIN: CPT | Performed by: OBSTETRICS & GYNECOLOGY

## 2019-10-26 LAB — URINE CULTURE, ROUTINE: NORMAL

## 2019-10-27 LAB — GROUP B STREP CULTURE: NORMAL

## 2019-10-31 ENCOUNTER — ROUTINE PRENATAL (OUTPATIENT)
Dept: OBGYN CLINIC | Age: 24
End: 2019-10-31
Payer: MEDICAID

## 2019-10-31 VITALS
SYSTOLIC BLOOD PRESSURE: 104 MMHG | HEART RATE: 84 BPM | BODY MASS INDEX: 26.04 KG/M2 | WEIGHT: 147 LBS | DIASTOLIC BLOOD PRESSURE: 66 MMHG

## 2019-10-31 DIAGNOSIS — Z3A.37 37 WEEKS GESTATION OF PREGNANCY: ICD-10-CM

## 2019-10-31 DIAGNOSIS — Z34.83 ENCOUNTER FOR SUPERVISION OF OTHER NORMAL PREGNANCY IN THIRD TRIMESTER: Primary | ICD-10-CM

## 2019-10-31 PROCEDURE — G8484 FLU IMMUNIZE NO ADMIN: HCPCS | Performed by: OBSTETRICS & GYNECOLOGY

## 2019-10-31 PROCEDURE — 99213 OFFICE O/P EST LOW 20 MIN: CPT | Performed by: OBSTETRICS & GYNECOLOGY

## 2019-10-31 PROCEDURE — G8419 CALC BMI OUT NRM PARAM NOF/U: HCPCS | Performed by: OBSTETRICS & GYNECOLOGY

## 2019-10-31 PROCEDURE — 1036F TOBACCO NON-USER: CPT | Performed by: OBSTETRICS & GYNECOLOGY

## 2019-10-31 PROCEDURE — G8427 DOCREV CUR MEDS BY ELIG CLIN: HCPCS | Performed by: OBSTETRICS & GYNECOLOGY

## 2019-11-01 ENCOUNTER — HOSPITAL ENCOUNTER (OUTPATIENT)
Age: 24
Discharge: HOME | End: 2019-11-01
Attending: OBSTETRICS & GYNECOLOGY | Admitting: OBSTETRICS & GYNECOLOGY
Payer: MEDICAID

## 2019-11-01 VITALS
HEART RATE: 98 BPM | RESPIRATION RATE: 16 BRPM | DIASTOLIC BLOOD PRESSURE: 73 MMHG | BODY MASS INDEX: 26.04 KG/M2 | SYSTOLIC BLOOD PRESSURE: 105 MMHG | TEMPERATURE: 98.3 F | HEIGHT: 63 IN

## 2019-11-01 PROBLEM — H65.93 FLUID LEVEL BEHIND TYMPANIC MEMBRANE OF BOTH EARS: Status: RESOLVED | Noted: 2019-04-05 | Resolved: 2019-11-01

## 2019-11-01 PROBLEM — R09.82 POST-NASAL DRIP: Status: RESOLVED | Noted: 2019-04-05 | Resolved: 2019-11-01

## 2019-11-01 PROCEDURE — 99283 EMERGENCY DEPT VISIT LOW MDM: CPT

## 2019-11-07 ENCOUNTER — ROUTINE PRENATAL (OUTPATIENT)
Dept: OBGYN CLINIC | Age: 24
End: 2019-11-07
Payer: MEDICAID

## 2019-11-07 VITALS
SYSTOLIC BLOOD PRESSURE: 92 MMHG | BODY MASS INDEX: 25.86 KG/M2 | DIASTOLIC BLOOD PRESSURE: 68 MMHG | WEIGHT: 146 LBS | HEART RATE: 80 BPM

## 2019-11-07 DIAGNOSIS — Z34.83 ENCOUNTER FOR SUPERVISION OF OTHER NORMAL PREGNANCY IN THIRD TRIMESTER: Primary | ICD-10-CM

## 2019-11-07 DIAGNOSIS — Z3A.38 38 WEEKS GESTATION OF PREGNANCY: ICD-10-CM

## 2019-11-07 PROCEDURE — G8484 FLU IMMUNIZE NO ADMIN: HCPCS | Performed by: OBSTETRICS & GYNECOLOGY

## 2019-11-07 PROCEDURE — G8427 DOCREV CUR MEDS BY ELIG CLIN: HCPCS | Performed by: OBSTETRICS & GYNECOLOGY

## 2019-11-07 PROCEDURE — 1036F TOBACCO NON-USER: CPT | Performed by: OBSTETRICS & GYNECOLOGY

## 2019-11-07 PROCEDURE — 99213 OFFICE O/P EST LOW 20 MIN: CPT | Performed by: OBSTETRICS & GYNECOLOGY

## 2019-11-07 PROCEDURE — G8419 CALC BMI OUT NRM PARAM NOF/U: HCPCS | Performed by: OBSTETRICS & GYNECOLOGY

## 2019-11-10 ENCOUNTER — HOSPITAL ENCOUNTER (OUTPATIENT)
Age: 24
Discharge: HOME | End: 2019-11-10
Attending: OBSTETRICS & GYNECOLOGY | Admitting: OBSTETRICS & GYNECOLOGY
Payer: MEDICAID

## 2019-11-10 VITALS
RESPIRATION RATE: 18 BRPM | HEART RATE: 102 BPM | DIASTOLIC BLOOD PRESSURE: 73 MMHG | BODY MASS INDEX: 25.87 KG/M2 | OXYGEN SATURATION: 97 % | WEIGHT: 146 LBS | SYSTOLIC BLOOD PRESSURE: 111 MMHG | TEMPERATURE: 98.2 F | HEIGHT: 63 IN

## 2019-11-10 LAB
AMPHETAMINE SCREEN, URINE: NORMAL
BACTERIA: NEGATIVE /HPF
BARBITURATE SCREEN URINE: NORMAL
BENZODIAZEPINE SCREEN, URINE: NORMAL
BILIRUBIN URINE: NEGATIVE
BLOOD, URINE: ABNORMAL
CANNABINOID SCREEN URINE: NORMAL
CLARITY: ABNORMAL
COCAINE METABOLITE SCREEN URINE: NORMAL
COLOR: YELLOW
EPITHELIAL CELLS, UA: ABNORMAL /HPF (ref 0–5)
GLUCOSE URINE: NEGATIVE MG/DL
HYALINE CASTS: ABNORMAL /HPF (ref 0–5)
KETONES, URINE: NEGATIVE MG/DL
LEUKOCYTE ESTERASE, URINE: ABNORMAL
Lab: NORMAL
METHADONE SCREEN, URINE: NORMAL
NITRITE, URINE: NEGATIVE
OPIATE SCREEN URINE: NORMAL
OXYCODONE URINE: NORMAL
PH UA: 6.5 (ref 5–9)
PHENCYCLIDINE SCREEN URINE: NORMAL
PROPOXYPHENE SCREEN: NORMAL
PROTEIN UA: NEGATIVE MG/DL
RBC UA: >100 /HPF (ref 0–5)
SPECIFIC GRAVITY UA: 1.02 (ref 1–1.03)
UROBILINOGEN, URINE: 0.2 E.U./DL
WBC UA: ABNORMAL /HPF (ref 0–5)

## 2019-11-10 PROCEDURE — 81001 URINALYSIS AUTO W/SCOPE: CPT

## 2019-11-10 PROCEDURE — 99283 EMERGENCY DEPT VISIT LOW MDM: CPT

## 2019-11-10 PROCEDURE — 80307 DRUG TEST PRSMV CHEM ANLYZR: CPT

## 2019-11-14 ENCOUNTER — ANESTHESIA (OUTPATIENT)
Dept: LABOR AND DELIVERY | Age: 24
DRG: 560 | End: 2019-11-14
Payer: MEDICAID

## 2019-11-14 ENCOUNTER — ROUTINE PRENATAL (OUTPATIENT)
Dept: OBGYN CLINIC | Age: 24
End: 2019-11-14
Payer: MEDICAID

## 2019-11-14 ENCOUNTER — HOSPITAL ENCOUNTER (INPATIENT)
Age: 24
LOS: 3 days | Discharge: HOME | DRG: 560 | End: 2019-11-17
Attending: OBSTETRICS & GYNECOLOGY | Admitting: OBSTETRICS & GYNECOLOGY
Payer: MEDICAID

## 2019-11-14 ENCOUNTER — PREP FOR PROCEDURE (OUTPATIENT)
Dept: OBGYN | Age: 24
End: 2019-11-14

## 2019-11-14 ENCOUNTER — ANESTHESIA EVENT (OUTPATIENT)
Dept: LABOR AND DELIVERY | Age: 24
DRG: 560 | End: 2019-11-14
Payer: MEDICAID

## 2019-11-14 VITALS
SYSTOLIC BLOOD PRESSURE: 104 MMHG | BODY MASS INDEX: 25.86 KG/M2 | WEIGHT: 146 LBS | DIASTOLIC BLOOD PRESSURE: 76 MMHG | HEART RATE: 79 BPM

## 2019-11-14 DIAGNOSIS — Z34.90 TERM PREGNANCY: ICD-10-CM

## 2019-11-14 DIAGNOSIS — Z3A.39 39 WEEKS GESTATION OF PREGNANCY: ICD-10-CM

## 2019-11-14 DIAGNOSIS — Z34.83 ENCOUNTER FOR SUPERVISION OF OTHER NORMAL PREGNANCY IN THIRD TRIMESTER: Primary | ICD-10-CM

## 2019-11-14 LAB
ABO/RH: NORMAL
ALBUMIN SERPL-MCNC: 3.8 G/DL (ref 3.5–4.6)
ALP BLD-CCNC: 117 U/L (ref 40–130)
ALT SERPL-CCNC: 7 U/L (ref 0–33)
AMPHETAMINE SCREEN, URINE: NORMAL
ANION GAP SERPL CALCULATED.3IONS-SCNC: 16 MEQ/L (ref 9–15)
ANTIBODY SCREEN: NORMAL
AST SERPL-CCNC: 18 U/L (ref 0–35)
BACTERIA: NEGATIVE /HPF
BARBITURATE SCREEN URINE: NORMAL
BASOPHILS ABSOLUTE: 0 K/UL (ref 0–0.2)
BASOPHILS RELATIVE PERCENT: 0.2 %
BENZODIAZEPINE SCREEN, URINE: NORMAL
BILIRUB SERPL-MCNC: <0.2 MG/DL (ref 0.2–0.7)
BILIRUBIN URINE: NEGATIVE
BLOOD, URINE: ABNORMAL
BUN BLDV-MCNC: 25 MG/DL (ref 6–20)
CALCIUM SERPL-MCNC: 9.6 MG/DL (ref 8.5–9.9)
CANNABINOID SCREEN URINE: NORMAL
CHLORIDE BLD-SCNC: 101 MEQ/L (ref 95–107)
CLARITY: CLEAR
CO2: 21 MEQ/L (ref 20–31)
COCAINE METABOLITE SCREEN URINE: NORMAL
COLOR: YELLOW
CREAT SERPL-MCNC: 0.68 MG/DL (ref 0.5–0.9)
EOSINOPHILS ABSOLUTE: 0 K/UL (ref 0–0.7)
EOSINOPHILS RELATIVE PERCENT: 0.8 %
EPITHELIAL CELLS, UA: ABNORMAL /HPF (ref 0–5)
GFR AFRICAN AMERICAN: >60
GFR NON-AFRICAN AMERICAN: >60
GLOBULIN: 2.6 G/DL (ref 2.3–3.5)
GLUCOSE BLD-MCNC: 93 MG/DL (ref 70–99)
GLUCOSE URINE: NEGATIVE MG/DL
HCT VFR BLD CALC: 35.2 % (ref 37–47)
HEMOGLOBIN: 11.8 G/DL (ref 12–16)
HEPATITIS B SURFACE ANTIGEN INTERPRETATION: NORMAL
HYALINE CASTS: ABNORMAL /HPF (ref 0–5)
KETONES, URINE: NEGATIVE MG/DL
LEUKOCYTE ESTERASE, URINE: NEGATIVE
LYMPHOCYTES ABSOLUTE: 1.8 K/UL (ref 1–4.8)
LYMPHOCYTES RELATIVE PERCENT: 29.9 %
Lab: NORMAL
MCH RBC QN AUTO: 32.8 PG (ref 27–31.3)
MCHC RBC AUTO-ENTMCNC: 33.6 % (ref 33–37)
MCV RBC AUTO: 97.8 FL (ref 82–100)
METHADONE SCREEN, URINE: NORMAL
MONOCYTES ABSOLUTE: 0.4 K/UL (ref 0.2–0.8)
MONOCYTES RELATIVE PERCENT: 7.5 %
NEUTROPHILS ABSOLUTE: 3.7 K/UL (ref 1.4–6.5)
NEUTROPHILS RELATIVE PERCENT: 61.6 %
NITRITE, URINE: NEGATIVE
OPIATE SCREEN URINE: NORMAL
PDW BLD-RTO: 12.8 % (ref 11.5–14.5)
PH UA: 6 (ref 5–9)
PHENCYCLIDINE SCREEN URINE: NORMAL
PLATELET # BLD: 195 K/UL (ref 130–400)
POTASSIUM SERPL-SCNC: 4 MEQ/L (ref 3.4–4.9)
PROPOXYPHENE SCREEN, URINE: NORMAL
PROTEIN UA: NEGATIVE MG/DL
RBC # BLD: 3.6 M/UL (ref 4.2–5.4)
RBC UA: ABNORMAL /HPF (ref 0–5)
SODIUM BLD-SCNC: 138 MEQ/L (ref 135–144)
SPECIFIC GRAVITY UA: 1.02 (ref 1–1.03)
TOTAL PROTEIN: 6.4 G/DL (ref 6.3–8)
UR OXYCODONE RAPID SCREEN: NORMAL
UROBILINOGEN, URINE: 0.2 E.U./DL
WBC # BLD: 6 K/UL (ref 4.8–10.8)
WBC UA: ABNORMAL /HPF (ref 0–5)

## 2019-11-14 PROCEDURE — 86901 BLOOD TYPING SEROLOGIC RH(D): CPT

## 2019-11-14 PROCEDURE — G8419 CALC BMI OUT NRM PARAM NOF/U: HCPCS | Performed by: OBSTETRICS & GYNECOLOGY

## 2019-11-14 PROCEDURE — 86762 RUBELLA ANTIBODY: CPT

## 2019-11-14 PROCEDURE — 87340 HEPATITIS B SURFACE AG IA: CPT

## 2019-11-14 PROCEDURE — 81001 URINALYSIS AUTO W/SCOPE: CPT

## 2019-11-14 PROCEDURE — 1036F TOBACCO NON-USER: CPT | Performed by: OBSTETRICS & GYNECOLOGY

## 2019-11-14 PROCEDURE — G8427 DOCREV CUR MEDS BY ELIG CLIN: HCPCS | Performed by: OBSTETRICS & GYNECOLOGY

## 2019-11-14 PROCEDURE — 1220000000 HC SEMI PRIVATE OB R&B

## 2019-11-14 PROCEDURE — 36415 COLL VENOUS BLD VENIPUNCTURE: CPT

## 2019-11-14 PROCEDURE — 99213 OFFICE O/P EST LOW 20 MIN: CPT | Performed by: OBSTETRICS & GYNECOLOGY

## 2019-11-14 PROCEDURE — 6370000000 HC RX 637 (ALT 250 FOR IP): Performed by: OBSTETRICS & GYNECOLOGY

## 2019-11-14 PROCEDURE — 80053 COMPREHEN METABOLIC PANEL: CPT

## 2019-11-14 PROCEDURE — 2580000003 HC RX 258: Performed by: OBSTETRICS & GYNECOLOGY

## 2019-11-14 PROCEDURE — 86850 RBC ANTIBODY SCREEN: CPT

## 2019-11-14 PROCEDURE — 86900 BLOOD TYPING SEROLOGIC ABO: CPT

## 2019-11-14 PROCEDURE — G8484 FLU IMMUNIZE NO ADMIN: HCPCS | Performed by: OBSTETRICS & GYNECOLOGY

## 2019-11-14 PROCEDURE — 85025 COMPLETE CBC W/AUTO DIFF WBC: CPT

## 2019-11-14 PROCEDURE — 80306 DRUG TEST PRSMV INSTRMNT: CPT

## 2019-11-14 RX ORDER — NALBUPHINE HCL 10 MG/ML
10 AMPUL (ML) INJECTION
Status: DISCONTINUED | OUTPATIENT
Start: 2019-11-14 | End: 2019-11-15

## 2019-11-14 RX ORDER — ACETAMINOPHEN 325 MG/1
650 TABLET ORAL EVERY 4 HOURS PRN
Status: DISCONTINUED | OUTPATIENT
Start: 2019-11-14 | End: 2019-11-15

## 2019-11-14 RX ORDER — SODIUM CHLORIDE 0.9 % (FLUSH) 0.9 %
10 SYRINGE (ML) INJECTION PRN
Status: CANCELLED | OUTPATIENT
Start: 2019-11-14

## 2019-11-14 RX ORDER — DOCUSATE SODIUM 100 MG/1
100 CAPSULE, LIQUID FILLED ORAL 2 TIMES DAILY PRN
Status: CANCELLED | OUTPATIENT
Start: 2019-11-14

## 2019-11-14 RX ORDER — ONDANSETRON 2 MG/ML
4 INJECTION INTRAMUSCULAR; INTRAVENOUS EVERY 6 HOURS PRN
Status: CANCELLED | OUTPATIENT
Start: 2019-11-14

## 2019-11-14 RX ORDER — DIPHENHYDRAMINE HCL 25 MG
25 TABLET ORAL EVERY 4 HOURS PRN
Status: DISCONTINUED | OUTPATIENT
Start: 2019-11-14 | End: 2019-11-15

## 2019-11-14 RX ORDER — SODIUM CHLORIDE, SODIUM LACTATE, POTASSIUM CHLORIDE, CALCIUM CHLORIDE 600; 310; 30; 20 MG/100ML; MG/100ML; MG/100ML; MG/100ML
INJECTION, SOLUTION INTRAVENOUS CONTINUOUS
Status: DISCONTINUED | OUTPATIENT
Start: 2019-11-14 | End: 2019-11-15

## 2019-11-14 RX ORDER — SODIUM CHLORIDE 0.9 % (FLUSH) 0.9 %
10 SYRINGE (ML) INJECTION PRN
Status: DISCONTINUED | OUTPATIENT
Start: 2019-11-14 | End: 2019-11-15

## 2019-11-14 RX ORDER — NALBUPHINE HCL 10 MG/ML
10 AMPUL (ML) INJECTION
Status: CANCELLED | OUTPATIENT
Start: 2019-11-14 | End: 2019-11-16

## 2019-11-14 RX ORDER — SODIUM CHLORIDE, SODIUM LACTATE, POTASSIUM CHLORIDE, CALCIUM CHLORIDE 600; 310; 30; 20 MG/100ML; MG/100ML; MG/100ML; MG/100ML
INJECTION, SOLUTION INTRAVENOUS CONTINUOUS
Status: CANCELLED | OUTPATIENT
Start: 2019-11-14

## 2019-11-14 RX ORDER — SODIUM CHLORIDE 0.9 % (FLUSH) 0.9 %
10 SYRINGE (ML) INJECTION EVERY 12 HOURS SCHEDULED
Status: CANCELLED | OUTPATIENT
Start: 2019-11-14

## 2019-11-14 RX ORDER — SODIUM CHLORIDE 0.9 % (FLUSH) 0.9 %
10 SYRINGE (ML) INJECTION EVERY 12 HOURS SCHEDULED
Status: DISCONTINUED | OUTPATIENT
Start: 2019-11-14 | End: 2019-11-15

## 2019-11-14 RX ORDER — DIPHENHYDRAMINE HCL 25 MG
25 TABLET ORAL EVERY 4 HOURS PRN
Status: CANCELLED | OUTPATIENT
Start: 2019-11-14

## 2019-11-14 RX ORDER — ONDANSETRON 2 MG/ML
4 INJECTION INTRAMUSCULAR; INTRAVENOUS EVERY 6 HOURS PRN
Status: DISCONTINUED | OUTPATIENT
Start: 2019-11-14 | End: 2019-11-15

## 2019-11-14 RX ORDER — ACETAMINOPHEN 325 MG/1
650 TABLET ORAL EVERY 4 HOURS PRN
Status: CANCELLED | OUTPATIENT
Start: 2019-11-14

## 2019-11-14 RX ORDER — DOCUSATE SODIUM 100 MG/1
100 CAPSULE, LIQUID FILLED ORAL 2 TIMES DAILY PRN
Status: DISCONTINUED | OUTPATIENT
Start: 2019-11-14 | End: 2019-11-15

## 2019-11-14 RX ADMIN — MISOPROSTOL 25 MCG: 100 TABLET ORAL at 22:54

## 2019-11-14 RX ADMIN — SODIUM CHLORIDE, POTASSIUM CHLORIDE, SODIUM LACTATE AND CALCIUM CHLORIDE: 600; 310; 30; 20 INJECTION, SOLUTION INTRAVENOUS at 22:54

## 2019-11-15 LAB — RUBELLA ANTIBODY IGG: 10.5 IU/ML

## 2019-11-15 PROCEDURE — 6370000000 HC RX 637 (ALT 250 FOR IP): Performed by: OBSTETRICS & GYNECOLOGY

## 2019-11-15 PROCEDURE — 3E033VJ INTRODUCTION OF OTHER HORMONE INTO PERIPHERAL VEIN, PERCUTANEOUS APPROACH: ICD-10-PCS | Performed by: OBSTETRICS & GYNECOLOGY

## 2019-11-15 PROCEDURE — 2500000003 HC RX 250 WO HCPCS: Performed by: NURSE ANESTHETIST, CERTIFIED REGISTERED

## 2019-11-15 PROCEDURE — 2500000003 HC RX 250 WO HCPCS: Performed by: OBSTETRICS & GYNECOLOGY

## 2019-11-15 PROCEDURE — 0HQ9XZZ REPAIR PERINEUM SKIN, EXTERNAL APPROACH: ICD-10-PCS | Performed by: OBSTETRICS & GYNECOLOGY

## 2019-11-15 PROCEDURE — 1220000000 HC SEMI PRIVATE OB R&B

## 2019-11-15 PROCEDURE — 3700000025 EPIDURAL BLOCK: Performed by: NURSE ANESTHETIST, CERTIFIED REGISTERED

## 2019-11-15 PROCEDURE — 2580000003 HC RX 258: Performed by: OBSTETRICS & GYNECOLOGY

## 2019-11-15 PROCEDURE — 6360000002 HC RX W HCPCS: Performed by: OBSTETRICS & GYNECOLOGY

## 2019-11-15 PROCEDURE — 59409 OBSTETRICAL CARE: CPT | Performed by: OBSTETRICS & GYNECOLOGY

## 2019-11-15 PROCEDURE — 6360000002 HC RX W HCPCS: Performed by: NURSE ANESTHETIST, CERTIFIED REGISTERED

## 2019-11-15 RX ORDER — HYDROCODONE BITARTRATE AND ACETAMINOPHEN 5; 325 MG/1; MG/1
1 TABLET ORAL EVERY 4 HOURS PRN
Status: DISCONTINUED | OUTPATIENT
Start: 2019-11-15 | End: 2019-11-17 | Stop reason: HOSPADM

## 2019-11-15 RX ORDER — ONDANSETRON 2 MG/ML
4 INJECTION INTRAMUSCULAR; INTRAVENOUS EVERY 6 HOURS PRN
Status: DISCONTINUED | OUTPATIENT
Start: 2019-11-15 | End: 2019-11-17 | Stop reason: HOSPADM

## 2019-11-15 RX ORDER — IBUPROFEN 600 MG/1
600 TABLET ORAL EVERY 6 HOURS PRN
Qty: 120 TABLET | Refills: 3 | Status: SHIPPED | OUTPATIENT
Start: 2019-11-15 | End: 2020-04-09

## 2019-11-15 RX ORDER — SODIUM CHLORIDE 0.9 % (FLUSH) 0.9 %
10 SYRINGE (ML) INJECTION PRN
Status: DISCONTINUED | OUTPATIENT
Start: 2019-11-15 | End: 2019-11-17 | Stop reason: HOSPADM

## 2019-11-15 RX ORDER — ACETAMINOPHEN 325 MG/1
650 TABLET ORAL EVERY 4 HOURS PRN
Status: DISCONTINUED | OUTPATIENT
Start: 2019-11-15 | End: 2019-11-17 | Stop reason: HOSPADM

## 2019-11-15 RX ORDER — NALOXONE HYDROCHLORIDE 0.4 MG/ML
0.4 INJECTION, SOLUTION INTRAMUSCULAR; INTRAVENOUS; SUBCUTANEOUS PRN
Status: DISCONTINUED | OUTPATIENT
Start: 2019-11-15 | End: 2019-11-15

## 2019-11-15 RX ORDER — DOCUSATE SODIUM 100 MG/1
100 CAPSULE, LIQUID FILLED ORAL DAILY
Status: DISCONTINUED | OUTPATIENT
Start: 2019-11-15 | End: 2019-11-17 | Stop reason: HOSPADM

## 2019-11-15 RX ORDER — SODIUM CHLORIDE 0.9 % (FLUSH) 0.9 %
10 SYRINGE (ML) INJECTION EVERY 12 HOURS SCHEDULED
Status: DISCONTINUED | OUTPATIENT
Start: 2019-11-15 | End: 2019-11-17 | Stop reason: HOSPADM

## 2019-11-15 RX ORDER — FENTANYL CITRATE 50 UG/ML
INJECTION, SOLUTION INTRAMUSCULAR; INTRAVENOUS PRN
Status: DISCONTINUED | OUTPATIENT
Start: 2019-11-15 | End: 2019-11-15 | Stop reason: SDUPTHER

## 2019-11-15 RX ORDER — NALBUPHINE HCL 10 MG/ML
5 AMPUL (ML) INJECTION EVERY 4 HOURS PRN
Status: DISCONTINUED | OUTPATIENT
Start: 2019-11-15 | End: 2019-11-15

## 2019-11-15 RX ORDER — LANOLIN 100 %
OINTMENT (GRAM) TOPICAL PRN
Status: DISCONTINUED | OUTPATIENT
Start: 2019-11-15 | End: 2019-11-17 | Stop reason: HOSPADM

## 2019-11-15 RX ORDER — ONDANSETRON 2 MG/ML
4 INJECTION INTRAMUSCULAR; INTRAVENOUS EVERY 6 HOURS PRN
Status: DISCONTINUED | OUTPATIENT
Start: 2019-11-15 | End: 2019-11-15

## 2019-11-15 RX ORDER — IBUPROFEN 600 MG/1
600 TABLET ORAL EVERY 6 HOURS PRN
Status: DISCONTINUED | OUTPATIENT
Start: 2019-11-15 | End: 2019-11-17 | Stop reason: HOSPADM

## 2019-11-15 RX ORDER — BUPIVACAINE HYDROCHLORIDE 5 MG/ML
INJECTION, SOLUTION EPIDURAL; INTRACAUDAL PRN
Status: DISCONTINUED | OUTPATIENT
Start: 2019-11-15 | End: 2019-11-15 | Stop reason: SDUPTHER

## 2019-11-15 RX ORDER — HYDROCODONE BITARTRATE AND ACETAMINOPHEN 5; 325 MG/1; MG/1
2 TABLET ORAL EVERY 4 HOURS PRN
Status: DISCONTINUED | OUTPATIENT
Start: 2019-11-15 | End: 2019-11-17 | Stop reason: HOSPADM

## 2019-11-15 RX ORDER — LIDOCAINE HYDROCHLORIDE AND EPINEPHRINE 15; 5 MG/ML; UG/ML
INJECTION, SOLUTION EPIDURAL PRN
Status: DISCONTINUED | OUTPATIENT
Start: 2019-11-15 | End: 2019-11-15 | Stop reason: SDUPTHER

## 2019-11-15 RX ADMIN — FENTANYL CITRATE 100 MCG: 0.05 INJECTION, SOLUTION INTRAMUSCULAR; INTRAVENOUS at 14:17

## 2019-11-15 RX ADMIN — NALBUPHINE HYDROCHLORIDE 5 MG: 10 INJECTION, SOLUTION INTRAMUSCULAR; INTRAVENOUS; SUBCUTANEOUS at 07:59

## 2019-11-15 RX ADMIN — Medication 1 MILLI-UNITS/MIN: at 08:47

## 2019-11-15 RX ADMIN — BUPIVACAINE HYDROCHLORIDE 3 ML: 5 INJECTION, SOLUTION EPIDURAL; INTRACAUDAL; PERINEURAL at 14:17

## 2019-11-15 RX ADMIN — BENZOCAINE AND LEVOMENTHOL: 200; 5 SPRAY TOPICAL at 20:54

## 2019-11-15 RX ADMIN — LIDOCAINE HYDROCHLORIDE AND EPINEPHRINE 3 ML: 15; 5 INJECTION, SOLUTION EPIDURAL at 14:17

## 2019-11-15 RX ADMIN — MISOPROSTOL 25 MCG: 100 TABLET ORAL at 03:59

## 2019-11-15 RX ADMIN — SODIUM CHLORIDE, POTASSIUM CHLORIDE, SODIUM LACTATE AND CALCIUM CHLORIDE: 600; 310; 30; 20 INJECTION, SOLUTION INTRAVENOUS at 11:27

## 2019-11-15 RX ADMIN — Medication 10 ML/HR: at 13:30

## 2019-11-15 RX ADMIN — IBUPROFEN 600 MG: 600 TABLET ORAL at 20:38

## 2019-11-15 RX ADMIN — SODIUM CHLORIDE, POTASSIUM CHLORIDE, SODIUM LACTATE AND CALCIUM CHLORIDE: 600; 310; 30; 20 INJECTION, SOLUTION INTRAVENOUS at 10:25

## 2019-11-15 RX ADMIN — SODIUM CHLORIDE, POTASSIUM CHLORIDE, SODIUM LACTATE AND CALCIUM CHLORIDE: 600; 310; 30; 20 INJECTION, SOLUTION INTRAVENOUS at 06:47

## 2019-11-15 RX ADMIN — NALBUPHINE HYDROCHLORIDE 10 MG: 10 INJECTION, SOLUTION INTRAMUSCULAR; INTRAVENOUS; SUBCUTANEOUS at 11:04

## 2019-11-15 ASSESSMENT — PAIN SCALES - GENERAL
PAINLEVEL_OUTOF10: 5
PAINLEVEL_OUTOF10: 7
PAINLEVEL_OUTOF10: 6
PAINLEVEL_OUTOF10: 5

## 2019-11-15 ASSESSMENT — PAIN DESCRIPTION - DESCRIPTORS: DESCRIPTORS: CRAMPING

## 2019-11-15 ASSESSMENT — PAIN DESCRIPTION - PAIN TYPE: TYPE: ACUTE PAIN

## 2019-11-15 ASSESSMENT — PAIN DESCRIPTION - LOCATION: LOCATION: ABDOMEN

## 2019-11-16 LAB
HCT VFR BLD CALC: 28.9 % (ref 37–47)
HEMOGLOBIN: 9.9 G/DL (ref 12–16)
MCH RBC QN AUTO: 33.8 PG (ref 27–31.3)
MCHC RBC AUTO-ENTMCNC: 34.3 % (ref 33–37)
MCV RBC AUTO: 98.6 FL (ref 82–100)
PDW BLD-RTO: 13.2 % (ref 11.5–14.5)
PLATELET # BLD: 137 K/UL (ref 130–400)
RBC # BLD: 2.93 M/UL (ref 4.2–5.4)
WBC # BLD: 8.7 K/UL (ref 4.8–10.8)

## 2019-11-16 PROCEDURE — 99233 SBSQ HOSP IP/OBS HIGH 50: CPT | Performed by: OBSTETRICS & GYNECOLOGY

## 2019-11-16 PROCEDURE — 6370000000 HC RX 637 (ALT 250 FOR IP): Performed by: OBSTETRICS & GYNECOLOGY

## 2019-11-16 PROCEDURE — 36415 COLL VENOUS BLD VENIPUNCTURE: CPT

## 2019-11-16 PROCEDURE — 1220000000 HC SEMI PRIVATE OB R&B

## 2019-11-16 PROCEDURE — 85027 COMPLETE CBC AUTOMATED: CPT

## 2019-11-16 PROCEDURE — S9443 LACTATION CLASS: HCPCS

## 2019-11-16 RX ORDER — FERROUS SULFATE 325(65) MG
325 TABLET ORAL 2 TIMES DAILY WITH MEALS
Status: DISCONTINUED | OUTPATIENT
Start: 2019-11-16 | End: 2019-11-17 | Stop reason: HOSPADM

## 2019-11-16 RX ADMIN — FERROUS SULFATE TAB 325 MG (65 MG ELEMENTAL FE) 325 MG: 325 (65 FE) TAB at 12:59

## 2019-11-16 RX ADMIN — IBUPROFEN 600 MG: 600 TABLET ORAL at 21:06

## 2019-11-16 RX ADMIN — IBUPROFEN 600 MG: 600 TABLET ORAL at 12:59

## 2019-11-16 RX ADMIN — IBUPROFEN 600 MG: 600 TABLET ORAL at 04:53

## 2019-11-16 RX ADMIN — DOCUSATE SODIUM 100 MG: 100 CAPSULE, LIQUID FILLED ORAL at 08:17

## 2019-11-16 ASSESSMENT — PAIN SCALES - GENERAL
PAINLEVEL_OUTOF10: 5
PAINLEVEL_OUTOF10: 4
PAINLEVEL_OUTOF10: 5

## 2019-11-17 VITALS
DIASTOLIC BLOOD PRESSURE: 71 MMHG | OXYGEN SATURATION: 98 % | RESPIRATION RATE: 16 BRPM | SYSTOLIC BLOOD PRESSURE: 107 MMHG | HEART RATE: 70 BPM | TEMPERATURE: 98.4 F

## 2019-11-17 PROCEDURE — 7200000001 HC VAGINAL DELIVERY

## 2019-11-17 PROCEDURE — 90471 IMMUNIZATION ADMIN: CPT | Performed by: OBSTETRICS & GYNECOLOGY

## 2019-11-17 PROCEDURE — 6370000000 HC RX 637 (ALT 250 FOR IP): Performed by: OBSTETRICS & GYNECOLOGY

## 2019-11-17 PROCEDURE — 6360000002 HC RX W HCPCS: Performed by: OBSTETRICS & GYNECOLOGY

## 2019-11-17 PROCEDURE — G0008 ADMIN INFLUENZA VIRUS VAC: HCPCS | Performed by: OBSTETRICS & GYNECOLOGY

## 2019-11-17 PROCEDURE — 90686 IIV4 VACC NO PRSV 0.5 ML IM: CPT | Performed by: OBSTETRICS & GYNECOLOGY

## 2019-11-17 PROCEDURE — 90715 TDAP VACCINE 7 YRS/> IM: CPT | Performed by: OBSTETRICS & GYNECOLOGY

## 2019-11-17 RX ORDER — FERROUS SULFATE 325(65) MG
325 TABLET ORAL 2 TIMES DAILY WITH MEALS
Qty: 60 TABLET | Refills: 0 | Status: SHIPPED | OUTPATIENT
Start: 2019-11-17 | End: 2020-06-01

## 2019-11-17 RX ADMIN — FERROUS SULFATE TAB 325 MG (65 MG ELEMENTAL FE) 325 MG: 325 (65 FE) TAB at 08:31

## 2019-11-17 RX ADMIN — DOCUSATE SODIUM 100 MG: 100 CAPSULE, LIQUID FILLED ORAL at 08:31

## 2019-11-17 RX ADMIN — IBUPROFEN 600 MG: 600 TABLET ORAL at 08:31

## 2019-11-17 RX ADMIN — TETANUS TOXOID, REDUCED DIPHTHERIA TOXOID AND ACELLULAR PERTUSSIS VACCINE, ADSORBED 0.5 ML: 5; 2.5; 8; 8; 2.5 SUSPENSION INTRAMUSCULAR at 13:49

## 2019-11-17 RX ADMIN — INFLUENZA A VIRUS A/BRISBANE/02/2018 IVR-190 (H1N1) ANTIGEN (PROPIOLACTONE INACTIVATED), INFLUENZA A VIRUS A/KANSAS/14/2017 X-327 (H3N2) ANTIGEN (PROPIOLACTONE INACTIVATED), INFLUENZA B VIRUS B/MARYLAND/15/2016 ANTIGEN (PROPIOLACTONE INACTIVATED), INFLUENZA B VIRUS B/PHUKET/3073/2013 BVR-1B ANTIGEN (PROPIOLACTONE INACTIVATED) 0.5 ML: 15; 15; 15; 15 INJECTION, SUSPENSION INTRAMUSCULAR at 13:47

## 2019-11-19 ENCOUNTER — TELEPHONE (OUTPATIENT)
Dept: OBGYN CLINIC | Age: 24
End: 2019-11-19

## 2019-12-17 ENCOUNTER — OFFICE VISIT (OUTPATIENT)
Dept: FAMILY MEDICINE CLINIC | Age: 24
End: 2019-12-17
Payer: MEDICAID

## 2019-12-17 VITALS
TEMPERATURE: 97.4 F | HEART RATE: 83 BPM | RESPIRATION RATE: 12 BRPM | BODY MASS INDEX: 22.32 KG/M2 | OXYGEN SATURATION: 97 % | HEIGHT: 63 IN | DIASTOLIC BLOOD PRESSURE: 64 MMHG | WEIGHT: 126 LBS | SYSTOLIC BLOOD PRESSURE: 110 MMHG

## 2019-12-17 DIAGNOSIS — F41.9 ANXIETY: Primary | ICD-10-CM

## 2019-12-17 DIAGNOSIS — F32.A DEPRESSION, UNSPECIFIED DEPRESSION TYPE: ICD-10-CM

## 2019-12-17 PROCEDURE — 99213 OFFICE O/P EST LOW 20 MIN: CPT | Performed by: NURSE PRACTITIONER

## 2019-12-17 PROCEDURE — 1036F TOBACCO NON-USER: CPT | Performed by: NURSE PRACTITIONER

## 2019-12-17 PROCEDURE — G8482 FLU IMMUNIZE ORDER/ADMIN: HCPCS | Performed by: NURSE PRACTITIONER

## 2019-12-17 PROCEDURE — G8420 CALC BMI NORM PARAMETERS: HCPCS | Performed by: NURSE PRACTITIONER

## 2019-12-17 PROCEDURE — G8427 DOCREV CUR MEDS BY ELIG CLIN: HCPCS | Performed by: NURSE PRACTITIONER

## 2019-12-17 PROCEDURE — 1111F DSCHRG MED/CURRENT MED MERGE: CPT | Performed by: NURSE PRACTITIONER

## 2019-12-17 SDOH — ECONOMIC STABILITY: TRANSPORTATION INSECURITY
IN THE PAST 12 MONTHS, HAS LACK OF TRANSPORTATION KEPT YOU FROM MEETINGS, WORK, OR FROM GETTING THINGS NEEDED FOR DAILY LIVING?: NO

## 2019-12-17 SDOH — ECONOMIC STABILITY: TRANSPORTATION INSECURITY
IN THE PAST 12 MONTHS, HAS THE LACK OF TRANSPORTATION KEPT YOU FROM MEDICAL APPOINTMENTS OR FROM GETTING MEDICATIONS?: NO

## 2019-12-17 SDOH — ECONOMIC STABILITY: FOOD INSECURITY: WITHIN THE PAST 12 MONTHS, YOU WORRIED THAT YOUR FOOD WOULD RUN OUT BEFORE YOU GOT MONEY TO BUY MORE.: NEVER TRUE

## 2019-12-17 SDOH — ECONOMIC STABILITY: FOOD INSECURITY: WITHIN THE PAST 12 MONTHS, THE FOOD YOU BOUGHT JUST DIDN'T LAST AND YOU DIDN'T HAVE MONEY TO GET MORE.: NEVER TRUE

## 2019-12-17 ASSESSMENT — ENCOUNTER SYMPTOMS
SHORTNESS OF BREATH: 0
COUGH: 0

## 2019-12-24 ENCOUNTER — OFFICE VISIT (OUTPATIENT)
Dept: OBGYN CLINIC | Age: 24
End: 2019-12-24
Payer: MEDICAID

## 2019-12-24 VITALS
HEIGHT: 63 IN | BODY MASS INDEX: 21.79 KG/M2 | WEIGHT: 123 LBS | SYSTOLIC BLOOD PRESSURE: 102 MMHG | DIASTOLIC BLOOD PRESSURE: 70 MMHG

## 2019-12-24 DIAGNOSIS — N92.6 IRREGULAR MENSES: ICD-10-CM

## 2019-12-24 PROBLEM — Z34.90 TERM PREGNANCY: Status: RESOLVED | Noted: 2019-11-14 | Resolved: 2019-11-15

## 2019-12-24 ASSESSMENT — PATIENT HEALTH QUESTIONNAIRE - PHQ9
SUM OF ALL RESPONSES TO PHQ9 QUESTIONS 1 & 2: 0
SUM OF ALL RESPONSES TO PHQ QUESTIONS 1-9: 0
2. FEELING DOWN, DEPRESSED OR HOPELESS: 0
SUM OF ALL RESPONSES TO PHQ QUESTIONS 1-9: 0
1. LITTLE INTEREST OR PLEASURE IN DOING THINGS: 0

## 2020-01-08 ENCOUNTER — OFFICE VISIT (OUTPATIENT)
Dept: CARDIOLOGY CLINIC | Age: 25
End: 2020-01-08
Payer: MEDICAID

## 2020-01-08 VITALS — DIASTOLIC BLOOD PRESSURE: 70 MMHG | SYSTOLIC BLOOD PRESSURE: 110 MMHG | OXYGEN SATURATION: 97 % | HEART RATE: 82 BPM

## 2020-01-08 PROCEDURE — G8427 DOCREV CUR MEDS BY ELIG CLIN: HCPCS | Performed by: INTERNAL MEDICINE

## 2020-01-08 PROCEDURE — G8420 CALC BMI NORM PARAMETERS: HCPCS | Performed by: INTERNAL MEDICINE

## 2020-01-08 PROCEDURE — 99213 OFFICE O/P EST LOW 20 MIN: CPT | Performed by: INTERNAL MEDICINE

## 2020-01-08 PROCEDURE — G8482 FLU IMMUNIZE ORDER/ADMIN: HCPCS | Performed by: INTERNAL MEDICINE

## 2020-01-08 PROCEDURE — 1036F TOBACCO NON-USER: CPT | Performed by: INTERNAL MEDICINE

## 2020-01-13 NOTE — PROGRESS NOTES
[facility] CARDIOLOGY OFFICE FOLLOW-UP      Patient: Lizbet Wilson  YOB: 1995  MRN: [id number]    Chief Complaint:  Chief Complaint   Patient presents with    3 Month Follow-Up       Subjective/HPI    The patient is followed in the cardiology office chronically for the following problems: tachycardia, pregnancy    The last encounter focused on the following: followup of echo    Testing/hospitalizations/procedures performed since last encounter: patient had her baby without any tachycardia or problems    Since the last encounter, the patient has not had new symptoms/events.     Past Medical History:   Diagnosis Date    Cough variant asthma 2/6/2014    Hodgkin disease ([facility] [address]     Hodgkin's       Past Surgical History:   Procedure Laterality Date    BREAST FIBROADENOMA SURGERY  01/2013    TYMPANOSTOMY TUBE PLACEMENT  1997       Family History   Problem Relation Age of Onset    High Blood Pressure Mother     High Blood Pressure Father     High Blood Pressure Maternal Aunt     High Blood Pressure Maternal Grandmother     High Blood Pressure Maternal Grandfather     Diabetes Other         mom's side    Cancer Neg Hx     Breast Cancer Neg Hx     Prostate Cancer Neg Hx     Depression Neg Hx        Social History     Socioeconomic History    Marital status:      Spouse name: Not on file    Number of children: Not on file    Years of education: Not on file    Highest education level: Not on file   Occupational History    Not on file   Social Needs    Financial resource strain: Not on file    Food insecurity:     Worry: Never true     Inability: Never true   ClearFlow needs:     Medical: No     Non-medical: No   Tobacco Use    Smoking status: Never Smoker    Smokeless tobacco: Never Used   Substance and Sexual Activity    Alcohol use: No    Drug use: No    Sexual activity: Yes     Partners: Male   Lifestyle    Physical activity:     Days per week: Not on file Neurological: Negative for dizziness, seizures, syncope and light-headedness. Psychiatric/Behavioral: Negative for agitation, behavioral problems and confusion. Physical Examination:    /70 (Site: Left Upper Arm, Position: Sitting, Cuff Size: Large Adult)   Pulse 82   SpO2 97%    Physical Exam   Constitutional: The patient appears healthy. No distress. HENT: Mouth/Throat: Oropharynx is clear. Eyes: Pupils are equal, round, and reactive to light. Neck: Normal range of motion. No JVD present. Cardiovascular: Regular rhythm, S1 normal, S2 normal, normal heart sounds and intact distal pulses. Exam reveals no gallop. No murmur heard. Pulses:       Radial pulses are 2+ on the right side. Dorsalis pedis pulses are 2+ on the right side. Pulmonary/Chest: Effort normal and breath sounds normal. No wheezes. No rales. No tenderness. Abdominal: Soft. Bowel sounds are normal.   Musculoskeletal: Normal range of motion. No edema. Neurological: The patient is alert and oriented to person, place, and time. Intact cranial nerves. Skin: Skin is warm and dry. No rash noted.        LABS:  CBC:   Lab Results   Component Value Date    WBC 8.7 11/16/2019    RBC 2.93 11/16/2019    RBC 4.11 04/01/2012    HGB 9.9 11/16/2019    HCT 28.9 11/16/2019    MCV 98.6 11/16/2019    MCH 33.8 11/16/2019    MCHC 34.3 11/16/2019    RDW 13.2 11/16/2019     11/16/2019    MPV 6.6 12/30/2014     Lipids:No results found for: CHOL  No results found for: TRIG  No results found for: HDL  No results found for: LDLCHOLESTEROL, LDLCALC  No results found for: LABVLDL, VLDL  No results found for: CHOLHDLRATIO  CMP:    Lab Results   Component Value Date     11/14/2019    K 4.0 11/14/2019     11/14/2019    CO2 21 11/14/2019    BUN 25 11/14/2019    CREATININE 0.68 11/14/2019    GFRAA >60.0 11/14/2019    LABGLOM >60.0 11/14/2019    GLUCOSE 93 11/14/2019    GLUCOSE 90 04/01/2012    PROT 6.4 11/14/2019    LABALBU 3.8 11/14/2019    LABALBU 5.1 04/01/2012    CALCIUM 9.6 11/14/2019    BILITOT <0.2 11/14/2019    ALKPHOS 117 11/14/2019    AST 18 11/14/2019    ALT 7 11/14/2019     BMP:    Lab Results   Component Value Date     11/14/2019    K 4.0 11/14/2019     11/14/2019    CO2 21 11/14/2019    BUN 25 11/14/2019    LABALBU 3.8 11/14/2019    LABALBU 5.1 04/01/2012    CREATININE 0.68 11/14/2019    CALCIUM 9.6 11/14/2019    GFRAA >60.0 11/14/2019    LABGLOM >60.0 11/14/2019    GLUCOSE 93 11/14/2019    GLUCOSE 90 04/01/2012     Magnesium:    Lab Results   Component Value Date    MG 1.7 09/03/2019     TSH:  Lab Results   Component Value Date    TSH 0.744 02/12/2019       Patient Active Problem List   Diagnosis    Anemia    Cough variant asthma    Hodgkin's lymphoma ([facility] [address] in remission    Single episode of elevated blood pressure    Thrombocytopenia affecting pregnancy (HCC)       Medications:  Current Outpatient Medications   Medication Sig Dispense Refill    norelgestromin-ethinyl estradiol (ORTHO EVRA) 150-35 MCG/24HR Place 1 patch onto the skin once a week 3 patch 3    sertraline (ZOLOFT) 50 MG tablet Take 1 tablet by mouth daily 30 tablet 1    ferrous sulfate 325 (65 Fe) MG tablet Take 1 tablet by mouth 2 times daily (with meals) 60 tablet 0    ibuprofen (ADVIL;MOTRIN) 600 MG tablet Take 1 tablet by mouth every 6 hours as needed for Pain 120 tablet 3    Prenatal Multivit-Min-Fe-FA (PRENATAL VITAMINS PO) Take by mouth      loratadine (CLARITIN) 10 MG tablet Take 10 mg by mouth daily       No current facility-administered medications for this visit. Assessment/Plan:    1. Tachycardia  Resolved after delivery       Counseling: the patient was counseled regarding diet, exercise, weight control and heart healthy lifestyle. Return if symptoms worsen or fail to improve. Electronically signed by   Migdalia Roberson.  Terri Johnson MD [facility] Director of Cardiology Services and Cardiac Catheterization Laboratory  Time Dulce Coughlin    on 1/12/2020 at 11:45 PM

## 2020-01-14 ENCOUNTER — OFFICE VISIT (OUTPATIENT)
Dept: FAMILY MEDICINE CLINIC | Age: 25
End: 2020-01-14
Payer: MEDICAID

## 2020-01-14 VITALS
HEIGHT: 63 IN | TEMPERATURE: 98 F | WEIGHT: 125 LBS | DIASTOLIC BLOOD PRESSURE: 78 MMHG | SYSTOLIC BLOOD PRESSURE: 122 MMHG | OXYGEN SATURATION: 99 % | BODY MASS INDEX: 22.15 KG/M2 | HEART RATE: 97 BPM

## 2020-01-14 PROCEDURE — 99213 OFFICE O/P EST LOW 20 MIN: CPT | Performed by: NURSE PRACTITIONER

## 2020-01-14 PROCEDURE — 1036F TOBACCO NON-USER: CPT | Performed by: NURSE PRACTITIONER

## 2020-01-14 PROCEDURE — G8482 FLU IMMUNIZE ORDER/ADMIN: HCPCS | Performed by: NURSE PRACTITIONER

## 2020-01-14 PROCEDURE — G8427 DOCREV CUR MEDS BY ELIG CLIN: HCPCS | Performed by: NURSE PRACTITIONER

## 2020-01-14 PROCEDURE — G8420 CALC BMI NORM PARAMETERS: HCPCS | Performed by: NURSE PRACTITIONER

## 2020-01-14 ASSESSMENT — ENCOUNTER SYMPTOMS
COUGH: 0
SHORTNESS OF BREATH: 0

## 2020-01-14 ASSESSMENT — PATIENT HEALTH QUESTIONNAIRE - PHQ9
SUM OF ALL RESPONSES TO PHQ QUESTIONS 1-9: 0
SUM OF ALL RESPONSES TO PHQ QUESTIONS 1-9: 0
SUM OF ALL RESPONSES TO PHQ9 QUESTIONS 1 & 2: 0
1. LITTLE INTEREST OR PLEASURE IN DOING THINGS: 0
2. FEELING DOWN, DEPRESSED OR HOPELESS: 0

## 2020-01-14 NOTE — PROGRESS NOTES
Subjective  Chief Complaint   Patient presents with    Postpartum Care    Menstrual Problem     pt states that her period has lasted a week and a half and is still heavy.  Anxiety       HPI    Last time I saw pt I restarted her on zoloft. Has noticed improvement of mood- both anxiety and depression. Tolerating medication ok. No current concerns. Have been on menses for about a week and a half. States that it is fairly heavy. Wants to make sure it is ok.        Patient Active Problem List    Diagnosis Date Noted    Single episode of elevated blood pressure     Thrombocytopenia affecting pregnancy ([address]     Hodgkin's lymphoma ([facility] [address] in remission 06/05/2014    Cough variant asthma 02/06/2014    Anemia 08/27/2013     Past Medical History:   Diagnosis Date    Cough variant asthma 2/6/2014    Hodgkin disease ([facility] [address]     Hodgkin's     Past Surgical History:   Procedure Laterality Date    BREAST FIBROADENOMA SURGERY  01/2013    TYMPANOSTOMY TUBE PLACEMENT  1997     Family History   Problem Relation Age of Onset    High Blood Pressure Mother     High Blood Pressure Father     High Blood Pressure Maternal Aunt     High Blood Pressure Maternal Grandmother     High Blood Pressure Maternal Grandfather     Diabetes Other         mom's side    Cancer Neg Hx     Breast Cancer Neg Hx     Prostate Cancer Neg Hx     Depression Neg Hx      Social History     Socioeconomic History    Marital status:      Spouse name: None    Number of children: None    Years of education: None    Highest education level: None   Occupational History    None   Social Needs    Financial resource strain: None    Food insecurity:     Worry: Never true     Inability: Never true    Transportation needs:     Medical: No     Non-medical: No   Tobacco Use    Smoking status: Never Smoker    Smokeless tobacco: Never Used   Substance and Sexual Activity    Alcohol use: No    Drug use: No    Sexual activity: Yes Constitutional:       Appearance: Normal appearance. She is normal weight. Skin:     General: Skin is warm. Neurological:      General: No focal deficit present. Mental Status: She is alert and oriented to person, place, and time. Mental status is at baseline. Psychiatric:         Mood and Affect: Mood normal.         Behavior: Behavior normal.         Thought Content: Thought content normal.         Judgment: Judgment normal.     Assessment & Plan     Diagnosis Orders   1. Anxiety     2. Depression, unspecified depression type     3. Menometrorrhagia         Continue with current medication. Fu in 6 mos. Recommended waiting a few mos for menses to regulate. Let us know if not improving. Side effects, adverse effects of the medication prescribed today, as well as treatment plan/ rationale and result expectations have been discussed with the patient who expresses understanding and desires to proceed. Close follow up to evaluate treatment results and for coordination of care. I have reviewed the patient's medical history in detail and updated the computerized patient record. As always, patient is advised that if symptoms worsen in any way they will proceed to the nearest emergency room.      ADRIAN Mack - CNP

## 2020-04-09 ENCOUNTER — HOSPITAL ENCOUNTER (EMERGENCY)
Age: 25
Discharge: HOME | End: 2020-04-09
Payer: MEDICAID

## 2020-04-09 ENCOUNTER — APPOINTMENT (OUTPATIENT)
Dept: CT IMAGING | Age: 25
End: 2020-04-09
Payer: MEDICAID

## 2020-04-09 VITALS
BODY MASS INDEX: 21.26 KG/M2 | HEART RATE: 88 BPM | HEIGHT: 63 IN | SYSTOLIC BLOOD PRESSURE: 117 MMHG | RESPIRATION RATE: 16 BRPM | WEIGHT: 120 LBS | OXYGEN SATURATION: 99 % | DIASTOLIC BLOOD PRESSURE: 79 MMHG | TEMPERATURE: 98.1 F

## 2020-04-09 LAB
ALBUMIN SERPL-MCNC: 4.5 G/DL (ref 3.5–4.6)
ALP BLD-CCNC: 52 U/L (ref 40–130)
ALT SERPL-CCNC: 9 U/L (ref 0–33)
ANION GAP SERPL CALCULATED.3IONS-SCNC: 16 MEQ/L (ref 9–15)
AST SERPL-CCNC: 15 U/L (ref 0–35)
BACTERIA: ABNORMAL /HPF
BASOPHILS ABSOLUTE: 0 K/UL (ref 0–0.2)
BASOPHILS RELATIVE PERCENT: 0.3 %
BILIRUB SERPL-MCNC: 0.3 MG/DL (ref 0.2–0.7)
BILIRUBIN URINE: NEGATIVE
BLOOD, URINE: ABNORMAL
BUN BLDV-MCNC: 25 MG/DL (ref 6–20)
CALCIUM SERPL-MCNC: 9.6 MG/DL (ref 8.5–9.9)
CHLORIDE BLD-SCNC: 102 MEQ/L (ref 95–107)
CLARITY: ABNORMAL
CO2: 23 MEQ/L (ref 20–31)
COLOR: YELLOW
CREAT SERPL-MCNC: 0.64 MG/DL (ref 0.5–0.9)
EOSINOPHILS ABSOLUTE: 0 K/UL (ref 0–0.7)
EOSINOPHILS RELATIVE PERCENT: 0.6 %
EPITHELIAL CELLS, UA: ABNORMAL /HPF (ref 0–5)
GFR AFRICAN AMERICAN: >60
GFR NON-AFRICAN AMERICAN: >60
GLOBULIN: 3.1 G/DL (ref 2.3–3.5)
GLUCOSE BLD-MCNC: 89 MG/DL (ref 70–99)
GLUCOSE URINE: NEGATIVE MG/DL
HCG, URINE, POC: NEGATIVE
HCT VFR BLD CALC: 42.1 % (ref 37–47)
HEMOGLOBIN: 14.5 G/DL (ref 12–16)
HYALINE CASTS: ABNORMAL /HPF (ref 0–5)
KETONES, URINE: NEGATIVE MG/DL
LEUKOCYTE ESTERASE, URINE: ABNORMAL
LIPASE: 35 U/L (ref 12–95)
LYMPHOCYTES ABSOLUTE: 1.8 K/UL (ref 1–4.8)
LYMPHOCYTES RELATIVE PERCENT: 27.8 %
Lab: NORMAL
MCH RBC QN AUTO: 32.2 PG (ref 27–31.3)
MCHC RBC AUTO-ENTMCNC: 34.4 % (ref 33–37)
MCV RBC AUTO: 93.5 FL (ref 82–100)
MONOCYTES ABSOLUTE: 0.3 K/UL (ref 0.2–0.8)
MONOCYTES RELATIVE PERCENT: 5.3 %
NEGATIVE QC PASS/FAIL: NORMAL
NEUTROPHILS ABSOLUTE: 4.2 K/UL (ref 1.4–6.5)
NEUTROPHILS RELATIVE PERCENT: 66 %
NITRITE, URINE: NEGATIVE
PDW BLD-RTO: 13.3 % (ref 11.5–14.5)
PH UA: 5.5 (ref 5–9)
PLATELET # BLD: 261 K/UL (ref 130–400)
POSITIVE QC PASS/FAIL: NORMAL
POTASSIUM SERPL-SCNC: 4.3 MEQ/L (ref 3.4–4.9)
PROTEIN UA: 100 MG/DL
RBC # BLD: 4.5 M/UL (ref 4.2–5.4)
RBC UA: >100 /HPF (ref 0–5)
SODIUM BLD-SCNC: 141 MEQ/L (ref 135–144)
SPECIFIC GRAVITY UA: 1.02 (ref 1–1.03)
TOTAL PROTEIN: 7.6 G/DL (ref 6.3–8)
URINE REFLEX TO CULTURE: YES
UROBILINOGEN, URINE: 0.2 E.U./DL
WBC # BLD: 6.4 K/UL (ref 4.8–10.8)
WBC UA: ABNORMAL /HPF (ref 0–5)

## 2020-04-09 PROCEDURE — 87086 URINE CULTURE/COLONY COUNT: CPT

## 2020-04-09 PROCEDURE — 36415 COLL VENOUS BLD VENIPUNCTURE: CPT

## 2020-04-09 PROCEDURE — 99284 EMERGENCY DEPT VISIT MOD MDM: CPT

## 2020-04-09 PROCEDURE — 83690 ASSAY OF LIPASE: CPT

## 2020-04-09 PROCEDURE — 6370000000 HC RX 637 (ALT 250 FOR IP): Performed by: NURSE PRACTITIONER

## 2020-04-09 PROCEDURE — 80053 COMPREHEN METABOLIC PANEL: CPT

## 2020-04-09 PROCEDURE — 96374 THER/PROPH/DIAG INJ IV PUSH: CPT

## 2020-04-09 PROCEDURE — 74176 CT ABD & PELVIS W/O CONTRAST: CPT

## 2020-04-09 PROCEDURE — 85025 COMPLETE CBC W/AUTO DIFF WBC: CPT

## 2020-04-09 PROCEDURE — 6360000002 HC RX W HCPCS: Performed by: NURSE PRACTITIONER

## 2020-04-09 PROCEDURE — 81001 URINALYSIS AUTO W/SCOPE: CPT

## 2020-04-09 PROCEDURE — 96375 TX/PRO/DX INJ NEW DRUG ADDON: CPT

## 2020-04-09 RX ORDER — TAMSULOSIN HYDROCHLORIDE 0.4 MG/1
0.8 CAPSULE ORAL DAILY
Status: DISCONTINUED | OUTPATIENT
Start: 2020-04-09 | End: 2020-04-09 | Stop reason: HOSPADM

## 2020-04-09 RX ORDER — HYDROCODONE BITARTRATE AND ACETAMINOPHEN 5; 325 MG/1; MG/1
1 TABLET ORAL EVERY 6 HOURS PRN
Qty: 10 TABLET | Refills: 0 | Status: SHIPPED | OUTPATIENT
Start: 2020-04-09 | End: 2020-04-14

## 2020-04-09 RX ORDER — KETOROLAC TROMETHAMINE 30 MG/ML
30 INJECTION, SOLUTION INTRAMUSCULAR; INTRAVENOUS ONCE
Status: COMPLETED | OUTPATIENT
Start: 2020-04-09 | End: 2020-04-09

## 2020-04-09 RX ORDER — TAMSULOSIN HYDROCHLORIDE 0.4 MG/1
0.4 CAPSULE ORAL DAILY
Qty: 10 CAPSULE | Refills: 0 | Status: SHIPPED | OUTPATIENT
Start: 2020-04-09 | End: 2020-06-01

## 2020-04-09 RX ORDER — ONDANSETRON 2 MG/ML
4 INJECTION INTRAMUSCULAR; INTRAVENOUS EVERY 10 MIN PRN
Status: DISCONTINUED | OUTPATIENT
Start: 2020-04-09 | End: 2020-04-09 | Stop reason: HOSPADM

## 2020-04-09 RX ORDER — MORPHINE SULFATE 2 MG/ML
4 INJECTION, SOLUTION INTRAMUSCULAR; INTRAVENOUS EVERY 30 MIN PRN
Status: DISCONTINUED | OUTPATIENT
Start: 2020-04-09 | End: 2020-04-09 | Stop reason: HOSPADM

## 2020-04-09 RX ORDER — IBUPROFEN 600 MG/1
600 TABLET ORAL EVERY 6 HOURS PRN
Qty: 20 TABLET | Refills: 0 | Status: SHIPPED | OUTPATIENT
Start: 2020-04-09 | End: 2020-08-04 | Stop reason: SDUPTHER

## 2020-04-09 RX ADMIN — TAMSULOSIN HYDROCHLORIDE 0.8 MG: 0.4 CAPSULE ORAL at 14:12

## 2020-04-09 RX ADMIN — ONDANSETRON 4 MG: 2 INJECTION INTRAMUSCULAR; INTRAVENOUS at 14:12

## 2020-04-09 RX ADMIN — MORPHINE SULFATE 4 MG: 2 INJECTION, SOLUTION INTRAMUSCULAR; INTRAVENOUS at 14:12

## 2020-04-09 RX ADMIN — KETOROLAC TROMETHAMINE 30 MG: 30 INJECTION, SOLUTION INTRAMUSCULAR at 13:26

## 2020-04-09 ASSESSMENT — ENCOUNTER SYMPTOMS
NAUSEA: 0
RHINORRHEA: 0
SHORTNESS OF BREATH: 0
BACK PAIN: 0
COUGH: 0
VOMITING: 0
PHOTOPHOBIA: 0
SORE THROAT: 0
EYE PAIN: 0
ABDOMINAL PAIN: 1
DIARRHEA: 0

## 2020-04-09 ASSESSMENT — PAIN SCALES - GENERAL
PAINLEVEL_OUTOF10: 8
PAINLEVEL_OUTOF10: 5
PAINLEVEL_OUTOF10: 8

## 2020-04-09 NOTE — ED PROVIDER NOTES
[facility] ED  EMERGENCY DEPARTMENT ENCOUNTER      Pt Name: Erwin Weeks  MRN: [id number]  [place] 1995  Date of evaluation: 4/9/2020  Provider: Dav Contreras       Chief Complaint   Patient presents with    Abdominal Pain     vaginal pain, side and back pain. pain icreases with urination         HISTORY OF PRESENT ILLNESS   (Location/Symptom, Timing/Onset,Context/Setting, Quality, Duration, Modifying Factors, Severity)  Note limiting factors. Erwin Weeks is a 22 y.o. female who presents to the emergency department with complaints of right flank pain that radiates to rlq today and is described as sharp. This mildly worsens with urination. She denies any nvdc, vaginal dc or pelvic/vaginal pain. Location/Symptom - flank pain  Onset - this am  Context/Setting - as above  Quality - sharp  Duration - constant  Modifying Factors - as above  Severity - moderate        Nursing Notes were reviewed. REVIEW OF SYSTEMS    (2-9 systems for level 4, 10 or more for level 5)     Review of Systems   Constitutional: Negative for chills, diaphoresis, fatigue and fever. HENT: Negative for congestion, rhinorrhea and sore throat. Eyes: Negative for photophobia and pain. Respiratory: Negative for cough and shortness of breath. Cardiovascular: Negative for chest pain and palpitations. Gastrointestinal: Positive for abdominal pain. Negative for diarrhea, nausea and vomiting. Genitourinary: Positive for dysuria and flank pain. Musculoskeletal: Negative for back pain. Skin: Negative for rash. Neurological: Negative for dizziness, light-headedness and headaches. Psychiatric/Behavioral: Negative. All other systems reviewed and are negative. Except as noted above the remainder of the review of systems was reviewed and negative.        PAST MEDICAL HISTORY     Past Medical History:   Diagnosis Date    Cough variant asthma 2/6/2014    Hodgkin disease ([facility] [address]     Hodgkin's     Past Surgical History:   Procedure Laterality Date    BREAST FIBROADENOMA SURGERY  01/2013    TYMPANOSTOMY TUBE PLACEMENT  1997     Social History     Socioeconomic History    Marital status:      Spouse name: None    Number of children: None    Years of education: None    Highest education level: None   Occupational History    None   Social Needs    Financial resource strain: None    Food insecurity     Worry: Never true     Inability: Never true    Transportation needs     Medical: No     Non-medical: No   Tobacco Use    Smoking status: Never Smoker    Smokeless tobacco: Never Used   Substance and Sexual Activity    Alcohol use: No    Drug use: No    Sexual activity: Yes     Partners: Male   Lifestyle    Physical activity     Days per week: None     Minutes per session: None    Stress: None   Relationships    Social connections     Talks on phone: None     Gets together: None     Attends [employer] service: None     Active member of club or organization: None     Attends meetings of clubs or organizations: None     Relationship status: None    Intimate partner violence     Fear of current or ex partner: None     Emotionally abused: None     Physically abused: None     Forced sexual activity: None   Other Topics Concern    None   Social History Narrative    None       SCREENINGS             PHYSICAL EXAM    (up to 7 for level 4, 8 or more for level 5)     ED Triage Vitals [04/09/20 1226]   BP Temp Temp Source Pulse Resp SpO2 Height Weight   117/83 98.1 °F (36.7 °C) Oral 88 18 98 % 5' 3\" (1.6 m) 120 lb (54.4 kg)       Physical Exam  Vitals signs and nursing note reviewed. Constitutional:       General: She is not in acute distress. Appearance: Normal appearance. She is well-developed. She is not diaphoretic. HENT:      Head: Normocephalic and atraumatic.    Eyes:      General: Lids are normal.      Conjunctiva/sclera: Conjunctivae normal. Neck:      Musculoskeletal: Normal range of motion and neck supple. Cardiovascular:      Rate and Rhythm: Normal rate and regular rhythm. Pulses: Normal pulses. Heart sounds: Normal heart sounds. Pulmonary:      Effort: Pulmonary effort is normal.      Breath sounds: Normal breath sounds. Abdominal:      General: Bowel sounds are normal.      Palpations: Abdomen is soft. Tenderness: There is abdominal tenderness. There is right CVA tenderness. Lymphadenopathy:      Cervical: No cervical adenopathy. Skin:     General: Skin is warm and dry. Capillary Refill: Capillary refill takes less than 2 seconds. Findings: No rash. Neurological:      Mental Status: She is alert and oriented to person, place, and time. Psychiatric:         Thought Content: Thought content normal.         Judgment: Judgment normal.         RESULTS     EKG: All EKG's are interpreted by the Emergency Department Physician who either signs or Co-signsthis chart in the absence of a cardiologist.        RADIOLOGY:   [facility] such as CT, Ultrasound and MRI are read by the radiologist. Plain radiographic images are visualized and preliminarily interpreted by the emergency physician with the below findings:        Interpretation per the Radiologist below, if available at the time ofthis note:    CT ABDOMEN PELVIS WO CONTRAST Additional Contrast? None   Final Result      Mildly obstructing 4 mm calculus near the right ureterovesicular junction. Other nonobstructing left-sided calyceal calculi. Mottled appearance of the bone marrow within the L5 vertebral body and throughout the sacrum and bony pelvis, possibly related to patient's underlying lymphoma. Appearance is overall unchanged from 10/22/2018.  No lymphadenopathy.         ==========                     ED BEDSIDE ULTRASOUND:   Performed by ED Physician - none    LABS:  Labs Reviewed   CBC WITH AUTO DIFFERENTIAL - Abnormal; Notable for the following components:       Result Value    MCH 32.2 (*)     All other components within normal limits   COMPREHENSIVE METABOLIC PANEL - Abnormal; Notable for the following components:    Anion Gap 16 (*)     BUN 25 (*)     All other components within normal limits   URINE RT REFLEX TO CULTURE - Abnormal; Notable for the following components:    Clarity, UA CLOUDY (*)     Blood, Urine LARGE (*)     Protein,  (*)     Leukocyte Esterase, Urine TRACE (*)     All other components within normal limits   CULTURE, URINE   LIPASE   MICROSCOPIC URINALYSIS   POC PREGNANCY UR-QUAL       All other labs were within normal range or not returned as of this dictation. EMERGENCY DEPARTMENT COURSE and DIFFERENTIAL DIAGNOSIS/MDM:   Vitals:    Vitals:    04/09/20 1226   BP: 117/83   Pulse: 88   Resp: 18   Temp: 98.1 °F (36.7 °C)   TempSrc: Oral   SpO2: 98%   Weight: 120 lb (54.4 kg)   Height: 5' 3\" (1.6 m)            MDM pt nontoxic and in no distress. VS normal.  Some degree of flank/abd pain. Will obtain labs/rads for evaluation of acute pathology. She will be reevaluated. She is reevaluated. Pain is improved to a 4-5 out of 10 which she rates as fairly comfortable. She does have a 4 mm calculus at the right UVJ. She is offered additional analgesics and she is appreciative of this. She will be provided analgesics for home-going. She will be discharged home in stable condition. Have provided extended discharge instructions to the patient including signs, symptoms and red flags of which to return to the emergency department. they express good understanding of these instructions. Standard anticipatory guidance given to patient upon discharge. Have given them a specific time frame in which to follow-up and who to follow-up with. I have also advised them that they should return to the emergency department if they get worse, or not getting better or develop any new or concerning symptoms.  Patient demonstrates understanding and all questions were answered. CRITICAL CARE TIME       CONSULTS:  None    PROCEDURES:  Unless otherwise noted below, none     Procedures    FINAL IMPRESSION      1. Ureteric stone          DISPOSITION/PLAN   DISPOSITION Decision To Discharge 04/09/2020 02:06:41 PM      PATIENT REFERRED TO:  ADRIAN Church CNP  [address]  [phone]    Call in 1 day  For continued evaluation and management      DISCHARGE MEDICATIONS:  New Prescriptions    HYDROCODONE-ACETAMINOPHEN (NORCO) 5-325 MG PER TABLET    Take 1 tablet by mouth every 6 hours as needed for Pain for up to 5 days.  Sedation precautions please    IBUPROFEN (ADVIL;MOTRIN) 600 MG TABLET    Take 1 tablet by mouth every 6 hours as needed for Pain    TAMSULOSIN (FLOMAX) 0.4 MG CAPSULE    Take 1 capsule by mouth daily          (Please notethat portions of this note were completed with a voice recognition program.  Efforts were made to edit the dictations but occasionally words are mis-transcribed.)    ADRIAN Sánchez CNP (electronically signed)  Attending Emergency Physician          ADRIAN Sánchez CNP  04/09/20 5065

## 2020-04-10 ENCOUNTER — CARE COORDINATION (OUTPATIENT)
Dept: CARE COORDINATION | Age: 25
End: 2020-04-10

## 2020-04-11 LAB — URINE CULTURE, ROUTINE: NORMAL

## 2020-04-13 ENCOUNTER — TELEPHONE (OUTPATIENT)
Dept: FAMILY MEDICINE CLINIC | Age: 25
End: 2020-04-13

## 2020-05-30 ENCOUNTER — HOSPITAL ENCOUNTER (EMERGENCY)
Age: 25
Discharge: HOME | End: 2020-05-30
Payer: MEDICAID

## 2020-05-30 ENCOUNTER — APPOINTMENT (OUTPATIENT)
Dept: GENERAL RADIOLOGY | Age: 25
End: 2020-05-30
Payer: MEDICAID

## 2020-05-30 VITALS
BODY MASS INDEX: 20.38 KG/M2 | WEIGHT: 115 LBS | SYSTOLIC BLOOD PRESSURE: 112 MMHG | OXYGEN SATURATION: 99 % | RESPIRATION RATE: 16 BRPM | TEMPERATURE: 97.6 F | DIASTOLIC BLOOD PRESSURE: 72 MMHG | HEIGHT: 63 IN | HEART RATE: 78 BPM

## 2020-05-30 PROCEDURE — 12001 RPR S/N/AX/GEN/TRNK 2.5CM/<: CPT

## 2020-05-30 PROCEDURE — 73130 X-RAY EXAM OF HAND: CPT

## 2020-05-30 PROCEDURE — 6370000000 HC RX 637 (ALT 250 FOR IP): Performed by: PHYSICIAN ASSISTANT

## 2020-05-30 PROCEDURE — 99282 EMERGENCY DEPT VISIT SF MDM: CPT

## 2020-05-30 RX ADMIN — Medication 1 EACH: at 09:26

## 2020-05-30 ASSESSMENT — PAIN DESCRIPTION - LOCATION: LOCATION: HAND

## 2020-05-30 ASSESSMENT — ENCOUNTER SYMPTOMS
RHINORRHEA: 0
SORE THROAT: 0
CONSTIPATION: 0
ABDOMINAL PAIN: 0
EYE DISCHARGE: 0
SHORTNESS OF BREATH: 0
COLOR CHANGE: 0
ABDOMINAL DISTENTION: 0

## 2020-05-30 ASSESSMENT — PAIN SCALES - GENERAL: PAINLEVEL_OUTOF10: 7

## 2020-05-30 ASSESSMENT — PAIN DESCRIPTION - ORIENTATION: ORIENTATION: LEFT

## 2020-05-30 NOTE — ED PROVIDER NOTES
systems was reviewed and negative.        PAST MEDICAL HISTORY     Past Medical History:   Diagnosis Date    Cough variant asthma 2/6/2014    Hodgkin disease ([facility] [address]     Hodgkin's         SURGICALHISTORY       Past Surgical History:   Procedure Laterality Date    BREAST FIBROADENOMA SURGERY  01/2013    TYMPANOSTOMY TUBE PLACEMENT  1997         CURRENT MEDICATIONS       Previous Medications    FERROUS SULFATE 325 (65 FE) MG TABLET    Take 1 tablet by mouth 2 times daily (with meals)    IBUPROFEN (ADVIL;MOTRIN) 600 MG TABLET    Take 1 tablet by mouth every 6 hours as needed for Pain    LORATADINE (CLARITIN) 10 MG TABLET    Take 10 mg by mouth daily    NORELGESTROMIN-ETHINYL ESTRADIOL (ORTHO EVRA) 150-35 MCG/24HR    Place 1 patch onto the skin once a week    PRENATAL MULTIVIT-MIN-FE-FA (PRENATAL VITAMINS PO)    Take by mouth    SERTRALINE (ZOLOFT) 50 MG TABLET    Take 1 tablet by mouth daily    TAMSULOSIN (FLOMAX) 0.4 MG CAPSULE    Take 1 capsule by mouth daily       ALLERGIES     Penicillins    FAMILY HISTORY       Family History   Problem Relation Age of Onset    High Blood Pressure Mother     High Blood Pressure Father     High Blood Pressure Maternal Aunt     High Blood Pressure Maternal Grandmother     High Blood Pressure Maternal Grandfather     Diabetes Other         mom's side    Cancer Neg Hx     Breast Cancer Neg Hx     Prostate Cancer Neg Hx     Depression Neg Hx           SOCIAL HISTORY       Social History     Socioeconomic History    Marital status:      Spouse name: None    Number of children: None    Years of education: None    Highest education level: None   Occupational History    None   Social Needs    Financial resource strain: None    Food insecurity     Worry: Never true     Inability: Never true    Transportation needs     Medical: No     Non-medical: No   Tobacco Use    Smoking status: Never Smoker    Smokeless tobacco: Never Used   Substance and Sexual Activity   

## 2020-06-01 ENCOUNTER — OFFICE VISIT (OUTPATIENT)
Dept: OBGYN CLINIC | Age: 25
End: 2020-06-01
Payer: MEDICAID

## 2020-06-01 ENCOUNTER — CARE COORDINATION (OUTPATIENT)
Dept: CARE COORDINATION | Age: 25
End: 2020-06-01

## 2020-06-01 VITALS
BODY MASS INDEX: 20.2 KG/M2 | DIASTOLIC BLOOD PRESSURE: 80 MMHG | HEIGHT: 63 IN | SYSTOLIC BLOOD PRESSURE: 100 MMHG | WEIGHT: 114 LBS

## 2020-06-01 PROCEDURE — 99395 PREV VISIT EST AGE 18-39: CPT | Performed by: OBSTETRICS & GYNECOLOGY

## 2020-06-01 NOTE — PROGRESS NOTES
SUBJECTIVE:   22 y.o.  female here for annual exam. Pt with regular menses on Xulane and no complaints today. Review of Systems:  General ROS: negative  Psychological ROS: negative  ENT ROS: negative  Endocrine ROS: negative  Respiratory ROS: no cough, shortness of breath, or wheezing  Cardiovascular ROS: no chest pain or dyspnea on exertion  Gastrointestinal ROS: no abdominal pain, change in bowel habits, or black or bloody stools  Genito-Urinary ROS: no dysuria, trouble voiding, or hematuria  Musculoskeletal ROS: negative  Neurological ROS: no TIA or stroke symptoms  Dermatological ROS: negative    OBJECTIVE:   LMP 2020 (Exact Date)     Physical Exam:  CONSTITUTIONAL: She appears well nourished and developed   NEUROLOGIC: Alert and oriented to time, place and person  NECK: no thyroidmegaly  BREASTS: Bilateral breasts without masses, lesions or nipple discharge  LUNGS: Clear to ascultation bilaterally  CVS: regular rate and rhythm  LYMPHATIC: No palpable lymph nodes  ABDOMEN: benign, soft, nontender, no masses. No liver or splenic organomegaly. No evidence of abdominal or inguinal hernia. No indication for occult blood testing  SKIN: normal texture and tone, no lesions, patch in place  NEURO: normal tone, no hyperreflexia, 1+DTRs throughout    Pelvic Exam:   EFG: normal external genitalia  URETHRAL MEATUS: normal size, no diverticula   URETHRA: normal appearing without diverticula or lesions  BLADDER:  No masses or tenderness  VAGINA: normal rugae, no discharge   CERVIX: parous, no lesions  UTERUS: uterus is normal size, shape, consistency and nontender   ADNEXA: normal adnexa in size, nontender and no masses. PERINEUM: normal appearing without lesions or masses  RECTUM: no hemorrhoids or rectal masses. ASSESSMENT:   Routine gynecologic exam    PLAN:   Pap with hpv pending  PT will call for refill of xulane as needed  Past medical, social and family history reviewed and updated in pt's chart. Routine health screenings and precautions discussed.

## 2020-06-02 ENCOUNTER — CARE COORDINATION (OUTPATIENT)
Dept: CARE COORDINATION | Age: 25
End: 2020-06-02

## 2020-06-02 NOTE — CARE COORDINATION
Attempted to contact patient for post ED COVID-19 Monitoring. Unable to reach patient by phone. Message left regarding the purpose of this call. Number provided and call back requested.

## 2020-07-20 ENCOUNTER — TELEPHONE (OUTPATIENT)
Dept: OBGYN CLINIC | Age: 25
End: 2020-07-20

## 2020-07-20 NOTE — TELEPHONE ENCOUNTER
Alejo Torres requesting refill Please approve or deny refill request. Medication pended. Last OV: 06/01/20  Last RX: 04/23/20    IS THIS A CONTROLLED MEDICATION: No      [id number] , [address] [place] [address]  Phone: [phone] Fax: [phone]      Patient was advised to check with their pharmacy within 24-48 hours:  yes    Next Visit Date:  No future appointments.     Requested Prescriptions      No prescriptions requested or ordered in this encounter       Bryan bhardwaj

## 2020-08-04 ENCOUNTER — APPOINTMENT (OUTPATIENT)
Dept: CT IMAGING | Age: 25
End: 2020-08-04
Payer: MEDICAID

## 2020-08-04 ENCOUNTER — HOSPITAL ENCOUNTER (EMERGENCY)
Age: 25
Discharge: HOME | End: 2020-08-04
Payer: MEDICAID

## 2020-08-04 VITALS
RESPIRATION RATE: 16 BRPM | OXYGEN SATURATION: 99 % | HEIGHT: 63 IN | HEART RATE: 95 BPM | WEIGHT: 115 LBS | TEMPERATURE: 98.4 F | SYSTOLIC BLOOD PRESSURE: 120 MMHG | DIASTOLIC BLOOD PRESSURE: 90 MMHG | BODY MASS INDEX: 20.38 KG/M2

## 2020-08-04 LAB
ALBUMIN SERPL-MCNC: 4.5 G/DL (ref 3.5–4.6)
ALP BLD-CCNC: 47 U/L (ref 40–130)
ALT SERPL-CCNC: <5 U/L (ref 0–33)
ANION GAP SERPL CALCULATED.3IONS-SCNC: 13 MEQ/L (ref 9–15)
AST SERPL-CCNC: 15 U/L (ref 0–35)
BACTERIA: ABNORMAL /HPF
BASOPHILS ABSOLUTE: 0 K/UL (ref 0–0.2)
BASOPHILS RELATIVE PERCENT: 0.6 %
BILIRUB SERPL-MCNC: 0.3 MG/DL (ref 0.2–0.7)
BILIRUBIN URINE: NEGATIVE
BLOOD, URINE: NEGATIVE
BUN BLDV-MCNC: 25 MG/DL (ref 6–20)
CALCIUM SERPL-MCNC: 9.4 MG/DL (ref 8.5–9.9)
CHLORIDE BLD-SCNC: 99 MEQ/L (ref 95–107)
CLARITY: CLEAR
CO2: 24 MEQ/L (ref 20–31)
COLOR: YELLOW
CREAT SERPL-MCNC: 0.63 MG/DL (ref 0.5–0.9)
EOSINOPHILS ABSOLUTE: 0 K/UL (ref 0–0.7)
EOSINOPHILS RELATIVE PERCENT: 0.5 %
EPITHELIAL CELLS, UA: ABNORMAL /HPF (ref 0–5)
GFR AFRICAN AMERICAN: >60
GFR NON-AFRICAN AMERICAN: >60
GLOBULIN: 2.9 G/DL (ref 2.3–3.5)
GLUCOSE BLD-MCNC: 85 MG/DL (ref 70–99)
GLUCOSE URINE: NEGATIVE MG/DL
HCT VFR BLD CALC: 41.3 % (ref 37–47)
HEMOGLOBIN: 14.1 G/DL (ref 12–16)
HYALINE CASTS: ABNORMAL /HPF (ref 0–5)
KETONES, URINE: NEGATIVE MG/DL
LEUKOCYTE ESTERASE, URINE: ABNORMAL
LYMPHOCYTES ABSOLUTE: 2.4 K/UL (ref 1–4.8)
LYMPHOCYTES RELATIVE PERCENT: 40.8 %
MCH RBC QN AUTO: 31.8 PG (ref 27–31.3)
MCHC RBC AUTO-ENTMCNC: 34.1 % (ref 33–37)
MCV RBC AUTO: 93.1 FL (ref 82–100)
MONOCYTES ABSOLUTE: 0.3 K/UL (ref 0.2–0.8)
MONOCYTES RELATIVE PERCENT: 5 %
NEUTROPHILS ABSOLUTE: 3.1 K/UL (ref 1.4–6.5)
NEUTROPHILS RELATIVE PERCENT: 53.1 %
NITRITE, URINE: NEGATIVE
PDW BLD-RTO: 13.1 % (ref 11.5–14.5)
PH UA: 7 (ref 5–9)
PLATELET # BLD: 276 K/UL (ref 130–400)
POTASSIUM SERPL-SCNC: 4.3 MEQ/L (ref 3.4–4.9)
PROTEIN UA: NEGATIVE MG/DL
RBC # BLD: 4.44 M/UL (ref 4.2–5.4)
RBC UA: ABNORMAL /HPF (ref 0–5)
SODIUM BLD-SCNC: 136 MEQ/L (ref 135–144)
SPECIFIC GRAVITY UA: 1.03 (ref 1–1.03)
TOTAL PROTEIN: 7.4 G/DL (ref 6.3–8)
URINE REFLEX TO CULTURE: YES
UROBILINOGEN, URINE: 0.2 E.U./DL
WBC # BLD: 5.9 K/UL (ref 4.8–10.8)
WBC UA: ABNORMAL /HPF (ref 0–5)

## 2020-08-04 PROCEDURE — 81001 URINALYSIS AUTO W/SCOPE: CPT

## 2020-08-04 PROCEDURE — 87086 URINE CULTURE/COLONY COUNT: CPT

## 2020-08-04 PROCEDURE — 6360000002 HC RX W HCPCS

## 2020-08-04 PROCEDURE — 96374 THER/PROPH/DIAG INJ IV PUSH: CPT

## 2020-08-04 PROCEDURE — 99284 EMERGENCY DEPT VISIT MOD MDM: CPT

## 2020-08-04 PROCEDURE — 80053 COMPREHEN METABOLIC PANEL: CPT

## 2020-08-04 PROCEDURE — 85025 COMPLETE CBC W/AUTO DIFF WBC: CPT

## 2020-08-04 PROCEDURE — 74176 CT ABD & PELVIS W/O CONTRAST: CPT

## 2020-08-04 PROCEDURE — 36415 COLL VENOUS BLD VENIPUNCTURE: CPT

## 2020-08-04 RX ORDER — KETOROLAC TROMETHAMINE 30 MG/ML
INJECTION, SOLUTION INTRAMUSCULAR; INTRAVENOUS
Status: COMPLETED
Start: 2020-08-04 | End: 2020-08-04

## 2020-08-04 RX ORDER — SULFAMETHOXAZOLE AND TRIMETHOPRIM 800; 160 MG/1; MG/1
1 TABLET ORAL 2 TIMES DAILY
Qty: 14 TABLET | Refills: 0 | Status: SHIPPED | OUTPATIENT
Start: 2020-08-04 | End: 2020-08-11

## 2020-08-04 RX ORDER — IBUPROFEN 600 MG/1
600 TABLET ORAL EVERY 6 HOURS PRN
Qty: 20 TABLET | Refills: 0 | Status: SHIPPED | OUTPATIENT
Start: 2020-08-04 | End: 2021-02-24 | Stop reason: ALTCHOICE

## 2020-08-04 RX ORDER — KETOROLAC TROMETHAMINE 30 MG/ML
30 INJECTION, SOLUTION INTRAMUSCULAR; INTRAVENOUS ONCE
Status: COMPLETED | OUTPATIENT
Start: 2020-08-04 | End: 2020-08-04

## 2020-08-04 RX ADMIN — KETOROLAC TROMETHAMINE 30 MG: 30 INJECTION, SOLUTION INTRAMUSCULAR at 19:20

## 2020-08-04 RX ADMIN — KETOROLAC TROMETHAMINE 30 MG: 30 INJECTION, SOLUTION INTRAMUSCULAR; INTRAVENOUS at 19:20

## 2020-08-04 ASSESSMENT — PAIN DESCRIPTION - FREQUENCY
FREQUENCY: CONTINUOUS
FREQUENCY: CONTINUOUS

## 2020-08-04 ASSESSMENT — PAIN DESCRIPTION - ORIENTATION
ORIENTATION: LEFT
ORIENTATION: LEFT

## 2020-08-04 ASSESSMENT — PAIN DESCRIPTION - LOCATION
LOCATION: FLANK

## 2020-08-04 ASSESSMENT — PAIN DESCRIPTION - DESCRIPTORS
DESCRIPTORS: DULL

## 2020-08-04 ASSESSMENT — PAIN DESCRIPTION - PAIN TYPE
TYPE: ACUTE PAIN

## 2020-08-04 ASSESSMENT — ENCOUNTER SYMPTOMS
APNEA: 0
NAUSEA: 0
VOICE CHANGE: 0
EYE DISCHARGE: 0
SHORTNESS OF BREATH: 0
ABDOMINAL DISTENTION: 0
VOMITING: 0
ANAL BLEEDING: 0
COUGH: 0
PHOTOPHOBIA: 0
ABDOMINAL PAIN: 1
BACK PAIN: 1

## 2020-08-04 ASSESSMENT — PAIN SCALES - GENERAL
PAINLEVEL_OUTOF10: 2
PAINLEVEL_OUTOF10: 6
PAINLEVEL_OUTOF10: 5
PAINLEVEL_OUTOF10: 2

## 2020-08-04 NOTE — ED PROVIDER NOTES
[facility] ED  eMERGENCY dEPARTMENT eNCOUnter      Pt Name: Ofelia Roberts  MRN: [id number]  [place] 1995  Date of evaluation: 8/4/2020  Provider: Deng Alonso Dr       Chief Complaint   Patient presents with    Flank Pain     left sided         HISTORY OF PRESENT ILLNESS   (Location/Symptom, Timing/Onset,Context/Setting, Quality, Duration, Modifying Factors, Severity)  Note limiting factors. Ofelia Roberts is a 22 y.o. female who presents to the emergency department left back flank upper left abdominal pain. Patient also has pain with urination urgency denies bleeding with urination rectal bleeding denies chest pain shortness of breath nausea vomiting fever. She does have history of kidney stones and UTIs. States last kidney stone was right sided symptoms are moderate severity nothing improves symptoms touch or motion worsens symptoms. HPI    NursingNotes were reviewed. REVIEW OF SYSTEMS    (2-9 systems for level 4, 10 or more for level 5)     Review of Systems   Constitutional: Negative for activity change, appetite change, chills, fever and unexpected weight change. HENT: Negative for ear discharge, nosebleeds and voice change. Eyes: Negative for photophobia and discharge. Respiratory: Negative for apnea, cough and shortness of breath. Cardiovascular: Negative for chest pain, palpitations and leg swelling. Gastrointestinal: Positive for abdominal pain. Negative for abdominal distention, anal bleeding, nausea and vomiting. Endocrine: Negative for cold intolerance, heat intolerance and polyphagia. Genitourinary: Positive for dysuria, flank pain and urgency. Negative for genital sores and hematuria. Musculoskeletal: Positive for back pain. Negative for joint swelling. Skin: Negative for pallor. Allergic/Immunologic: Negative for immunocompromised state. Neurological: Negative for seizures and facial asymmetry.    Hematological: Does not bruise/bleed easily. Psychiatric/Behavioral: Negative for behavioral problems, self-injury and sleep disturbance. All other systems reviewed and are negative. Except as noted above the remainder of the review of systems was reviewed and negative.        PAST MEDICAL HISTORY     Past Medical History:   Diagnosis Date    Cough variant asthma 2/6/2014    Hodgkin disease ([facility] [address]     Hodgkin's         SURGICALHISTORY       Past Surgical History:   Procedure Laterality Date    BREAST FIBROADENOMA SURGERY  01/2013    TYMPANOSTOMY TUBE PLACEMENT  1997         CURRENT MEDICATIONS       Discharge Medication List as of 8/4/2020  7:39 PM      CONTINUE these medications which have NOT CHANGED    Details   norelgestromin-ethinyl estradiol (ORTHO EVRA) 150-35 MCG/24HR Place 1 patch onto the skin once a week, Disp-12 patch,R-3Normal      sertraline (ZOLOFT) 50 MG tablet Take 1 tablet by mouth daily, Disp-30 tablet, R-1Normal      loratadine (CLARITIN) 10 MG tablet Take 10 mg by mouth dailyHistorical Med             ALLERGIES     Penicillins    FAMILY HISTORY       Family History   Problem Relation Age of Onset    High Blood Pressure Mother     High Blood Pressure Father     High Blood Pressure Maternal Aunt     High Blood Pressure Maternal Grandmother     High Blood Pressure Maternal Grandfather     Diabetes Other         mom's side    Cancer Neg Hx     Breast Cancer Neg Hx     Prostate Cancer Neg Hx     Depression Neg Hx           SOCIAL HISTORY       Social History     Socioeconomic History    Marital status:      Spouse name: None    Number of children: None    Years of education: None    Highest education level: None   Occupational History    None   Social Needs    Financial resource strain: None    Food insecurity     Worry: Never true     Inability: Never true    Transportation needs     Medical: No     Non-medical: No   Tobacco Use    Smoking status: Never Smoker    Smokeless tobacco: Never Used   Substance and Sexual Activity    Alcohol use: No    Drug use: No    Sexual activity: Yes     Partners: Male   Lifestyle    Physical activity     Days per week: None     Minutes per session: None    Stress: None   Relationships    Social connections     Talks on phone: None     Gets together: None     Attends [employer] service: None     Active member of club or organization: None     Attends meetings of clubs or organizations: None     Relationship status: None    Intimate partner violence     Fear of current or ex partner: None     Emotionally abused: None     Physically abused: None     Forced sexual activity: None   Other Topics Concern    None   Social History Narrative    None       SCREENINGS      @FLOW([id number])@      PHYSICAL EXAM    (up to 7 for level 4, 8 or more for level 5)     ED Triage Vitals [08/04/20 1659]   BP Temp Temp Source Pulse Resp SpO2 Height Weight   (!) 120/90 98.4 °F (36.9 °C) Oral 95 16 99 % 5' 3\" (1.6 m) 115 lb (52.2 kg)       Physical Exam  Vitals signs and nursing note reviewed. Constitutional:       General: She is not in acute distress. Appearance: Normal appearance. She is well-developed. She is not ill-appearing, toxic-appearing or diaphoretic. HENT:      Head: Normocephalic and atraumatic. Right Ear: Tympanic membrane normal.      Left Ear: Tympanic membrane normal.      Nose: Nose normal.      Mouth/Throat:      Mouth: Mucous membranes are moist.      Pharynx: No oropharyngeal exudate or posterior oropharyngeal erythema. Eyes:      General:         Right eye: No discharge. Left eye: No discharge. Extraocular Movements: Extraocular movements intact. Pupils: Pupils are equal, round, and reactive to light. Neck:      Musculoskeletal: Normal range of motion and neck supple. Cardiovascular:      Rate and Rhythm: Normal rate and regular rhythm. Pulses: Normal pulses. Heart sounds: Normal heart sounds. Pulmonary:      Effort: Pulmonary effort is normal. No respiratory distress. Breath sounds: Normal breath sounds. No stridor. No wheezing, rhonchi or rales. Abdominal:      General: Bowel sounds are normal. There is no distension. Palpations: Abdomen is soft. Tenderness: There is abdominal tenderness. Musculoskeletal: Normal range of motion. General: Tenderness present. Arms:    Skin:     General: Skin is warm. Findings: No bruising or erythema. Neurological:      Mental Status: She is alert and oriented to person, place, and time.          DIAGNOSTIC RESULTS     EKG: All EKG's are interpreted by the Emergency Department Physician who either signs or Co-signsthis chart in the absence of a cardiologist.         RADIOLOGY:   [facility] such as CT, Ultrasound and MRI are read by the radiologist. Plain radiographic images are visualized and preliminarily interpreted by the emergency physician with the below findings:         Interpretation per the Radiologist below, if available at the time ofthis note:    CT ABDOMEN PELVIS WO CONTRAST Additional Contrast? None   Final Result            ED BEDSIDE ULTRASOUND:   Performed by ED Physician - none    LABS:  Labs Reviewed   COMPREHENSIVE METABOLIC PANEL - Abnormal; Notable for the following components:       Result Value    BUN 25 (*)     All other components within normal limits   CBC WITH AUTO DIFFERENTIAL - Abnormal; Notable for the following components:    MCH 31.8 (*)     All other components within normal limits   URINE RT REFLEX TO CULTURE - Abnormal; Notable for the following components:    Leukocyte Esterase, Urine SMALL (*)     All other components within normal limits   MICROSCOPIC URINALYSIS - Abnormal; Notable for the following components:    Bacteria, UA FEW (*)     WBC, UA 10-20 (*)     RBC, UA 3-5 (*)     All other components within normal limits   CULTURE, URINE    Narrative:     ORDER#: [id number] ORDERED BY: SOWMYA HOSKINS  SOURCE: Urine Clean Catch                  COLLECTED:  08/04/20 17:45  ANTIBIOTICS AT [facility].:                      RECEIVED :  08/04/20 19:46       All other labs were within normal range or not returned as of this dictation. EMERGENCY DEPARTMENT COURSE and DIFFERENTIAL DIAGNOSIS/MDM:   Vitals:    Vitals:    08/04/20 1659   BP: (!) 120/90   Pulse: 95   Resp: 16   Temp: 98.4 °F (36.9 °C)   TempSrc: Oral   SpO2: 99%   Weight: 115 lb (52.2 kg)   Height: 5' 3\" (1.6 m)            MDM  Number of Diagnoses or Management Options  Diagnosis management comments: Patient has history of kidney stones UTI. She has symptoms of UTI with also has left flank back and upper abdominal pain will add lab work including blood work urinalysis and noncontrast CAT scan for kidney stones. Amount and/or Complexity of Data Reviewed  Clinical lab tests: ordered  Tests in the radiology section of CPT®: ordered        CRITICAL CARE TIME         CONSULTS:  None    PROCEDURES:  Unless otherwise noted below, none     Procedures    FINAL IMPRESSION      1. Flank pain    2. Urinary tract infection without hematuria, site unspecified    3. Dysuria    4.  Kidney stone          DISPOSITION/PLAN   DISPOSITION        PATIENT REFERRED TO:  ADRIAN Campos - CNP  [address] [facility] [address], [address] [place] [address]  [place] [phone]    Call in 1 day      [facility]) ED  [address]  [address] [address]  [phone]    If symptoms worsen      DISCHARGE MEDICATIONS:  Discharge Medication List as of 8/4/2020  7:39 PM      START taking these medications    Details   sulfamethoxazole-trimethoprim (BACTRIM DS) 800-160 MG per tablet Take 1 tablet by mouth 2 times daily for 7 days, Disp-14 tablet,R-0Print                (Please note that portions of this note were completed with a voice recognition program.  Efforts were made to edit the dictations but occasionally words are mis-transcribed.)    Carin Moreira PA-C (electronically signed)  Attending Emergency Physician       Carin Moreira PA-C  08/12/20 0122

## 2020-08-05 ENCOUNTER — CARE COORDINATION (OUTPATIENT)
Dept: CARE COORDINATION | Age: 25
End: 2020-08-05

## 2020-08-06 ENCOUNTER — CARE COORDINATION (OUTPATIENT)
Dept: CARE COORDINATION | Age: 25
End: 2020-08-06

## 2020-08-06 LAB — URINE CULTURE, ROUTINE: NORMAL

## 2021-02-03 ENCOUNTER — OFFICE VISIT (OUTPATIENT)
Dept: FAMILY MEDICINE CLINIC | Age: 26
End: 2021-02-03
Payer: MEDICAID

## 2021-02-03 VITALS
SYSTOLIC BLOOD PRESSURE: 120 MMHG | DIASTOLIC BLOOD PRESSURE: 72 MMHG | BODY MASS INDEX: 19.53 KG/M2 | TEMPERATURE: 98.7 F | OXYGEN SATURATION: 100 % | WEIGHT: 110.2 LBS | HEART RATE: 77 BPM | HEIGHT: 63 IN

## 2021-02-03 DIAGNOSIS — Z85.71 HISTORY OF HODGKIN'S DISEASE: ICD-10-CM

## 2021-02-03 DIAGNOSIS — B02.9 HERPES ZOSTER WITHOUT COMPLICATION: Primary | ICD-10-CM

## 2021-02-03 PROCEDURE — 99213 OFFICE O/P EST LOW 20 MIN: CPT | Performed by: NURSE PRACTITIONER

## 2021-02-03 PROCEDURE — G8484 FLU IMMUNIZE NO ADMIN: HCPCS | Performed by: NURSE PRACTITIONER

## 2021-02-03 PROCEDURE — 1036F TOBACCO NON-USER: CPT | Performed by: NURSE PRACTITIONER

## 2021-02-03 PROCEDURE — G8427 DOCREV CUR MEDS BY ELIG CLIN: HCPCS | Performed by: NURSE PRACTITIONER

## 2021-02-03 PROCEDURE — G8420 CALC BMI NORM PARAMETERS: HCPCS | Performed by: NURSE PRACTITIONER

## 2021-02-03 RX ORDER — CHLORHEXIDINE GLUCONATE 0.12 MG/ML
RINSE ORAL
COMMUNITY
Start: 2020-11-23 | End: 2021-08-24

## 2021-02-03 RX ORDER — VALACYCLOVIR HYDROCHLORIDE 1 G/1
1000 TABLET, FILM COATED ORAL 3 TIMES DAILY
Qty: 21 TABLET | Refills: 0 | Status: SHIPPED | OUTPATIENT
Start: 2021-02-03 | End: 2021-02-10

## 2021-02-03 ASSESSMENT — ENCOUNTER SYMPTOMS
COLOR CHANGE: 1
COUGH: 0
SHORTNESS OF BREATH: 0

## 2021-02-03 ASSESSMENT — PATIENT HEALTH QUESTIONNAIRE - PHQ9
SUM OF ALL RESPONSES TO PHQ QUESTIONS 1-9: 1
2. FEELING DOWN, DEPRESSED OR HOPELESS: 1
SUM OF ALL RESPONSES TO PHQ QUESTIONS 1-9: 1
1. LITTLE INTEREST OR PLEASURE IN DOING THINGS: 0
SUM OF ALL RESPONSES TO PHQ9 QUESTIONS 1 & 2: 1

## 2021-02-03 NOTE — PROGRESS NOTES
Subjective  Chief Complaint   Patient presents with    Mole     pt states that there is a patch on her back that started with a mole, states that it feels raw to the touch and is getting bigger with time. HPI     Has felt a burning pain and a patch on her back for the past few days. Mole was present in the same area. Has noticed a slightly excoriated lesion in the area  Not putting anything on it. Hx of hodgkins lymphoma. Dx in 2014. Went to [facility]. Has not had any follow up scans since 2016 when she lost her insurance.  Was supposed to have yearly surveillance scans    Patient Active Problem List    Diagnosis Date Noted    Single episode of elevated blood pressure     Thrombocytopenia affecting pregnancy ([address]     Hodg[address]'s [address] ([facility] [address] in remission 06/05/2014    Cough variant asthma 02/06/2014    Anemia 08/27/2013     Past Medical History:   Diagnosis Date    Cough variant asthma 2/6/2014    Hodgkin disease ([facility] [address]     Hodgkin's     Past Surgical History:   Procedure Laterality Date    BREAST FIBROADENOMA SURGERY  01/2013    TYMPANOSTOMY TUBE PLACEMENT  1997     Family History   Problem Relation Age of Onset    High Blood Pressure Mother     High Blood Pressure Father     High Blood Pressure Maternal Aunt     High Blood Pressure Maternal Grandmother     High Blood Pressure Maternal Grandfather     Diabetes Other         mom's side    Cancer Neg Hx     Breast Cancer Neg Hx     Prostate Cancer Neg Hx     Depression Neg Hx      Social History     Socioeconomic History    Marital status:      Spouse name: None    Number of children: None    Years of education: None    Highest education level: None   Occupational History    None   Social Needs    Financial resource strain: Not hard at all   [place] insecurity     Worry: Never true     Inability: Never true    Transportation needs     Medical: No     Non-medical: No   Tobacco Use    Smoking status: Never Smoker    Smokeless tobacco: Never Used   Substance and Sexual Activity    Alcohol use: No    Drug use: No    Sexual activity: Yes     Partners: Male   Lifestyle    Physical activity     Days per week: None     Minutes per session: None    Stress: None   Relationships    Social connections     Talks on phone: None     Gets together: None     Attends Buddhist service: None     Active member of club or organization: None     Attends meetings of clubs or organizations: None     Relationship status: None    Intimate partner violence     Fear of current or ex partner: None     Emotionally abused: None     Physically abused: None     Forced sexual activity: None   Other Topics Concern    None   Social History Narrative    None     Current Outpatient Medications on File Prior to Visit   Medication Sig Dispense Refill    chlorhexidine (PERIDEX) 0.12 % solution swish 15 milliliters by mouth for 30 SECONDS then SPIT use twice a day after meals      norelgestromin-ethinyl estradiol (ORTHO EVRA) 150-35 MCG/24HR Place 1 patch onto the skin once a week 12 patch 3    ibuprofen (ADVIL;MOTRIN) 600 MG tablet Take 1 tablet by mouth every 6 hours as needed for Pain (Patient not taking: Reported on 2/3/2021) 20 tablet 0    sertraline (ZOLOFT) 50 MG tablet Take 1 tablet by mouth daily (Patient not taking: Reported on 2/3/2021) 30 tablet 1    loratadine (CLARITIN) 10 MG tablet Take 10 mg by mouth daily       No current facility-administered medications on file prior to visit. Allergies   Allergen Reactions    Penicillins Hives       Review of Systems   Constitutional: Negative for fatigue. Respiratory: Negative for cough and shortness of breath. Cardiovascular: Negative for chest pain. Skin: Positive for color change.        Objective  Vitals:    02/03/21 1010   BP: 120/72   Site: Left Upper Arm   Position: Sitting   Cuff Size: Medium Adult   Pulse: 77   Temp: 98.7 °F (37.1 °C)   SpO2: 100%   Weight: 110 lb 3.2 oz (50 kg) Height: 5' 3\" (1.6 m)     Physical Exam  Vitals signs and nursing note reviewed. Constitutional:       Appearance: Normal appearance. She is normal weight. HENT:      Head: Normocephalic. Mouth/Throat:      Mouth: Mucous membranes are moist.   Eyes:      Extraocular Movements: Extraocular movements intact. Conjunctiva/sclera: Conjunctivae normal.      Pupils: Pupils are equal, round, and reactive to light. Cardiovascular:      Rate and Rhythm: Normal rate and regular rhythm. Pulses: Normal pulses. Heart sounds: Normal heart sounds. Pulmonary:      Effort: Pulmonary effort is normal.      Breath sounds: Normal breath sounds. Skin:     General: Skin is warm. Neurological:      General: No focal deficit present. Mental Status: She is alert and oriented to person, place, and time. Mental status is at baseline. Psychiatric:         Mood and Affect: Mood normal.         Behavior: Behavior normal.         Thought Content: Thought content normal.         Judgment: Judgment normal.           Assessment & Plan     Diagnosis Orders   1. History of Hodgkin's disease  CT CHEST WO CONTRAST    CT ABDOMEN PELVIS W IV CONTRAST Additional Contrast? Radiologist Recommendation   2.  Herpes zoster without complication  valACYclovir (VALTREX) 1 g tablet       Orders Placed This Encounter   Procedures    CT CHEST WO CONTRAST     Standing Status:   Future     Standing Expiration Date:   2/3/2022     Order Specific Question:   Reason for exam:     Answer:   history of hodgkins, surveillance    CT ABDOMEN PELVIS W IV CONTRAST Additional Contrast? Radiologist Recommendation     Standing Status:   Future     Standing Expiration Date:   2/3/2022     Order Specific Question:   Additional Contrast?     Answer:   Radiologist Recommendation     Order Specific Question:   Reason for exam:     Answer:   history of hodgkins, sureveillance       Orders Placed This Encounter   Medications    valACYclovir (VALTREX) 1 g tablet     Sig: Take 1 tablet by mouth 3 times daily for 7 days     Dispense:  21 tablet     Refill:  0     Side effects, adverse effects of the medication prescribed today, as well as treatment plan/ rationale and result expectations have been discussed with the patient who expresses understanding and desires to proceed. Close follow up to evaluate treatment results and for coordination of care. I have reviewed the patient's medical history in detail and updated the computerized patient record. As always, patient is advised that if symptoms worsen in any way they will proceed to the nearest emergency room. Fu after scans. Let me know if skin lesion changes or does not continue to improve. tx for shingles based on pain with rash and characteristics.  However, if this does not improve we would consider further follow up such as biopsy    Anthony Paredes, ADRIAN - CNP

## 2021-02-07 ENCOUNTER — APPOINTMENT (OUTPATIENT)
Dept: CT IMAGING | Age: 26
End: 2021-02-07
Payer: MEDICAID

## 2021-02-07 ENCOUNTER — APPOINTMENT (OUTPATIENT)
Dept: GENERAL RADIOLOGY | Age: 26
End: 2021-02-07
Payer: MEDICAID

## 2021-02-07 ENCOUNTER — HOSPITAL ENCOUNTER (EMERGENCY)
Age: 26
Discharge: HOME | End: 2021-02-07
Payer: MEDICAID

## 2021-02-07 VITALS
SYSTOLIC BLOOD PRESSURE: 116 MMHG | BODY MASS INDEX: 18.61 KG/M2 | HEART RATE: 63 BPM | DIASTOLIC BLOOD PRESSURE: 88 MMHG | RESPIRATION RATE: 18 BRPM | TEMPERATURE: 97.7 F | HEIGHT: 63 IN | WEIGHT: 105 LBS | OXYGEN SATURATION: 100 %

## 2021-02-07 DIAGNOSIS — E86.0 DEHYDRATION: ICD-10-CM

## 2021-02-07 DIAGNOSIS — N20.0 KIDNEY STONE: Primary | ICD-10-CM

## 2021-02-07 DIAGNOSIS — R10.9 LEFT FLANK PAIN: ICD-10-CM

## 2021-02-07 LAB
ALBUMIN SERPL-MCNC: 4 G/DL (ref 3.5–4.6)
ALP BLD-CCNC: 42 U/L (ref 40–130)
ALT SERPL-CCNC: 8 U/L (ref 0–33)
ANION GAP SERPL CALCULATED.3IONS-SCNC: 8 MEQ/L (ref 9–15)
AST SERPL-CCNC: 16 U/L (ref 0–35)
BACTERIA: ABNORMAL /HPF
BASOPHILS ABSOLUTE: 0 K/UL (ref 0–0.2)
BASOPHILS RELATIVE PERCENT: 0.3 %
BILIRUB SERPL-MCNC: 0.6 MG/DL (ref 0.2–0.7)
BILIRUBIN URINE: NEGATIVE
BLOOD, URINE: ABNORMAL
BUN BLDV-MCNC: 26 MG/DL (ref 6–20)
CALCIUM SERPL-MCNC: 9.2 MG/DL (ref 8.5–9.9)
CHLORIDE BLD-SCNC: 100 MEQ/L (ref 95–107)
CLARITY: CLEAR
CO2: 24 MEQ/L (ref 20–31)
COLOR: YELLOW
CREAT SERPL-MCNC: 0.64 MG/DL (ref 0.5–0.9)
EOSINOPHILS ABSOLUTE: 0 K/UL (ref 0–0.7)
EOSINOPHILS RELATIVE PERCENT: 0.7 %
EPITHELIAL CELLS, UA: ABNORMAL /HPF (ref 0–5)
GFR AFRICAN AMERICAN: >60
GFR NON-AFRICAN AMERICAN: >60
GLOBULIN: 2.6 G/DL (ref 2.3–3.5)
GLUCOSE BLD-MCNC: 79 MG/DL (ref 70–99)
GLUCOSE URINE: NEGATIVE MG/DL
HCT VFR BLD CALC: 38.4 % (ref 37–47)
HEMOGLOBIN: 13.3 G/DL (ref 12–16)
HYALINE CASTS: ABNORMAL /HPF (ref 0–5)
KETONES, URINE: 40 MG/DL
LACTIC ACID: 0.6 MMOL/L (ref 0.5–2.2)
LEUKOCYTE ESTERASE, URINE: NEGATIVE
LIPASE: 29 U/L (ref 12–95)
LYMPHOCYTES ABSOLUTE: 2.1 K/UL (ref 1–4.8)
LYMPHOCYTES RELATIVE PERCENT: 35.7 %
MAGNESIUM: 1.8 MG/DL (ref 1.7–2.4)
MCH RBC QN AUTO: 32.4 PG (ref 27–31.3)
MCHC RBC AUTO-ENTMCNC: 34.5 % (ref 33–37)
MCV RBC AUTO: 93.7 FL (ref 82–100)
MONOCYTES ABSOLUTE: 0.3 K/UL (ref 0.2–0.8)
MONOCYTES RELATIVE PERCENT: 4.5 %
NEUTROPHILS ABSOLUTE: 3.4 K/UL (ref 1.4–6.5)
NEUTROPHILS RELATIVE PERCENT: 58.8 %
NITRITE, URINE: NEGATIVE
PDW BLD-RTO: 13 % (ref 11.5–14.5)
PH UA: 5 (ref 5–9)
PLATELET # BLD: 233 K/UL (ref 130–400)
POTASSIUM SERPL-SCNC: 3.8 MEQ/L (ref 3.4–4.9)
PROTEIN UA: NEGATIVE MG/DL
RBC # BLD: 4.1 M/UL (ref 4.2–5.4)
RBC UA: ABNORMAL /HPF (ref 0–5)
SODIUM BLD-SCNC: 132 MEQ/L (ref 135–144)
SPECIFIC GRAVITY UA: 1.03 (ref 1–1.03)
TOTAL PROTEIN: 6.6 G/DL (ref 6.3–8)
URINE REFLEX TO CULTURE: ABNORMAL
UROBILINOGEN, URINE: 0.2 E.U./DL
WBC # BLD: 5.9 K/UL (ref 4.8–10.8)
WBC UA: ABNORMAL /HPF (ref 0–5)

## 2021-02-07 PROCEDURE — 36415 COLL VENOUS BLD VENIPUNCTURE: CPT

## 2021-02-07 PROCEDURE — 83605 ASSAY OF LACTIC ACID: CPT

## 2021-02-07 PROCEDURE — 96375 TX/PRO/DX INJ NEW DRUG ADDON: CPT

## 2021-02-07 PROCEDURE — 71046 X-RAY EXAM CHEST 2 VIEWS: CPT

## 2021-02-07 PROCEDURE — 71250 CT THORAX DX C-: CPT

## 2021-02-07 PROCEDURE — 85025 COMPLETE CBC W/AUTO DIFF WBC: CPT

## 2021-02-07 PROCEDURE — 6360000002 HC RX W HCPCS: Performed by: PHYSICIAN ASSISTANT

## 2021-02-07 PROCEDURE — 81001 URINALYSIS AUTO W/SCOPE: CPT

## 2021-02-07 PROCEDURE — 83735 ASSAY OF MAGNESIUM: CPT

## 2021-02-07 PROCEDURE — 83690 ASSAY OF LIPASE: CPT

## 2021-02-07 PROCEDURE — 99284 EMERGENCY DEPT VISIT MOD MDM: CPT

## 2021-02-07 PROCEDURE — 80053 COMPREHEN METABOLIC PANEL: CPT

## 2021-02-07 PROCEDURE — 2580000003 HC RX 258: Performed by: PHYSICIAN ASSISTANT

## 2021-02-07 PROCEDURE — 96374 THER/PROPH/DIAG INJ IV PUSH: CPT

## 2021-02-07 PROCEDURE — 96361 HYDRATE IV INFUSION ADD-ON: CPT

## 2021-02-07 PROCEDURE — 74150 CT ABDOMEN W/O CONTRAST: CPT

## 2021-02-07 RX ORDER — 0.9 % SODIUM CHLORIDE 0.9 %
1000 INTRAVENOUS SOLUTION INTRAVENOUS ONCE
Status: COMPLETED | OUTPATIENT
Start: 2021-02-07 | End: 2021-02-07

## 2021-02-07 RX ORDER — ONDANSETRON 2 MG/ML
4 INJECTION INTRAMUSCULAR; INTRAVENOUS ONCE
Status: COMPLETED | OUTPATIENT
Start: 2021-02-07 | End: 2021-02-07

## 2021-02-07 RX ORDER — ETODOLAC 400 MG/1
400 TABLET, FILM COATED ORAL 2 TIMES DAILY
Qty: 14 TABLET | Refills: 0 | Status: SHIPPED | OUTPATIENT
Start: 2021-02-07 | End: 2021-02-24 | Stop reason: ALTCHOICE

## 2021-02-07 RX ORDER — ONDANSETRON 4 MG/1
4 TABLET, ORALLY DISINTEGRATING ORAL EVERY 8 HOURS PRN
Qty: 20 TABLET | Refills: 0 | Status: SHIPPED | OUTPATIENT
Start: 2021-02-07 | End: 2021-02-24 | Stop reason: ALTCHOICE

## 2021-02-07 RX ORDER — MORPHINE SULFATE 2 MG/ML
4 INJECTION, SOLUTION INTRAMUSCULAR; INTRAVENOUS ONCE
Status: COMPLETED | OUTPATIENT
Start: 2021-02-07 | End: 2021-02-07

## 2021-02-07 RX ORDER — KETOROLAC TROMETHAMINE 30 MG/ML
30 INJECTION, SOLUTION INTRAMUSCULAR; INTRAVENOUS ONCE
Status: COMPLETED | OUTPATIENT
Start: 2021-02-07 | End: 2021-02-07

## 2021-02-07 RX ORDER — HYDROCODONE BITARTRATE AND ACETAMINOPHEN 5; 325 MG/1; MG/1
1 TABLET ORAL EVERY 6 HOURS PRN
Qty: 12 TABLET | Refills: 0 | Status: SHIPPED | OUTPATIENT
Start: 2021-02-07 | End: 2021-02-10

## 2021-02-07 RX ADMIN — SODIUM CHLORIDE 1000 ML: 9 INJECTION, SOLUTION INTRAVENOUS at 17:52

## 2021-02-07 RX ADMIN — MORPHINE SULFATE 4 MG: 2 INJECTION, SOLUTION INTRAMUSCULAR; INTRAVENOUS at 17:52

## 2021-02-07 RX ADMIN — ONDANSETRON 4 MG: 2 INJECTION INTRAMUSCULAR; INTRAVENOUS at 16:38

## 2021-02-07 RX ADMIN — KETOROLAC TROMETHAMINE 30 MG: 30 INJECTION, SOLUTION INTRAMUSCULAR at 16:38

## 2021-02-07 RX ADMIN — SODIUM CHLORIDE 1000 ML: 9 INJECTION, SOLUTION INTRAVENOUS at 16:38

## 2021-02-07 ASSESSMENT — ENCOUNTER SYMPTOMS
SHORTNESS OF BREATH: 0
COLOR CHANGE: 0
ABDOMINAL PAIN: 0
APNEA: 0
BACK PAIN: 1
ALLERGIC/IMMUNOLOGIC NEGATIVE: 1
TROUBLE SWALLOWING: 0
EYE PAIN: 0

## 2021-02-07 ASSESSMENT — PAIN DESCRIPTION - LOCATION: LOCATION: FLANK

## 2021-02-07 ASSESSMENT — PAIN SCALES - GENERAL
PAINLEVEL_OUTOF10: 2
PAINLEVEL_OUTOF10: 2
PAINLEVEL_OUTOF10: 4
PAINLEVEL_OUTOF10: 7

## 2021-02-07 ASSESSMENT — PAIN DESCRIPTION - ORIENTATION: ORIENTATION: LEFT

## 2021-02-07 ASSESSMENT — PAIN DESCRIPTION - DESCRIPTORS: DESCRIPTORS: DULL

## 2021-02-07 ASSESSMENT — PAIN DESCRIPTION - PAIN TYPE: TYPE: ACUTE PAIN

## 2021-02-07 NOTE — ED TRIAGE NOTES
Patient reports left side flank pain with dysuria that started this morning. Patient reports history of kidney infections.

## 2021-02-07 NOTE — ED PROVIDER NOTES
[address] [facility] ED  eMERGENCYdEPARTMENT eNCOUnter      Pt Name: Elba Calero  MRN: [id number]  [place] 1995  Date of evaluation: 2/7/2021  Provider:Cal Chappell PA-C    CHIEF COMPLAINT       Chief Complaint   Patient presents with    Flank Pain     Left side         HISTORY OF PRESENT ILLNESS  (Location/Symptom, Timing/Onset, Context/Setting, Quality, Duration, Modifying Factors, Severity.)   Elba Calero is a 26 y.o. female who presents to the emergency department with complaints of left flank pain that began at approximately 6 AM this morning and persisted throughout the course the day. Patient states that the pain is a constant 3 out of 10 and when she feels like she needs to urinate the pain goes up to an 8 out of 10. Patient denies any nausea, vomiting or diarrhea. Patient denies any hematuria or dysuria. Patient denies any vaginal bleeding or discharge. Patient also states that last week she saw her doctor for pain in her right thoracic back region and was diagnosed with shingles without a rash. Patient was placed on valacyclovir but states that she has been taking that and the pain continues without improvement. Still no rash. No fevers or chills. HPI    Nursing Notes were reviewed and I agree. REVIEW OF SYSTEMS    (2-9 systems for level 4, 10 or more for level 5)     Review of Systems   Constitutional: Negative for diaphoresis and fever. HENT: Negative for hearing loss and trouble swallowing. Eyes: Negative for pain. Respiratory: Negative for apnea and shortness of breath. Cardiovascular: Negative for chest pain. Gastrointestinal: Negative for abdominal pain. Endocrine: Negative. Genitourinary: Positive for flank pain and urgency. Negative for hematuria. Musculoskeletal: Positive for back pain. Negative for neck pain and neck stiffness. Skin: Negative for color change. Allergic/Immunologic: Negative.     Neurological: Negative for dizziness and numbness. Hematological: Negative. Psychiatric/Behavioral: Negative. All other systems reviewed and are negative. Except as noted above the remainder of the review of systems was reviewed and negative.        PAST MEDICAL HISTORY     Past Medical History:   Diagnosis Date    Cough variant asthma 2/6/2014    Hodgkin disease ([facility] [address]     Hodgkin's         SURGICAL HISTORY       Past Surgical History:   Procedure Laterality Date    BREAST FIBROADENOMA SURGERY  01/2013    TYMPANOSTOMY TUBE PLACEMENT  1997         CURRENT MEDICATIONS       Discharge Medication List as of 2/7/2021  7:03 PM      CONTINUE these medications which have NOT CHANGED    Details   chlorhexidine (PERIDEX) 0.12 % solution swish 15 milliliters by mouth for 30 SECONDS then SPIT use twice a day after mealsHistorical Med      valACYclovir (VALTREX) 1 g tablet Take 1 tablet by mouth 3 times daily for 7 days, Disp-21 tablet, R-0Normal      ibuprofen (ADVIL;MOTRIN) 600 MG tablet Take 1 tablet by mouth every 6 hours as needed for Pain, Disp-20 tablet,R-0Print      norelgestromin-ethinyl estradiol (ORTHO EVRA) 150-35 MCG/24HR Place 1 patch onto the skin once a week, Disp-12 patch,R-3Normal      sertraline (ZOLOFT) 50 MG tablet Take 1 tablet by mouth daily, Disp-30 tablet, R-1Normal      loratadine (CLARITIN) 10 MG tablet Take 10 mg by mouth dailyHistorical Med             ALLERGIES     Penicillins    FAMILY HISTORY       Family History   Problem Relation Age of Onset    High Blood Pressure Mother     High Blood Pressure Father     High Blood Pressure Maternal Aunt     High Blood Pressure Maternal Grandmother     High Blood Pressure Maternal Grandfather     Diabetes Other         mom's side    Cancer Neg Hx     Breast Cancer Neg Hx     Prostate Cancer Neg Hx     Depression Neg Hx           SOCIAL HISTORY       Social History     Socioeconomic History    Marital status:      Spouse name: None    Number of children: None    Years of education: None    Highest education level: None   Occupational History    None   Social Needs    Financial resource strain: Not hard at all   [employer] insecurity     Worry: Never true     Inability: Never true   [employer] needs     Medical: No     Non-medical: No   Tobacco Use    Smoking status: Never Smoker    Smokeless tobacco: Never Used   Substance and Sexual Activity    Alcohol use: No    Drug use: No    Sexual activity: Yes     Partners: Male   Lifestyle    Physical activity     Days per week: None     Minutes per session: None    Stress: None   Relationships    Social connections     Talks on phone: None     Gets together: None     Attends Baptism service: None     Active member of club or organization: None     Attends meetings of clubs or organizations: None     Relationship status: None    Intimate partner violence     Fear of current or ex partner: None     Emotionally abused: None     Physically abused: None     Forced sexual activity: None   Other Topics Concern    None   Social History Narrative    None       SCREENINGS           PHYSICAL EXAM    (up to 7 forlevel 4, 8 or more for level 5)     ED Triage Vitals [02/07/21 1602]   BP Temp Temp Source Pulse Resp SpO2 Height Weight   (!) 137/92 97.7 °F (36.5 °C) Oral 83 16 99 % 5' 3\" (1.6 m) 105 lb (47.6 kg)       Physical Exam  Vitals signs and nursing note reviewed. Constitutional:       General: She is not in acute distress. Appearance: She is well-developed. She is not diaphoretic. HENT:      Head: Normocephalic and atraumatic. Mouth/Throat:      Pharynx: No oropharyngeal exudate. Eyes:      General: No scleral icterus. Conjunctiva/sclera: Conjunctivae normal.      Pupils: Pupils are equal, round, and reactive to light. Neck:      Musculoskeletal: Normal range of motion and neck supple. Trachea: No tracheal deviation. Cardiovascular:      Rate and Rhythm: Normal rate.       Heart sounds: Normal heart sounds. Pulmonary:      Effort: Pulmonary effort is normal. No respiratory distress. Breath sounds: Normal breath sounds. Abdominal:      General: Bowel sounds are normal. There is no distension. Palpations: Abdomen is soft. Tenderness: There is abdominal tenderness in the left lower quadrant. There is left CVA tenderness. There is no right CVA tenderness, guarding or rebound. Musculoskeletal: Normal range of motion. Skin:     General: Skin is warm and dry. Findings: No erythema or rash. Neurological:      Mental Status: She is alert and oriented to person, place, and time. Cranial Nerves: No cranial nerve deficit. Motor: No abnormal muscle tone. Psychiatric:         Behavior: Behavior normal.         Thought Content: Thought content normal.         Judgment: Judgment normal.           DIAGNOSTIC RESULTS     RADIOLOGY:   Non-plain film images such as CT, Ultrasound and MRI are read by the radiologist. Plain radiographic images are visualized and preliminarilyinterpreted by Mercedez Pisano PA-C with the below findings:      Interpretation per the Radiologist below, if available at the time of this note:    XR CHEST (2 VW)   Final Result   BILATERAL MIDLUNG SCARRING.  NO ACUTE CARDIOPULMONARY DISEASE IDENTIFIED      CT KIDNEY WO CONTRAST    (Results Pending)   CT CHEST WO CONTRAST    (Results Pending)       LABS:  Labs Reviewed   COMPREHENSIVE METABOLIC PANEL - Abnormal; Notable for the following components:       Result Value    Sodium 132 (*)     Anion Gap 8 (*)     BUN 26 (*)     All other components within normal limits   CBC WITH AUTO DIFFERENTIAL - Abnormal; Notable for the following components:    RBC 4.10 (*)     MCH 32.4 (*)     All other components within normal limits   URINE RT REFLEX TO CULTURE - Abnormal; Notable for the following components:    Ketones, Urine 40 (*)     Blood, Urine MODERATE (*)     All other components within normal limits MICROSCOPIC URINALYSIS - Abnormal; Notable for the following components:    Bacteria, UA RARE (*)     WBC, UA 6-9 (*)     RBC, UA 6-10 (*)     All other components within normal limits   LACTIC ACID, PLASMA   MAGNESIUM   LIPASE       All other labs were within normal range or not returnedas of this dictation. EMERGENCYDEPARTMENT COURSE and DIFFERENTIAL DIAGNOSIS/MDM:   Vitals:    Vitals:    02/07/21 1602 02/07/21 1706 02/07/21 1834   BP: (!) 137/92 115/85 116/88   Pulse: 83 68 63   Resp: 16 18 18   Temp: 97.7 °F (36.5 °C)     TempSrc: Oral     SpO2: 99% 100% 100%   Weight: 105 lb (47.6 kg)     Height: 5' 3\" (1.6 m)         REASSESSMENT        Patient presented emergency department with left flank pain, worse when having to urinate. Patient has chronic left renal stone that she has not seen urology about. Patient has chronic scarring to her lungs and a CT of the chest was obtained for quantification for family doctor follow-up. Patient was referred to urology and advised to increase hydration in order to help with pain    MDM    PROCEDURES:    Procedures      FINAL IMPRESSION      1. Kidney stone    2. Left flank pain    3. Dehydration          DISPOSITION/PLAN   DISPOSITION Decision To Discharge 02/07/2021 07:02:38 PM      PATIENT REFERRED TO:  Ignacia Watson MD  [address]  [phone]    Call in 1 day      Radha Fowler, [address] [place] [address], [address] [place] [address],[address]  [place] [phone]    Call in 2 days        DISCHARGE MEDICATIONS:  Discharge Medication List as of 2/7/2021  7:03 PM      START taking these medications    Details   HYDROcodone-acetaminophen (NORCO) 5-325 MG per tablet Take 1 tablet by mouth every 6 hours as needed for Pain for up to 3 days. Intended supply: 3 days.  Take lowest dose possible to manage pain, Disp-12 tablet, R-0Print      etodolac (LODINE) 400 MG tablet Take 1 tablet by mouth 2 times daily, Disp-14 tablet, R-0Print      ondansetron

## 2021-02-09 ENCOUNTER — HOSPITAL ENCOUNTER (OUTPATIENT)
Dept: CT IMAGING | Age: 26
Discharge: HOME | End: 2021-02-11
Payer: MEDICAID

## 2021-02-09 DIAGNOSIS — Z85.71 HISTORY OF HODGKIN'S DISEASE: ICD-10-CM

## 2021-02-24 ENCOUNTER — OFFICE VISIT (OUTPATIENT)
Dept: FAMILY MEDICINE CLINIC | Age: 26
End: 2021-02-24
Payer: MEDICAID

## 2021-02-24 VITALS
HEART RATE: 72 BPM | TEMPERATURE: 98.5 F | WEIGHT: 104 LBS | DIASTOLIC BLOOD PRESSURE: 76 MMHG | SYSTOLIC BLOOD PRESSURE: 104 MMHG | BODY MASS INDEX: 18.43 KG/M2 | HEIGHT: 63 IN | OXYGEN SATURATION: 99 %

## 2021-02-24 DIAGNOSIS — M79.2: Primary | ICD-10-CM

## 2021-02-24 DIAGNOSIS — B02.9 HERPES ZOSTER WITHOUT COMPLICATION: ICD-10-CM

## 2021-02-24 DIAGNOSIS — D22.9 SUSPICIOUS NEVUS: ICD-10-CM

## 2021-02-24 PROCEDURE — 1036F TOBACCO NON-USER: CPT | Performed by: NURSE PRACTITIONER

## 2021-02-24 PROCEDURE — G8427 DOCREV CUR MEDS BY ELIG CLIN: HCPCS | Performed by: NURSE PRACTITIONER

## 2021-02-24 PROCEDURE — G8484 FLU IMMUNIZE NO ADMIN: HCPCS | Performed by: NURSE PRACTITIONER

## 2021-02-24 PROCEDURE — G8419 CALC BMI OUT NRM PARAM NOF/U: HCPCS | Performed by: NURSE PRACTITIONER

## 2021-02-24 PROCEDURE — 99213 OFFICE O/P EST LOW 20 MIN: CPT | Performed by: NURSE PRACTITIONER

## 2021-02-24 RX ORDER — GABAPENTIN 100 MG/1
100 CAPSULE ORAL 3 TIMES DAILY
Qty: 90 CAPSULE | Refills: 1 | Status: SHIPPED | OUTPATIENT
Start: 2021-02-24 | End: 2021-08-24

## 2021-02-24 ASSESSMENT — ENCOUNTER SYMPTOMS: BACK PAIN: 1

## 2021-02-24 NOTE — PROGRESS NOTES
Subjective  Chief Complaint   Patient presents with    Herpes Zoster     check up after having shingles, states that it has worsened since she was here last.       HPI     Pt here for a fu of post-herpetic neuralgia. Pt never got the \"shingles rash\" but is dealing with pain on her right flank area that wrapping around the flank. States that it burns. Did take the valtrex I gave her.     Patient Active Problem List    Diagnosis Date Noted    Single episode of elevated blood pressure     Thrombocytopenia affecting pregnancy ([address]     Hodgkin's lymphoma ([facility] [address] in remission 06/05/2014    Cough variant asthma 02/06/2014    Anemia 08/27/2013     Past Medical History:   Diagnosis Date    Cough variant asthma 2/6/2014    Hodgkin disease ([facility] [address]     Hodgkin's     Past Surgical History:   Procedure Laterality Date    BREAST FIBROADENOMA SURGERY  01/2013    TYMPANOSTOMY TUBE PLACEMENT  1997     Family History   Problem Relation Age of Onset    High Blood Pressure Mother     High Blood Pressure Father     High Blood Pressure Maternal Aunt     High Blood Pressure Maternal Grandmother     High Blood Pressure Maternal Grandfather     Diabetes Other         mom's side    Cancer Neg Hx     Breast Cancer Neg Hx     Prostate Cancer Neg Hx     Depression Neg Hx      Social History     Socioeconomic History    Marital status:      Spouse name: None    Number of children: None    Years of education: None    Highest education level: None   Occupational History    None   Social Needs    Financial resource strain: Not hard at all   [place] insecurity     Worry: Never true     Inability: Never true    Transportation needs     Medical: No     Non-medical: No   Tobacco Use    Smoking status: Never Smoker    Smokeless tobacco: Never Used   Substance and Sexual Activity    Alcohol use: No    Drug use: No    Sexual activity: Yes     Partners: Male   Lifestyle    Physical activity     Days per week: None Minutes per session: None    Stress: None   Relationships    Social connections     Talks on phone: None     Gets together: None     Attends Alevism service: None     Active member of club or organization: None     Attends meetings of clubs or organizations: None     Relationship status: None    Intimate partner violence     Fear of current or ex partner: None     Emotionally abused: None     Physically abused: None     Forced sexual activity: None   Other Topics Concern    None   Social History Narrative    None     Current Outpatient Medications on File Prior to Visit   Medication Sig Dispense Refill    chlorhexidine (PERIDEX) 0.12 % solution swish 15 milliliters by mouth for 30 SECONDS then SPIT use twice a day after meals      norelgestromin-ethinyl estradiol (ORTHO EVRA) 150-35 MCG/24HR Place 1 patch onto the skin once a week 12 patch 3     No current facility-administered medications on file prior to visit. Allergies   Allergen Reactions    Penicillins Hives       Review of Systems   Musculoskeletal: Positive for back pain. Neurological: Negative for numbness. Objective  Vitals:    02/24/21 1255   BP: 104/76   Site: Left Upper Arm   Position: Sitting   Cuff Size: Medium Adult   Pulse: 72   Temp: 98.5 °F (36.9 °C)   TempSrc: Infrared   SpO2: 99%   Weight: 104 lb (47.2 kg)   Height: 5' 3\" (1.6 m)     Physical Exam  Vitals signs and nursing note reviewed. Constitutional:       Appearance: Normal appearance. She is normal weight. Pulmonary:      Effort: Pulmonary effort is normal.   Skin:     General: Skin is warm. Neurological:      Mental Status: She is alert. Psychiatric:         Mood and Affect: Mood normal.         Behavior: Behavior normal.         Thought Content: Thought content normal.         Judgment: Judgment normal.         Assessment & Plan     Diagnosis Orders   1. Neuralgia of right flank  gabapentin (NEURONTIN) 100 MG capsule   2.  Herpes zoster without

## 2021-03-05 ENCOUNTER — OFFICE VISIT (OUTPATIENT)
Dept: FAMILY MEDICINE CLINIC | Age: 26
End: 2021-03-05
Payer: MEDICAID

## 2021-03-05 VITALS
WEIGHT: 104 LBS | TEMPERATURE: 98.7 F | BODY MASS INDEX: 18.43 KG/M2 | OXYGEN SATURATION: 99 % | HEART RATE: 78 BPM | HEIGHT: 63 IN

## 2021-03-05 DIAGNOSIS — D49.2 ABNORMAL SKIN GROWTH: ICD-10-CM

## 2021-03-05 DIAGNOSIS — D49.2 ABNORMAL SKIN GROWTH: Primary | ICD-10-CM

## 2021-03-05 PROCEDURE — 11305 SHAVE SKIN LESION 0.5 CM/<: CPT | Performed by: FAMILY MEDICINE

## 2021-03-05 PROCEDURE — 1036F TOBACCO NON-USER: CPT | Performed by: FAMILY MEDICINE

## 2021-03-05 PROCEDURE — G8419 CALC BMI OUT NRM PARAM NOF/U: HCPCS | Performed by: FAMILY MEDICINE

## 2021-03-05 PROCEDURE — 99214 OFFICE O/P EST MOD 30 MIN: CPT | Performed by: FAMILY MEDICINE

## 2021-03-05 PROCEDURE — G8427 DOCREV CUR MEDS BY ELIG CLIN: HCPCS | Performed by: FAMILY MEDICINE

## 2021-03-05 PROCEDURE — G8484 FLU IMMUNIZE NO ADMIN: HCPCS | Performed by: FAMILY MEDICINE

## 2021-03-05 ASSESSMENT — ENCOUNTER SYMPTOMS: COLOR CHANGE: 1

## 2021-03-05 NOTE — PATIENT INSTRUCTIONS
Biopsy to ascertain if this is an early basal cell or squamous cell, versus inflamed keratosis versus benign seborrheic keratosis. Patient will check with insurance in terms of coverage for treatment if it is benign, noninflamed seborrheic keratosis. Consider liquid nitrogen cryotherapy if not a cancerous lesion, to the other lesions. Incision/ Shave biopsy/ Laceration repair    -Clean surgical area with antibacterial soap and water once daily.      -Every 3 days rinse the wound with hydrogen peroxide, let it sit for 30 seconds to a minute, and then rinse off with water. Use this to help take off any excess scab.    -Keep surgical site moist with vaseline or antibiotic ointment (single, not tripleantibiotic or Neosporin) and apply a fresh bandage daily until a solid scab forms or if the wound is at risk for trauma or dirt.     -Follow up immediately if any growing redness (minimal redness or pale pink is normal along wound edges) surrounds the surgical site or if dripping drainage occurs at surgical site. Once a solid scab forms no more bandage needed. A wet scab can look yellow. This is not infection, just moisture.  -Once the lesion is healed be sure to apply sunscreen to the area to prevent burn of the newer and more delicate skin during the first 6 months of healing.    -If the scar begins to be raised you may massage the area firmly twice a day to help break down scar tissue and help the area become a flat scar. There is some evidence that Mederma applied to a scar daily for the first few months can help shrink and fade it more quickly then without intervention.

## 2021-03-05 NOTE — PROGRESS NOTES
Diagnosis Orders   1. Abnormal skin growth  AZ SHAV SKIN LES <5MM REMAINDR BODY    Specimen to Pathology Outpatient         Orders Placed This Encounter   Procedures    Specimen to Pathology Outpatient     Margins requested on all specimens  R/O squamous cell versus seborrheic keratosis  Location right thoracic back     Standing Status:   Future     Standing Expiration Date:   3/5/2022     Order Specific Question:   PREVIOUS BIOPSY     Answer:   No     Order Specific Question:   PREOP DIAGNOSIS     Answer:   Rapidly growing abnormal skin growth     Order Specific Question:   FROZEN SECTION - NO OR YES/SPECIMEN     Answer:   No    AZ SHAV SKIN LES <5MM REMAINDR BODY     Right thoracic back       Krys Wiseman was seen today for skin exam and lesion(s). Diagnoses and all orders for this visit:    Abnormal skin growth  -     AZ SHAV SKIN LES <5MM REMAINDR BODY  -     Specimen to Pathology Outpatient; Future        Return in about 2 weeks (around 3/19/2021) for 15 min skin check. Patient Instructions   Biopsy to ascertain if this is an early basal cell or squamous cell, versus inflamed keratosis versus benign seborrheic keratosis. Patient will check with insurance in terms of coverage for treatment if it is benign, noninflamed seborrheic keratosis. Consider liquid nitrogen cryotherapy if not a cancerous lesion, to the other lesions. Incision/ Shave biopsy/ Laceration repair    -Clean surgical area with antibacterial soap and water once daily.      -Every 3 days rinse the wound with hydrogen peroxide, let it sit for 30 seconds to a minute, and then rinse off with water.   Use this to help take off any excess scab.    -Keep surgical site moist with vaseline or antibiotic ointment (single, not tripleantibiotic or Neosporin) and apply a fresh bandage daily until a solid scab forms or if the wound is at risk for trauma or dirt.     -Follow up immediately if any growing redness (minimal redness or pale pink is normal along wound edges) surrounds the surgical site or if dripping drainage occurs at surgical site. Once a solid scab forms no more bandage needed. A wet scab can look yellow. This is not infection, just moisture.  -Once the lesion is healed be sure to apply sunscreen to the area to prevent burn of the newer and more delicate skin during the first 6 months of healing.    -If the scar begins to be raised you may massage the area firmly twice a day to help break down scar tissue and help the area become a flat scar. There is some evidence that Mederma applied to a scar daily for the first few months can help shrink and fade it more quickly then without intervention. Patient is here for 2 brand-new skin growths that have come on quickly on her back. Evaluated by PCP and sent for further testing. Current Outpatient Medications on File Prior to Visit   Medication Sig Dispense Refill    gabapentin (NEURONTIN) 100 MG capsule Take 1 capsule by mouth 3 times daily for 30 days. Intended supply: 30 days 90 capsule 1    chlorhexidine (PERIDEX) 0.12 % solution swish 15 milliliters by mouth for 30 SECONDS then SPIT use twice a day after meals      norelgestromin-ethinyl estradiol (ORTHO EVRA) 150-35 MCG/24HR Place 1 patch onto the skin once a week 12 patch 3     No current facility-administered medications on file prior to visit. Penicillins    Review of Systems   Constitutional: Negative for chills and fever. Skin: Positive for color change. Negative for rash and wound. Allergic/Immunologic: Negative for environmental allergies, food allergies and immunocompromised state. Hematological: Negative for adenopathy. Does not bruise/bleed easily. Psychiatric/Behavioral: Negative for behavioral problems and sleep disturbance.        Pulse 78   Temp 98.7 °F (37.1 °C)   Ht 5' 3\" (1.6 m)   Wt 104 lb (47.2 kg)   LMP 02/10/2021 (Exact Date)   SpO2 99%   BMI 18.42 kg/m²   Physical Exam  Constitutional:       General: She is not in acute distress. Appearance: Normal appearance. She is well-developed. She is not toxic-appearing. HENT:      Head: Normocephalic and atraumatic. Right Ear: Hearing and tympanic membrane normal.      Left Ear: Hearing and tympanic membrane normal.      Nose: Nose normal. No nasal deformity. Eyes:      General: Lids are normal.         Right eye: No discharge. Left eye: No discharge. Conjunctiva/sclera: Conjunctivae normal.      Pupils: Pupils are equal, round, and reactive to light. Neck:      Musculoskeletal: Full passive range of motion without pain. Thyroid: No thyroid mass or thyromegaly. Vascular: No JVD. Trachea: Trachea and phonation normal.   Cardiovascular:      Rate and Rhythm: Normal rate and regular rhythm. Pulmonary:      Effort: No accessory muscle usage or respiratory distress. Musculoskeletal:      Comments: FROM all large muscle groups and joints as witnessed when walking to exam table, getting on, and getting off the exam table. Skin:     General: Skin is warm and dry. Findings: No rash. Comments: Multiple nevi in the back. 2 areas are significantly raised with irregular borders mostly uniform brown color the one in the right thoracic area also has an outpouching of pearly discolored skin. Refer  to picture below   Neurological:      Mental Status: She is alert. Motor: No tremor or atrophy. Gait: Gait normal.   Psychiatric:         Speech: Speech normal.         Behavior: Behavior normal.         Thought Content: Thought content normal.               PROCEDURE:  Informed consent was given by the patient. Risks including infection, bleeding and scarring were discussed. No guarantee how the scar will look. Patient skin was prepped with alcohol. Wound was anesthetized using 0.2 cc of 1% lidocaine with epinephrine for anesthesia.   A Dermablade was used to obtain the complete removal of the visible lesion. Drysol was used at the base of site. Bacitracin ointment and bandage were placed on the wound. Estimated blood loss less than 1 cc    Post op wound care instructions were given to the patient. He/She will f/u in 2 weeks or sooner if needed for problems. Martha Vega was seen today for skin exam and lesion(s). Diagnoses and all orders for this visit:    Abnormal skin growth  -     [place] SHAV SKIN LES <5MM REMAINDR BODY  -     Specimen to Pathology Outpatient; Future        Return in about 2 weeks (around 3/19/2021) for 15 min skin check. Patient Instructions   Biopsy to ascertain if this is an early basal cell or squamous cell, versus inflamed keratosis versus benign seborrheic keratosis. Patient will check with insurance in terms of coverage for treatment if it is benign, noninflamed seborrheic keratosis. Consider liquid nitrogen cryotherapy if not a cancerous lesion, to the other lesions. Incision/ Shave biopsy/ Laceration repair    -Clean surgical area with antibacterial soap and water once daily.      -Every 3 days rinse the wound with hydrogen peroxide, let it sit for 30 seconds to a minute, and then rinse off with water. Use this to help take off any excess scab.    -Keep surgical site moist with vaseline or antibiotic ointment (single, not tripleantibiotic or Neosporin) and apply a fresh bandage daily until a solid scab forms or if the wound is at risk for trauma or dirt.     -Follow up immediately if any growing redness (minimal redness or pale pink is normal along wound edges) surrounds the surgical site or if dripping drainage occurs at surgical site. Once a solid scab forms no more bandage needed. A wet scab can look yellow.   This is not infection, just moisture.  -Once the lesion is healed be sure to apply sunscreen to the area to prevent burn of the newer and more delicate skin during the first 6 months of healing.    -If the scar begins to be raised you may

## 2021-04-19 ENCOUNTER — HOSPITAL ENCOUNTER (EMERGENCY)
Age: 26
Discharge: HOME | End: 2021-04-19
Attending: EMERGENCY MEDICINE
Payer: MEDICAID

## 2021-04-19 ENCOUNTER — APPOINTMENT (OUTPATIENT)
Dept: CT IMAGING | Age: 26
End: 2021-04-19
Payer: MEDICAID

## 2021-04-19 VITALS
OXYGEN SATURATION: 99 % | SYSTOLIC BLOOD PRESSURE: 126 MMHG | HEART RATE: 76 BPM | RESPIRATION RATE: 16 BRPM | DIASTOLIC BLOOD PRESSURE: 94 MMHG | BODY MASS INDEX: 18.42 KG/M2 | WEIGHT: 104 LBS

## 2021-04-19 DIAGNOSIS — N20.1 URETEROLITHIASIS: Primary | ICD-10-CM

## 2021-04-19 LAB
ALBUMIN SERPL-MCNC: 4.3 G/DL (ref 3.5–4.6)
ALP BLD-CCNC: 50 U/L (ref 40–130)
ALT SERPL-CCNC: 9 U/L (ref 0–33)
ANION GAP SERPL CALCULATED.3IONS-SCNC: 10 MEQ/L (ref 9–15)
AST SERPL-CCNC: 17 U/L (ref 0–35)
BACTERIA: ABNORMAL /HPF
BASOPHILS ABSOLUTE: 0 K/UL (ref 0–0.2)
BASOPHILS RELATIVE PERCENT: 0.6 %
BILIRUB SERPL-MCNC: 0.4 MG/DL (ref 0.2–0.7)
BILIRUBIN URINE: NEGATIVE
BLOOD, URINE: ABNORMAL
BUN BLDV-MCNC: 27 MG/DL (ref 6–20)
CALCIUM SERPL-MCNC: 8.9 MG/DL (ref 8.5–9.9)
CHLORIDE BLD-SCNC: 99 MEQ/L (ref 95–107)
CLARITY: CLEAR
CO2: 26 MEQ/L (ref 20–31)
COLOR: YELLOW
CREAT SERPL-MCNC: 0.68 MG/DL (ref 0.5–0.9)
EOSINOPHILS ABSOLUTE: 0.1 K/UL (ref 0–0.7)
EOSINOPHILS RELATIVE PERCENT: 0.9 %
EPITHELIAL CELLS, UA: ABNORMAL /HPF (ref 0–5)
GFR AFRICAN AMERICAN: >60
GFR NON-AFRICAN AMERICAN: >60
GLOBULIN: 2 G/DL (ref 2.3–3.5)
GLUCOSE BLD-MCNC: 84 MG/DL (ref 70–99)
GLUCOSE URINE: NEGATIVE MG/DL
HCG, URINE, POC: NEGATIVE
HCT VFR BLD CALC: 37.2 % (ref 37–47)
HEMOGLOBIN: 12.7 G/DL (ref 12–16)
HYALINE CASTS: ABNORMAL /HPF (ref 0–5)
KETONES, URINE: 15 MG/DL
LEUKOCYTE ESTERASE, URINE: NEGATIVE
LYMPHOCYTES ABSOLUTE: 2.7 K/UL (ref 1–4.8)
LYMPHOCYTES RELATIVE PERCENT: 48.6 %
Lab: 3399
MCH RBC QN AUTO: 32.4 PG (ref 27–31.3)
MCHC RBC AUTO-ENTMCNC: 34.2 % (ref 33–37)
MCV RBC AUTO: 94.7 FL (ref 82–100)
MONOCYTES ABSOLUTE: 0.3 K/UL (ref 0.2–0.8)
MONOCYTES RELATIVE PERCENT: 6.1 %
NEGATIVE QC PASS/FAIL: NORMAL
NEUTROPHILS ABSOLUTE: 2.4 K/UL (ref 1.4–6.5)
NEUTROPHILS RELATIVE PERCENT: 43.8 %
NITRITE, URINE: NEGATIVE
PDW BLD-RTO: 12.7 % (ref 11.5–14.5)
PH UA: 5 (ref 5–9)
PLATELET # BLD: 188 K/UL (ref 130–400)
POSITIVE QC PASS/FAIL: NORMAL
POTASSIUM SERPL-SCNC: 3.5 MEQ/L (ref 3.4–4.9)
PROTEIN UA: 30 MG/DL
RBC # BLD: 3.93 M/UL (ref 4.2–5.4)
RBC UA: ABNORMAL /HPF (ref 0–5)
SODIUM BLD-SCNC: 135 MEQ/L (ref 135–144)
SPECIFIC GRAVITY UA: 1.03 (ref 1–1.03)
TOTAL PROTEIN: 6.3 G/DL (ref 6.3–8)
URINE REFLEX TO CULTURE: ABNORMAL
UROBILINOGEN, URINE: 0.2 E.U./DL
WBC # BLD: 5.6 K/UL (ref 4.8–10.8)
WBC UA: ABNORMAL /HPF (ref 0–5)

## 2021-04-19 PROCEDURE — 85025 COMPLETE CBC W/AUTO DIFF WBC: CPT

## 2021-04-19 PROCEDURE — 6360000002 HC RX W HCPCS: Performed by: EMERGENCY MEDICINE

## 2021-04-19 PROCEDURE — 6370000000 HC RX 637 (ALT 250 FOR IP): Performed by: EMERGENCY MEDICINE

## 2021-04-19 PROCEDURE — 81001 URINALYSIS AUTO W/SCOPE: CPT

## 2021-04-19 PROCEDURE — 36415 COLL VENOUS BLD VENIPUNCTURE: CPT

## 2021-04-19 PROCEDURE — 80053 COMPREHEN METABOLIC PANEL: CPT

## 2021-04-19 PROCEDURE — 74176 CT ABD & PELVIS W/O CONTRAST: CPT

## 2021-04-19 PROCEDURE — 96374 THER/PROPH/DIAG INJ IV PUSH: CPT

## 2021-04-19 PROCEDURE — 99283 EMERGENCY DEPT VISIT LOW MDM: CPT

## 2021-04-19 RX ORDER — KETOROLAC TROMETHAMINE 30 MG/ML
30 INJECTION, SOLUTION INTRAMUSCULAR; INTRAVENOUS ONCE
Status: COMPLETED | OUTPATIENT
Start: 2021-04-19 | End: 2021-04-19

## 2021-04-19 RX ORDER — OXYCODONE HYDROCHLORIDE AND ACETAMINOPHEN 5; 325 MG/1; MG/1
1-2 TABLET ORAL EVERY 4 HOURS PRN
Qty: 15 TABLET | Refills: 0 | Status: SHIPPED | OUTPATIENT
Start: 2021-04-19 | End: 2021-04-22

## 2021-04-19 RX ORDER — TAMSULOSIN HYDROCHLORIDE 0.4 MG/1
0.4 CAPSULE ORAL ONCE
Status: COMPLETED | OUTPATIENT
Start: 2021-04-19 | End: 2021-04-19

## 2021-04-19 RX ORDER — ONDANSETRON 4 MG/1
4 TABLET, ORALLY DISINTEGRATING ORAL EVERY 8 HOURS PRN
Qty: 20 TABLET | Refills: 0 | Status: SHIPPED | OUTPATIENT
Start: 2021-04-19 | End: 2021-08-24

## 2021-04-19 RX ORDER — TAMSULOSIN HYDROCHLORIDE 0.4 MG/1
0.4 CAPSULE ORAL DAILY
Qty: 10 CAPSULE | Refills: 3 | Status: SHIPPED | OUTPATIENT
Start: 2021-04-19 | End: 2021-08-24

## 2021-04-19 RX ADMIN — KETOROLAC TROMETHAMINE 30 MG: 30 INJECTION, SOLUTION INTRAMUSCULAR; INTRAVENOUS at 19:17

## 2021-04-19 RX ADMIN — TAMSULOSIN HYDROCHLORIDE 0.4 MG: 0.4 CAPSULE ORAL at 20:07

## 2021-04-19 ASSESSMENT — PAIN DESCRIPTION - LOCATION: LOCATION: FLANK

## 2021-04-19 ASSESSMENT — ENCOUNTER SYMPTOMS
EYE PAIN: 0
CHEST TIGHTNESS: 0
VOMITING: 0
SORE THROAT: 0
SHORTNESS OF BREATH: 0
ABDOMINAL PAIN: 0
NAUSEA: 0

## 2021-04-19 ASSESSMENT — PAIN SCALES - GENERAL: PAINLEVEL_OUTOF10: 4

## 2021-04-19 NOTE — ED PROVIDER NOTES
Surgical History:   Procedure Laterality Date    BREAST FIBROADENOMA SURGERY  01/2013    TYMPANOSTOMY TUBE PLACEMENT  1997         CURRENT MEDICATIONS       Previous Medications    CHLORHEXIDINE (PERIDEX) 0.12 % SOLUTION    swish 15 milliliters by mouth for 30 SECONDS then SPIT use twice a day after meals    GABAPENTIN (NEURONTIN) 100 MG CAPSULE    Take 1 capsule by mouth 3 times daily for 30 days.  Intended supply: 30 days    NORELGESTROMIN-ETHINYL ESTRADIOL (ORTHO EVRA) 150-35 MCG/24HR    Place 1 patch onto the skin once a week       ALLERGIES     Penicillins    FAMILY HISTORY       Family History   Problem Relation Age of Onset    High Blood Pressure Mother     High Blood Pressure Father     High Blood Pressure Maternal Aunt     High Blood Pressure Maternal Grandmother     High Blood Pressure Maternal Grandfather     Diabetes Other         mom's side    Cancer Neg Hx     Breast Cancer Neg Hx     Prostate Cancer Neg Hx     Depression Neg Hx           SOCIAL HISTORY       Social History     Socioeconomic History    Marital status:      Spouse name: Not on file    Number of children: Not on file    Years of education: Not on file    Highest education level: Not on file   Occupational History    Not on file   Social Needs    Financial resource strain: Not hard at all   BarEye insecurity     Worry: Never true     Inability: Never true   Limbo needs     Medical: No     Non-medical: No   Tobacco Use    Smoking status: Never Smoker    Smokeless tobacco: Never Used   Substance and Sexual Activity    Alcohol use: No    Drug use: No    Sexual activity: Yes     Partners: Male   Lifestyle    Physical activity     Days per week: Not on file     Minutes per session: Not on file    Stress: Not on file   Relationships    Social connections     Talks on phone: Not on file     Gets together: Not on file     Attends [employer] service: Not on file     Active member of club or organization: Not on file     Attends meetings of clubs or organizations: Not on file     Relationship status: Not on file    Intimate partner violence     Fear of current or ex partner: Not on file     Emotionally abused: Not on file     Physically abused: Not on file     Forced sexual activity: Not on file   Other Topics Concern    Not on file   Social History Narrative    Not on file       SCREENINGS                        PHYSICAL EXAM    (up to 7 for level 4, 8 or more for level 5)     ED Triage Vitals [04/19/21 1827]   BP Temp Temp src Pulse Resp SpO2 Height Weight   (!) 126/94 -- -- 76 16 99 % -- 104 lb (47.2 kg)       Physical Exam  Vitals signs and nursing note reviewed. Constitutional:       General: She is not in acute distress. Appearance: She is well-developed. She is not ill-appearing or diaphoretic. HENT:      Head: Normocephalic and atraumatic. Right Ear: External ear normal.      Left Ear: External ear normal.      Mouth/Throat:      Pharynx: No oropharyngeal exudate. Eyes:      Conjunctiva/sclera: Conjunctivae normal.      Pupils: Pupils are equal, round, and reactive to light. Neck:      Musculoskeletal: Normal range of motion and neck supple. Thyroid: No thyromegaly. Vascular: No JVD. Trachea: No tracheal deviation. Cardiovascular:      Rate and Rhythm: Normal rate. Heart sounds: Normal heart sounds. No murmur. Pulmonary:      Effort: Pulmonary effort is normal. No respiratory distress. Breath sounds: Normal breath sounds. No wheezing. Abdominal:      General: Bowel sounds are normal.      Palpations: Abdomen is soft. Tenderness: There is no abdominal tenderness. There is no guarding. Comments: Left flank pain   Musculoskeletal: Normal range of motion. Skin:     General: Skin is warm and dry. Findings: No rash. Neurological:      Mental Status: She is alert and oriented to person, place, and time.       Cranial Nerves: No cranial nerve deficit. Psychiatric:         Behavior: Behavior normal.         DIAGNOSTIC RESULTS     EKG: All EKG's are interpreted by the Emergency Department Physician who either signs or Co-signs this chart in the absence of a cardiologist.        RADIOLOGY:   Non-plain film images such as CT, Ultrasound and MRI are read by the radiologist. Plain radiographic images are visualized and preliminarily interpreted by the emergency physician with the below findings:        Interpretation per the Radiologist below, if available at the time of this note:    CT ABDOMEN PELVIS WO CONTRAST Additional Contrast? None   Final Result        Mild left-sided hydroureteronephrosis secondary to a 4 mm calculus at the left UPJ    ED BEDSIDE ULTRASOUND:   Performed by ED Physician - none    LABS:  Labs Reviewed   URINE RT REFLEX TO CULTURE - Abnormal; Notable for the following components:       Result Value    Ketones, Urine 15 (*)     Blood, Urine LARGE (*)     Protein, UA 30 (*)     All other components within normal limits   CBC WITH AUTO DIFFERENTIAL - Abnormal; Notable for the following components:    RBC 3.93 (*)     MCH 32.4 (*)     All other components within normal limits   COMPREHENSIVE METABOLIC PANEL - Abnormal; Notable for the following components:    BUN 27 (*)     Globulin 2.0 (*)     All other components within normal limits   MICROSCOPIC URINALYSIS - Abnormal; Notable for the following components:    Bacteria, UA RARE (*)     RBC, UA  (*)     All other components within normal limits   POC PREGNANCY UR-QUAL       All other labs were within normal range or not returned as of this dictation. EMERGENCY DEPARTMENT COURSE and DIFFERENTIAL DIAGNOSIS/MDM:   Vitals:    Vitals:    04/19/21 1827   BP: (!) 126/94   Pulse: 76   Resp: 16   SpO2: 99%   Weight: 104 lb (47.2 kg)       Patient has 4 mm ureteral stone. We will put her on Flomax Percocet and Zofran. Referral to urology.     MDM      REASSESSMENT          CRITICAL CARE TIME   Total Critical Care time was 0 minutes, excluding separately reportable procedures. There was a high probability of clinically significant/life threatening deterioration in the patient's condition which required my urgent intervention. CONSULTS:  None    PROCEDURES:  Unless otherwise noted below, none     Procedures        FINAL IMPRESSION      1. Ureterolithiasis          DISPOSITION/PLAN   DISPOSITION        PATIENT REFERRED TO:  Casey Alatorre MD  [address] [place] [address] [facility] [address] [phone]    Schedule an appointment as soon as possible for a visit         DISCHARGE MEDICATIONS:  New Prescriptions    ONDANSETRON (ZOFRAN ODT) 4 MG DISINTEGRATING TABLET    Take 1 tablet by mouth every 8 hours as needed for Nausea    OXYCODONE-ACETAMINOPHEN (PERCOCET) 5-325 MG PER TABLET    Take 1-2 tablets by mouth every 4 hours as needed for Pain for up to 3 days. TAMSULOSIN (FLOMAX) 0.4 MG CAPSULE    Take 1 capsule by mouth daily for 10 doses     Controlled Substances Monitoring:     No flowsheet data found.     (Please note that portions of this note were completed with a voice recognition program.  Efforts were made to edit the dictations but occasionally words are mis-transcribed.)    Edel Perez DO (electronically signed)  Attending Emergency Physician           Edel Perez DO  04/19/21 [facility],   04/19/21 [id number]

## 2021-05-28 ENCOUNTER — NURSE TRIAGE (OUTPATIENT)
Dept: OTHER | Facility: CLINIC | Age: 26
End: 2021-05-28

## 2021-05-28 NOTE — TELEPHONE ENCOUNTER
Reason for Disposition   Earache    Answer Assessment - Initial Assessment Questions  1. LOCATION: \"Where does it hurt? \"       Forehead    2. ONSET: \"When did the sinus pain start? \"  (e.g., hours, days)      4 days    3. SEVERITY: \"How bad is the pain? \"   (Scale 1-10; mild, moderate or severe)    - MILD (1-3): doesn't interfere with normal activities     - MODERATE (4-7): interferes with normal activities (e.g., work or school) or awakens from sleep    - SEVERE (8-10): excruciating pain and patient unable to do any normal activities         Mild    4. RECURRENT SYMPTOM: \"Have you ever had sinus problems before? \" If so, ask: \"When was the last time? \" and \"What happened that time? \"       Yes    5. NASAL CONGESTION: \"Is the nose blocked? \" If so, ask, \"Can you open it or must you breathe through the mouth? \"      Green/yellow nasal drainage. Nose blocked. 6. NASAL DISCHARGE: \"Do you have discharge from your nose? \" If so ask, \"What color? \"      Green/yellow    7. FEVER: \"Do you have a fever? \" If so, ask: \"What is it, how was it measured, and when did it start? \"      No    8. OTHER SYMPTOMS: \"Do you have any other symptoms? \" (e.g., sore throat, cough, earache, difficulty breathing)      AM cough (productive--white, sometimes green), sore throat, Right ear pain (no drainage)    9. PREGNANCY: \"Is there any chance you are pregnant? \" \"When was your last menstrual period? \"      No-on BCP    Protocols used: SINUS PAIN OR CONGESTION-ADULT-[place]    [facility] [employer] [facility]. Brief description of triage: \"sinus congestion\" forehead pain, right ear pain AM cough x 4 days despite OTCs. Green sputum and nasal drainage, afebrile. Triage indicates for patient to be seen by office today. VV. Pt agrees. Care advice provided, patient verbalizes understanding; denies any other questions or concerns; instructed to call back for any new or worsening symptoms.     Writer provided warm transfer to Serge Kimbrough at [facility] for appointment scheduling. Attention Provider: Thank you for allowing me to participate in the care of your patient. The patient was connected to triage in response to information provided to the ECC. Please do not respond through this encounter as the response is not directed to a shared pool.

## 2021-08-23 NOTE — PROGRESS NOTES
2021    Mirza Raymond (:  1995) is a 26 y.o. female, here for evaluation of the following medical concerns:  Chief Complaint   Patient presents with    Skin Problem     Lymph node swelling, right arm pit. States last week her left arm pit lymph node was swollen but has went away now.  Fatigue     HPI  LN swelling  2 weeks ago had swelling in the left armpit  Went away in a few days  A few days ago right sided swelling  Is improving in size  No drainage or redness  No prior symptoms     Hx hodgkin's lymphoma - dx  (night sweats, loss of appetite, fatigue)  Did 5-6 months of chemotherapy  D/c due to pseudomonas infection  PET scan clear  Did follow up with heme/onc for one year  Lost her insurance so did not follow up  Did have CT abd/pevlis done in the ER      Does endorse increased fatigue  Denied night sweats, unintentional weight loss    Review of Systems   Constitutional: Negative for chills and fever. HENT: Negative for congestion, sinus pressure and sore throat. Respiratory: Negative for cough and shortness of breath. Cardiovascular: Negative for chest pain and palpitations. Gastrointestinal: Negative for abdominal pain and vomiting. Musculoskeletal: Negative for arthralgias and myalgias. Skin: Negative for rash and wound. Mass right armpit   Neurological: Negative for speech difficulty and light-headedness. Psychiatric/Behavioral: Negative for suicidal ideas. The patient is not nervous/anxious. Prior to Visit Medications    Medication Sig Taking?  Authorizing Provider   ZAFEMY 150-35 MCG/24HR apply 1 patch and replace weekly for 3 weeks Yes Jose Collazo MD        Medications Discontinued During This Encounter   Medication Reason    chlorhexidine (PERIDEX) 0.12 % solution Patient Choice    gabapentin (NEURONTIN) 100 MG capsule Patient Choice    ondansetron (ZOFRAN ODT) 4 MG disintegrating tablet Patient Choice    tamsulosin (FLOMAX) 0.4 MG capsule Patient Choice       Allergies   Allergen Reactions    Penicillins Hives       Past Medical History:   Diagnosis Date    Cough variant asthma 2/6/2014    Hodgkin disease ([facility] [address]     Hodgkin's       Past Surgical History:   Procedure Laterality Date    BREAST FIBROADENOMA SURGERY  01/2013    TYMPANOSTOMY TUBE PLACEMENT  1997       Social History     Socioeconomic History    Marital status:      Spouse name: Not on file    Number of children: Not on file    Years of education: Not on file    Highest education level: Not on file   Occupational History    Not on file   Tobacco Use    Smoking status: Never Smoker    Smokeless tobacco: Never Used   Vaping Use    Vaping Use: Never used   Substance and Sexual Activity    Alcohol use: No    Drug use: No    Sexual activity: Yes     Partners: Male   Other Topics Concern    Not on file   Social History Narrative    Not on file     Social Determinants of Health     Financial Resource Strain: Low Risk     Difficulty of Paying Living Expenses: Not hard at all   Food Insecurity: No Food Insecurity    Worried About Running Out of Food in the Last Year: Never true    Bridget of Food in the Last Year: Never true   Transportation Needs:     Lack of Transportation (Medical):      Lack of Transportation (Non-Medical):    Physical Activity:     Days of Exercise per Week:     Minutes of Exercise per Session:    Stress:     Feeling of Stress :    Social Connections:     Frequency of Communication with Friends and Family:     Frequency of Social Gatherings with Friends and Family:     Attends [employer]ism Services:     Active Member of Clubs or Organizations:     Attends Club or Organization Meetings:     Marital Status:    Intimate Partner Violence:     Fear of Current or Ex-Partner:     Emotionally Abused:     Physically Abused:     Sexually Abused:         Family History   Problem Relation Age of Onset    High Blood Pressure Mother     High Blood Pressure Father     High Blood Pressure Maternal Aunt     High Blood Pressure Maternal Grandmother     High Blood Pressure Maternal Grandfather     Diabetes Other         mom's side    Cancer Neg Hx     Breast Cancer Neg Hx     Prostate Cancer Neg Hx     Depression Neg Hx        Vitals:    08/24/21 1150   Pulse: 84   Temp: 96.7 °F (35.9 °C)   SpO2: 99%   Weight: 102 lb (46.3 kg)   Height: 5' 3\" (1.6 m)       Estimated body mass index is 18.07 kg/m² as calculated from the following:    Height as of this encounter: 5' 3\" (1.6 m). Weight as of this encounter: 102 lb (46.3 kg). No results for input(s): WBC, RBC, HGB, HCT, MCV, MCH, MCHC, RDW, PLT, MPV in the last 72 hours. No results for input(s): NA, K, CL, CO2, BUN, CREATININE, GLUCOSE, CALCIUM, PROT, LABALBU, BILITOT, ALKPHOS, AST, ALT in the last 72 hours. No results found for: LABA1C    No results found. Physical Exam  Constitutional:       General: She is not in acute distress. Appearance: Normal appearance. HENT:      Head: Normocephalic and atraumatic. Eyes:      Extraocular Movements: Extraocular movements intact. Conjunctiva/sclera: Conjunctivae normal.   Musculoskeletal:         General: No deformity. Normal range of motion. Skin:     Findings: No lesion or rash. Comments: 1 cm x 1 cm firm rubbery lesion right axilla without any overlying skin changes   Neurological:      General: No focal deficit present. Mental Status: She is alert. Mental status is at baseline. Psychiatric:         Mood and Affect: Mood normal.         Behavior: Behavior normal.         Thought Content: Thought content normal.         ASSESSMENT/PLAN:  1.  Lymph node enlargement  -Based on palpation lesion does appear to be an enlarged lymph node, she did have one in her left armpit a few weeks ago and now this 1 appeared a few days ago  -We will get ultrasound to better characterize swelling and see the size of the lymph node swelling  -Due to fatigue we will also get CBC and vitamin D levels  -She does have a history of Hodgkin's lymphoma diagnosed in 2014 and treated with 6 months of chemotherapy  -She follow-up with heme-onc for 1 year but then was lost to follow-up due to losing her insurance  -Recent CT abdomen pelvis was normal 4/2021, CT chest wo contrast 2/2021 wnl    - CBC With Auto Differential; Future  - Vitamin D 25 Hydroxy; Future  - US SOFT TISSUE LIMITED AREA; Future    2. Personal history of Hodgkin lymphoma  - CBC With Auto Differential; Future  - Vitamin D 25 Hydroxy; Future  - US SOFT TISSUE LIMITED AREA; Future      ---------------------------------------------------------------------  Side effects, adverse effects of the medication prescribed today, as well as treatment plan/ rationale and result expectations have been discussed with the patient who expresses understanding and desires to proceed. Close follow up to evaluate treatment results and for coordination of care. I have reviewed the patient's medical history in detail and updated the computerized patient record. As always, patient is advised that if symptoms worsen in any way they will proceed to the nearest emergency room. --------------------------------------------------------------------    Return if symptoms worsen or fail to improve. An  electronic signature was used to authenticate this note.     --Christian Wisdom, DO on 8/24/2021 at 12:11 PM

## 2021-08-24 ENCOUNTER — OFFICE VISIT (OUTPATIENT)
Dept: FAMILY MEDICINE CLINIC | Age: 26
End: 2021-08-24
Payer: COMMERCIAL

## 2021-08-24 VITALS
TEMPERATURE: 96.7 F | WEIGHT: 102 LBS | BODY MASS INDEX: 18.07 KG/M2 | HEIGHT: 63 IN | OXYGEN SATURATION: 99 % | HEART RATE: 84 BPM

## 2021-08-24 DIAGNOSIS — Z85.71 PERSONAL HISTORY OF HODGKIN LYMPHOMA: ICD-10-CM

## 2021-08-24 DIAGNOSIS — R59.9 LYMPH NODE ENLARGEMENT: Primary | ICD-10-CM

## 2021-08-24 PROCEDURE — 99213 OFFICE O/P EST LOW 20 MIN: CPT | Performed by: STUDENT IN AN ORGANIZED HEALTH CARE EDUCATION/TRAINING PROGRAM

## 2021-08-24 PROCEDURE — G8419 CALC BMI OUT NRM PARAM NOF/U: HCPCS | Performed by: STUDENT IN AN ORGANIZED HEALTH CARE EDUCATION/TRAINING PROGRAM

## 2021-08-24 PROCEDURE — 1036F TOBACCO NON-USER: CPT | Performed by: STUDENT IN AN ORGANIZED HEALTH CARE EDUCATION/TRAINING PROGRAM

## 2021-08-24 PROCEDURE — G8427 DOCREV CUR MEDS BY ELIG CLIN: HCPCS | Performed by: STUDENT IN AN ORGANIZED HEALTH CARE EDUCATION/TRAINING PROGRAM

## 2021-08-24 SDOH — ECONOMIC STABILITY: FOOD INSECURITY: WITHIN THE PAST 12 MONTHS, YOU WORRIED THAT YOUR FOOD WOULD RUN OUT BEFORE YOU GOT MONEY TO BUY MORE.: NEVER TRUE

## 2021-08-24 SDOH — ECONOMIC STABILITY: FOOD INSECURITY: WITHIN THE PAST 12 MONTHS, THE FOOD YOU BOUGHT JUST DIDN'T LAST AND YOU DIDN'T HAVE MONEY TO GET MORE.: NEVER TRUE

## 2021-08-24 ASSESSMENT — ENCOUNTER SYMPTOMS
SORE THROAT: 0
ABDOMINAL PAIN: 0
SINUS PRESSURE: 0
COUGH: 0
VOMITING: 0
SHORTNESS OF BREATH: 0

## 2021-08-25 ENCOUNTER — HOSPITAL ENCOUNTER (OUTPATIENT)
Dept: ULTRASOUND IMAGING | Age: 26
Discharge: HOME | End: 2021-08-27
Payer: COMMERCIAL

## 2021-08-25 DIAGNOSIS — Z85.71 PERSONAL HISTORY OF HODGKIN LYMPHOMA: ICD-10-CM

## 2021-08-25 DIAGNOSIS — R59.9 LYMPH NODE ENLARGEMENT: ICD-10-CM

## 2021-08-25 PROCEDURE — 76999 ECHO EXAMINATION PROCEDURE: CPT

## 2021-08-30 DIAGNOSIS — R59.9 LYMPH NODE ENLARGEMENT: Primary | ICD-10-CM

## 2021-08-30 DIAGNOSIS — Z85.71 PERSONAL HISTORY OF HODGKIN LYMPHOMA: ICD-10-CM

## 2021-08-30 NOTE — RESULT ENCOUNTER NOTE
Please let patient know her ultrasound did confirm an enlarged lymph node. Her blood count looked okay however I would like her to follow-up with hematology/oncology to make sure no further work-up is necessary at this time.   I did send a referral.  Thank you

## 2021-10-13 ENCOUNTER — OFFICE VISIT (OUTPATIENT)
Dept: FAMILY MEDICINE CLINIC | Age: 26
End: 2021-10-13
Payer: COMMERCIAL

## 2021-10-13 VITALS
OXYGEN SATURATION: 98 % | WEIGHT: 105 LBS | SYSTOLIC BLOOD PRESSURE: 118 MMHG | BODY MASS INDEX: 18.61 KG/M2 | HEIGHT: 63 IN | TEMPERATURE: 96.4 F | DIASTOLIC BLOOD PRESSURE: 64 MMHG | HEART RATE: 118 BPM

## 2021-10-13 DIAGNOSIS — B34.9 VIRAL ILLNESS: Primary | ICD-10-CM

## 2021-10-13 LAB
INFLUENZA A ANTIBODY: NEGATIVE
INFLUENZA B ANTIBODY: NEGATIVE
Lab: NORMAL
PERFORMING INSTRUMENT: NORMAL
QC PASS/FAIL: NORMAL
SARS-COV-2, POC: NORMAL

## 2021-10-13 PROCEDURE — 1036F TOBACCO NON-USER: CPT

## 2021-10-13 PROCEDURE — 87804 INFLUENZA ASSAY W/OPTIC: CPT

## 2021-10-13 PROCEDURE — G8420 CALC BMI NORM PARAMETERS: HCPCS

## 2021-10-13 PROCEDURE — 87426 SARSCOV CORONAVIRUS AG IA: CPT

## 2021-10-13 PROCEDURE — G8427 DOCREV CUR MEDS BY ELIG CLIN: HCPCS

## 2021-10-13 PROCEDURE — G8484 FLU IMMUNIZE NO ADMIN: HCPCS

## 2021-10-13 PROCEDURE — 99213 OFFICE O/P EST LOW 20 MIN: CPT

## 2021-10-13 RX ORDER — PSEUDOEPHEDRINE HYDROCHLORIDE 30 MG/1
TABLET ORAL
Qty: 28 TABLET | Refills: 0 | Status: SHIPPED | OUTPATIENT
Start: 2021-10-13 | End: 2021-12-21 | Stop reason: ALTCHOICE

## 2021-10-13 ASSESSMENT — ENCOUNTER SYMPTOMS
TROUBLE SWALLOWING: 0
VOMITING: 0
APNEA: 0
SINUS PAIN: 0
FACIAL SWELLING: 0
SORE THROAT: 1
EYE PAIN: 0
NAUSEA: 0
RHINORRHEA: 0
SINUS PRESSURE: 1
COLOR CHANGE: 0
DIARRHEA: 0
WHEEZING: 0
EYE ITCHING: 0
CHEST TIGHTNESS: 0
ABDOMINAL PAIN: 0
COUGH: 1
EYE DISCHARGE: 0
BACK PAIN: 0
SHORTNESS OF BREATH: 0

## 2021-10-13 NOTE — PROGRESS NOTES
[address] Encounter  CHIEF COMPLAINT       Chief Complaint   Patient presents with    Cough     x1day, tx: cold & flu nyquill    Congestion    Headache       HISTORY OF PRESENT ILLNESS   Sharath Feng is a 26 y.o. female who presents with:  HPI  Reporting 3 days of sinus congestion cough and headache beginning yesterday, patient reports cough is mildly productive and intermittent. REVIEW OF SYSTEMS     Review of Systems   Constitutional: Negative for appetite change, chills, diaphoresis, fatigue and fever. HENT: Positive for congestion, sinus pressure and sore throat. Negative for ear discharge, ear pain, facial swelling, hearing loss, mouth sores, postnasal drip, rhinorrhea, sinus pain and trouble swallowing. Eyes: Negative for pain, discharge and itching. Respiratory: Positive for cough. Negative for apnea, chest tightness, shortness of breath and wheezing. Cardiovascular: Negative for chest pain. Gastrointestinal: Negative for abdominal pain, diarrhea, nausea and vomiting. Endocrine: Negative for cold intolerance and heat intolerance. Genitourinary: Negative for decreased urine volume and difficulty urinating. Musculoskeletal: Negative for arthralgias, back pain and myalgias. Skin: Negative for color change, pallor and rash. Neurological: Negative for dizziness, syncope, weakness, light-headedness and headaches. Hematological: Negative for adenopathy. Psychiatric/Behavioral: Negative for behavioral problems, confusion and sleep disturbance. PAST MEDICAL HISTORY         Diagnosis Date    Cough variant asthma 2/6/2014    Hodgkin disease ([facility] [address]     Hodgkin's     SURGICAL HISTORY     Patient  has a past surgical history that includes Tympanostomy tube placement (1997) and Breast fibroadenoma surgery (01/2013).   CURRENT MEDICATIONS       Previous Medications    ZAFEMY 150-35 MCG/24HR    apply 1 patch and replace weekly for 3 weeks ALLERGIES     Patient is is allergic to penicillins. FAMILY HISTORY     Patient'sfamily history includes Diabetes in an other family member; High Blood Pressure in her father, maternal aunt, maternal grandfather, maternal grandmother, and mother. HISTORY     Patient  reports that she has never smoked. She has never used smokeless tobacco. She reports that she does not drink alcohol and does not use drugs. PHYSICAL EXAM     VITALS  BP: 118/64, Temp: 96.4 °F (35.8 °C), Pulse: 118,  , SpO2: 98 %  Physical Exam  Constitutional:       General: She is not in acute distress. Appearance: Normal appearance. She is not ill-appearing, toxic-appearing or diaphoretic. HENT:      Head: Normocephalic. Right Ear: Tympanic membrane, ear canal and external ear normal. There is no impacted cerumen. Tympanic membrane is not erythematous, retracted or bulging. Left Ear: Ear canal and external ear normal. There is no impacted cerumen. Tympanic membrane is bulging. Tympanic membrane is not erythematous or retracted. Nose: No congestion or rhinorrhea. Mouth/Throat:      Mouth: Mucous membranes are moist.      Pharynx: Oropharynx is clear. Posterior oropharyngeal erythema present. No oropharyngeal exudate. Eyes:      General:         Right eye: No discharge. Left eye: No discharge. Cardiovascular:      Rate and Rhythm: Normal rate and regular rhythm. Pulses: Normal pulses. Heart sounds: Normal heart sounds. No murmur heard. No gallop. Pulmonary:      Effort: Pulmonary effort is normal. No respiratory distress. Breath sounds: Normal breath sounds. No stridor. No wheezing, rhonchi or rales. Chest:      Chest wall: No tenderness. Abdominal:      General: Abdomen is flat. There is no distension. Palpations: Abdomen is soft. Tenderness: There is no abdominal tenderness. Musculoskeletal:         General: Normal range of motion.       Cervical back: No rigidity or tenderness. Lymphadenopathy:      Cervical: No cervical adenopathy. Skin:     General: Skin is warm and dry. Capillary Refill: Capillary refill takes less than 2 seconds. Coloration: Skin is not pale. Neurological:      General: No focal deficit present. Mental Status: She is alert and oriented to person, place, and time. Mental status is at baseline. Psychiatric:         Mood and Affect: Mood normal.         Behavior: Behavior normal.       READY CARE COURSE   No orders of the defined types were placed in this encounter. Labs:  No results found for this visit on 10/13/21. IMAGING:  No orders to display     Scheduled Meds:  Continuous Infusions:  PRN Meds:. PROCEDURES:  FINAL IMPRESSION      1. Viral illness        DISPOSITION/PLAN     HISTORY OF PRESENT ILLNESS   Jeremiah Carlin is a 26 y.o. female who presents with 3-day onset of congestion with cough and headache beginning yesterday. Patient is not taking any medications at this time. She denies fevers denies nausea cough is intermittent mildly productive Pt is afebrile has nontoxic appearance and VS are stable. On physical exam left ear TM appears bulging no erythremia, right ear within normal limits pharynx is mildly erythemic, neck is supple no masses lung sounds are clear clear rhinorrhea noted. Rapid flu and Covid ordered both are negative. Suspicion is for viral illness. .  Patient provided education on viral type illness expectations for course of illness and recommended therapies. PATIENT REFERRED TO:  Return if symptoms worsen or fail to improve, for Follow up with PCP. DISCHARGE MEDICATIONS:  New Prescriptions    No medications on file     Cannot display discharge medications since this is not an admission.        Rell Gao, APRN - CNP

## 2021-10-13 NOTE — PATIENT INSTRUCTIONS
Patient Education        Viral Infections: Care Instructions  Your Care Instructions     You don't feel well, but it's not clear what's causing it. You may have a viral infection. Viruses cause many illnesses, such as the common cold, influenza, fever, rashes, and the diarrhea, nausea, and vomiting that are often called \"stomach flu. \" You may wonder if antibiotic medicines could make you feel better. But antibiotics only treat infections caused by bacteria. They don't work on viruses. The good news is that viral infections usually aren't serious. Most will go away in a few days without medical treatment. In the meantime, there are a few things you can do to make yourself more comfortable. Follow-up care is a key part of your treatment and safety. Be sure to make and go to all appointments, and call your doctor if you are having problems. It's also a good idea to know your test results and keep a list of the medicines you take. How can you care for yourself at home? · Get plenty of rest if you feel tired. · Take an over-the-counter pain medicine if needed, such as acetaminophen (Tylenol), ibuprofen (Advil, Motrin), or naproxen (Aleve). Read and follow all instructions on the label. · Be careful when taking over-the-counter cold or flu medicines and Tylenol at the same time. Many of these medicines have acetaminophen, which is Tylenol. Read the labels to make sure that you are not taking more than the recommended dose. Too much acetaminophen (Tylenol) can be harmful. · Drink plenty of fluids. If you have kidney, heart, or liver disease and have to limit fluids, talk with your doctor before you increase the amount of fluids you drink. · Stay home from work, school, and other public places while you have a fever. When should you call for help? Call 911 anytime you think you may need emergency care. For example, call if:    · You have severe trouble breathing.     · You passed out (lost consciousness).    Call your doctor now or seek immediate medical care if:    · You seem to be getting much sicker.     · You have a new or higher fever.     · You have blood in your stools.     · You have new belly pain, or your pain gets worse.     · You have a new rash. Watch closely for changes in your health, and be sure to contact your doctor if:    · You start to get better and then get worse.     · You do not get better as expected. Where can you learn more? Go to [email or web address]://[email or web address] org and sign in to your [employer] account. Enter F942 in the Contract Cloud box to learn more about \"Viral Infections: Care Instructions. \"     If you do not have an account, please click on the \"Sign Up Now\" link. Current as of: July 1, 2021               Content Version: 13.0  © 4104-1221 [employer], Inc[employer]. Care instructions adapted under license by [facility] ([facility]). If you have questions about a medical condition or this instruction, always ask your healthcare professional. [address] any warranty or liability for your use of this information. Use Sudafed (Pseudoephedrine) for sinus congestion as needed and Mucinex (Guaifenesin) for chest congestion. Expect a 7 to 14-day course of the illness before symptoms to resolve. Increase water intake and watch for signs of dehydration such as feeling light headed, reduced urine output and fatigue, shortness of breath when walking  or fevers that cannot be controlled with Tylenol or ibuprofen.   Go to the ER if you experience these symptoms

## 2021-12-21 ENCOUNTER — VIRTUAL VISIT (OUTPATIENT)
Dept: FAMILY MEDICINE CLINIC | Age: 26
End: 2021-12-21
Payer: COMMERCIAL

## 2021-12-21 DIAGNOSIS — C81.90 HODGKIN LYMPHOMA, UNSPECIFIED HODGKIN LYMPHOMA TYPE, UNSPECIFIED BODY REGION (HCC): ICD-10-CM

## 2021-12-21 DIAGNOSIS — Z20.822 CLOSE EXPOSURE TO COVID-19 VIRUS: Primary | ICD-10-CM

## 2021-12-21 LAB
Lab: NORMAL
PERFORMING INSTRUMENT: NORMAL
QC PASS/FAIL: NORMAL
SARS-COV-2, POC: NORMAL

## 2021-12-21 PROCEDURE — 87426 SARSCOV CORONAVIRUS AG IA: CPT | Performed by: PHYSICIAN ASSISTANT

## 2021-12-21 PROCEDURE — 99213 OFFICE O/P EST LOW 20 MIN: CPT | Performed by: PHYSICIAN ASSISTANT

## 2021-12-21 PROCEDURE — G8428 CUR MEDS NOT DOCUMENT: HCPCS | Performed by: PHYSICIAN ASSISTANT

## 2021-12-21 ASSESSMENT — ENCOUNTER SYMPTOMS
CHEST TIGHTNESS: 1
COUGH: 0
WHEEZING: 0
SORE THROAT: 1
SHORTNESS OF BREATH: 0

## 2021-12-21 NOTE — PROGRESS NOTES
2021    TELEHEALTH EVALUATION -- Audio/Visual (During IDOIC-63 public health emergency)    Due to COVID 19 outbreak, patient's office visit was converted to a virtual visit. Patient was contacted and agreed to proceed with a virtual visit via [email or web address] me  The risks and benefits of converting to a virtual visit were discussed in light of the current infectious disease epidemic. Patient also understood that insurance coverage and co-pays are up to their individual insurance plans. HPI:    [facility] (:  1995) has requested an audio/video evaluation for the following concern(s):    Positive exposure at work. Having mild symptoms--congestion, chest tightness, cough, HA, sore throat. No known fever. Denies SOB. History of lymphoma. In remission over 5 years. Review of Systems   Constitutional: Negative for appetite change and fever. HENT: Positive for congestion and sore throat. Respiratory: Positive for chest tightness. Negative for cough, shortness of breath and wheezing. Cardiovascular: Negative for chest pain. Neurological: Positive for headaches. Prior to Visit Medications    Medication Sig Taking?  Authorizing Provider   Mayela Galindo 150-35 MCG/24HR apply 1 patch and replace weekly for 3 weeks Yes Bola Diana MD       Social History     Tobacco Use    Smoking status: Never Smoker    Smokeless tobacco: Never Used   Vaping Use    Vaping Use: Never used   Substance Use Topics    Alcohol use: No    Drug use: No        Allergies   Allergen Reactions    Penicillins Hives   ,   Past Medical History:   Diagnosis Date    Cough variant asthma 2014    Hodgkin disease ([address]     Hodgkin's   ,   Past Surgical History:   Procedure Laterality Date    BREAST FIBROADENOMA SURGERY  2013    TYMPANOSTOMY TUBE PLACEMENT     ,   Social History     Tobacco Use    Smoking status: Never Smoker    Smokeless tobacco: Never Used   Vaping Use    Vaping Use: Never used Substance Use Topics    Alcohol use: No    Drug use: No   ,   Family History   Problem Relation Age of Onset    High Blood Pressure Mother     High Blood Pressure Father     High Blood Pressure Maternal Aunt     High Blood Pressure Maternal Grandmother     High Blood Pressure Maternal Grandfather     Diabetes Other         mom's side    Cancer Neg Hx     Breast Cancer Neg Hx     Prostate Cancer Neg Hx     Depression Neg Hx    ,   Immunization History   Administered Date(s) Administered    DTaP 01/28/1997, 04/07/1997, 06/23/1997, 02/23/1998, 04/23/2002    DTaP vaccine 01/28/1997, 04/07/1997, 06/23/1997, 02/23/1998, 04/23/2002    HPV 9-valent Michaud Tone) 07/12/2016    HPV Quadrivalent (Gardasil) 08/27/2013, 01/07/2014    Hepatitis A 07/12/2016    Hepatitis B 1995, 01/28/1997, 06/23/1997    Hepatitis B (Engerix-B) 08/25/2016    Hepatitis B Ped/Adol (Engerix-B, Recombivax HB) 1995, 01/28/1997, 06/23/1997    Hib PRP-OMP (PedvaxHIB) 01/28/1997, 04/07/1997, 06/23/1997    Hib, unspecified 01/28/1997, 04/07/1997, 06/23/1997    Influenza Vaccine, unspecified formulation 01/07/2014, 10/05/2017    Influenza Virus Vaccine 01/07/2014    Influenza, Melene Dean, IM, (6 mo and older Fluzone, Flulaval, Fluarix and 3 yrs and older Afluria) 10/05/2017    Influenza, Quadv, IM, PF (6 mo and older Fluzone, Flulaval, Fluarix, and 3 yrs and older Afluria) 03/17/2019, 11/17/2019    MMR 12/08/1997, 04/23/2002, 07/12/2016, 08/25/2016    Meningococcal MCV4O (Menveo) 07/12/2016    Meningococcal MCV4P (Menactra) 08/27/2013    PPD Test 04/03/2012, 07/12/2016, 07/26/2016    Pneumococcal Conjugate 13-valent ([id number]) 08/25/2016    Polio IPV (IPOL) 01/28/1997, 04/07/1997, 06/23/1997, 04/23/2002, 08/25/2016    Tdap (Boostrix, Adacel) 07/12/2016, 11/17/2019    Varicella (Varivax) 12/08/1997, 08/25/2016   ,   Health Maintenance   Topic Date Due    Hepatitis C screen  Never done    COVID-19 Vaccine (1) Never done    Pneumococcal 0-64 years Vaccine (2 of 4 - PPSV23) 10/20/2016    Flu vaccine (1) 09/01/2021    Pap smear  06/02/2023    DTaP/Tdap/Td vaccine (8 - Td or Tdap) 11/17/2029    Hepatitis B vaccine  Completed    HPV vaccine  Completed    Varicella vaccine  Completed    Meningococcal (ACWY) vaccine  Completed    HIV screen  Completed    Hepatitis A vaccine  Aged Out    Hib vaccine  Aged Out       PHYSICAL EXAMINATION:  [ INSTRUCTIONS:  \"[x]\" Indicates a positive item  \"[]\" Indicates a negative item  -- DELETE ALL ITEMS NOT EXAMINED]  [x] Alert  [x] Oriented to person/place/time    [x] No apparent distress  [] Toxic appearing    [] Face flushed appearing [] Sclera clear  [] Lips are cyanotic      [x] Breathing appears normal  [] Appears tachypneic      [] Rash on visible skin    [] Cranial Nerves II-XII grossly intact    [] Motor grossly intact in visible upper extremities    [] Motor grossly intact in visible lower extremities    [x] Normal Mood  [] Anxious appearing    [] Depressed appearing  [] Confused appearing      [] Poor short term memory  [] Poor long term memory    [] OTHER:      Due to this being a TeleHealth encounter, evaluation of the following organ systems is limited: Vitals/Constitutional/EENT/Resp/CV/GI//MS/Neuro/Skin/Heme-Lymph-Imm. ASSESSMENT/PLAN:  1. Close exposure to COVID-19 virus  - POCT COVID-19, Antigen    2. Hodgkin lymphoma, unspecified Hodgkin lymphoma type, unspecified body region [facility])    Patient to be contacted with results. Assume positive until testing returns, quarantine discussed, quarantine/social distance especially if family members positive in household. Discussed the importance of proper social distancing, hand washing, face masks. Return if symptoms worsen or fail to improve. An  electronic signature was used to authenticate this note.     --Nalini Hamilton PA-C on 12/21/2021 at 12:38 PM        Pursuant to the emergency declaration under the [facility] Act and the [address] [facility] authority and the [employer] Act, this Virtual  Visit was conducted, with patient's consent, to reduce the patient's risk of exposure to COVID-19 and provide continuity of care for an established patient. Services were provided through a video synchronous discussion virtually to substitute for in-person clinic visit.

## 2021-12-27 ENCOUNTER — VIRTUAL VISIT (OUTPATIENT)
Dept: FAMILY MEDICINE CLINIC | Age: 26
End: 2021-12-27
Payer: COMMERCIAL

## 2021-12-27 DIAGNOSIS — Z20.822 EXPOSURE TO COVID-19 VIRUS: ICD-10-CM

## 2021-12-27 DIAGNOSIS — J40 BRONCHITIS: ICD-10-CM

## 2021-12-27 DIAGNOSIS — J40 BRONCHITIS: Primary | ICD-10-CM

## 2021-12-27 PROCEDURE — 99000 SPECIMEN HANDLING OFFICE-LAB: CPT | Performed by: NURSE PRACTITIONER

## 2021-12-27 PROCEDURE — 99213 OFFICE O/P EST LOW 20 MIN: CPT | Performed by: NURSE PRACTITIONER

## 2021-12-27 RX ORDER — AZITHROMYCIN 250 MG/1
250 TABLET, FILM COATED ORAL SEE ADMIN INSTRUCTIONS
Qty: 6 TABLET | Refills: 0 | Status: SHIPPED | OUTPATIENT
Start: 2021-12-27 | End: 2022-01-01

## 2021-12-27 ASSESSMENT — ENCOUNTER SYMPTOMS
SORE THROAT: 1
NAUSEA: 0
STRIDOR: 0
RHINORRHEA: 1
SINUS PAIN: 1
DIARRHEA: 0
VOMITING: 0
ABDOMINAL PAIN: 0
SINUS PRESSURE: 1
WHEEZING: 0
COUGH: 1
TROUBLE SWALLOWING: 1
CHEST TIGHTNESS: 0
SHORTNESS OF BREATH: 0

## 2021-12-27 NOTE — PATIENT INSTRUCTIONS
Patient Education        Learning About Chronic Bronchitis  What is chronic bronchitis? Chronic bronchitis is long-term swelling and the buildup of mucus in the airways of your lungs. The airways (bronchial tubes) get inflamed and make a lot of mucus. This can narrow or block the airways, making it hard for you to breathe. It can also make you cough. It is a type of COPD (chronic obstructive pulmonary disease). Chronic bronchitis is usually caused by smoking. But chemical fumes, dust, or air pollution also can cause it over time. What can you expect when you have chronic bronchitis? Chronic bronchitis gets worse over time. You cannot undo the damage to your lungs. Over time, you may find that:  · You get short of breath even when you do things like get dressed or fix a meal.  · It is hard to eat or exercise. · You feel weaker and limit activity. Over many years, the swelling and mucus from chronic bronchitis make it more likely that you will get lung infections. But there are things you can do to prevent more damage and feel better. What are the symptoms? The main symptoms of chronic bronchitis are:  · A cough that will not go away. · Mucus that comes up when you cough. · Shortness of breath that gets worse when you exercise. At times, your symptoms may suddenly flare up and get much worse. This is a called an exacerbation (say \"egg-ZAMARU--BAY-jaison\"). When this happens, your usual symptoms quickly get worse and stay bad. This can be dangerous. You may have to go to the hospital.  How can you keep chronic bronchitis from getting worse? Don't smoke. That is the best way to keep chronic bronchitis from getting worse. If you already smoke, it is never too late to stop. If you need help quitting, talk to your doctor about stop-smoking programs and medicines. These can increase your chances of quitting for good. You can do other things to keep chronic bronchitis from getting worse:  · Avoid bad air. Air pollution, chemical fumes, and dust also can make chronic bronchitis worse. · Get a flu shot every year. A shot may keep the flu from turning into something more serious, like pneumonia. A flu shot also may lower your chances of having a flare-up. · Get a pneumococcal shot. A shot can prevent some of the serious complications of pneumonia. Ask your doctor how often you should get this shot. · Get a pertussis shot. A whooping cough (pertussis) shot may help protect you from getting whooping cough. How is chronic bronchitis treated? Chronic bronchitis is treated with medicines and oxygen. You also can take steps to stay healthy and keep your condition from getting worse. Medicines and oxygen therapy  · You may be taking medicines such as:  ? Bronchodilators. These help open your airways and make breathing easier. Bronchodilators are either short-acting (work for 4 to 9 hours) or long-acting (work for 12 to 24 hours). You inhale most bronchodilators, so they start to act quickly. Always carry your quick-relief inhaler with you in case you need it. ? Corticosteroids. These reduce airway inflammation. They come in inhaled or pill form. ? Antibiotics. These medicines are used when you have a bacterial lung infection. · Take your medicines exactly as prescribed. Call your doctor if you think you are having a problem with your medicine. · Oxygen therapy boosts the amount of oxygen in your blood and helps you breathe easier. Use the flow rate your doctor has recommended, and do not change it without talking to your doctor first.  Other care  · If your doctor recommends it, get more exercise. Walking is a good choice. Bit by bit, increase the amount you walk every day. Try for at least 30 minutes on most days of the week. · Learn breathing methodssuch as breathing through pursed lipsto help you become less short of breath.   · If your doctor has not set you up with a pulmonary rehabilitation program, ask your your quarantine. Be sure to follow all instructions from your local health authorities. Don't go to school, work, or public areas. And don't use public transportation, ride-shares, or taxis unless you have no choice. Leave your home only if you need to get medical care. But call the doctor's office first so they know you're coming. Call your doctor. Call your doctor or other health professional to let them know that you've been exposed. They might want you to be tested, or they may have other instructions for you. Wear a mask when you are around other people. It can help stop the spread of the virus. Limit contact with people in your home. If possible, stay in a separate bedroom and use a separate bathroom. Avoid contact with pets and other animals. Cover your mouth and nose with a tissue when you cough or sneeze. Then throw it in the trash right away. Wash your hands often, especially after you cough or sneeze. Use soap and water, and scrub for at least 20 seconds. If soap and water aren't available, use an alcohol-based hand . Don't share personal household items. These include bedding, towels, cups and glasses, and eating utensils. Clean and disinfect your home every day. Use household  or disinfectant wipes or sprays. Current as of: March 26, 2021               Content Version: 13.1  © 2006-2021 [employer]. Care instructions adapted under license by [facility] ([facility]). If you have questions about a medical condition or this instruction, always ask your healthcare professional. [place] [address] any warranty or liability for your use of this information.

## 2021-12-27 NOTE — PROGRESS NOTES
This visit began at [address]  Patient has been screened to determine that this visit qualifies for a \"Telephone Visit\". Patient is currently established with the current medical practice, the condition being reviewed was not addressed within the previous 7 days and is not likely be determined to need a procedure within the next 24 hours. This visit was via telephone due to the restrictions of the COVID-19 pandemic. All issues as below were discussed and addressed but no physical exam was performed. It was felt the patient should be evaluated in the clinic there will be comment below demonstrating they were directed there. The patient is aware and has given verbal consent to be billed for this telephone encounter. Chief Complaint   Patient presents with    Cough     cough, body aches, fatigue, loss of taste and smell exposure        HPI  Patient is on telephone visit for c/o loss of taste and smell. Says she also has some sinus congestion. Says she has had chest and nasal congestion. Says she has had no fever. Says she has chills and body aches. Says she has had green mucus the last few days as well. Says she has no GI sx. Appetite is good. Says she was tested last Tuesday as she has exposure and she was neg at the time. Says she has had pneumonia in the past.    Says she has no underlying health issues. PMH:    Current Outpatient Medications on File Prior to Visit   Medication Sig Dispense Refill    ZAFEMY 150-35 MCG/24HR apply 1 patch and replace weekly for 3 weeks (Patient not taking: Reported on 12/27/2021) 12 patch 3     No current facility-administered medications on file prior to visit.      Past Medical History:   Diagnosis Date    Cough variant asthma 2/6/2014    Hodgkin disease ([address]     Hodgkin's     Past Surgical History:   Procedure Laterality Date    BREAST FIBROADENOMA SURGERY  01/2013    [facility]MPANOSTOMY [facility]     Social History Socioeconomic History    Marital status:      Spouse name: Not on file    Number of children: Not on file    Years of education: Not on file    Highest education level: Not on file   Occupational History    Not on file   Tobacco Use    Smoking status: Never Smoker    Smokeless tobacco: Never Used   Vaping Use    Vaping Use: Never used   Substance and Sexual Activity    Alcohol use: No    Drug use: No    Sexual activity: Yes     Partners: Male   Other Topics Concern    Not on file   Social History Narrative    Not on file     Social Determinants of Health     Financial Resource Strain: Low Risk     Difficulty of Paying Living Expenses: Not hard at all   Food Insecurity: No Food Insecurity    Worried About [address] in the Last Year: Never true    [address] [employer] in the Last Year: Never true   Transportation Needs:     Lack of Transportation (Medical): Not on file    Lack of Transportation (Non-Medical):  Not on file   Physical Activity:     Days of Exercise per Week: Not on file    Minutes of Exercise per Session: Not on file   Stress:     Feeling of Stress : Not on file   Social Connections:     Frequency of Communication with Friends and Family: Not on file    Frequency of Social Gatherings with Friends and Family: Not on file    Attends [employer] Services: Not on file    Active Member of [address] [place] [address] [employer] or Organizations: Not on file    Attends Club or Organization Meetings: Not on file    Marital Status: Not on file   Intimate Partner Violence:     Fear of Current or Ex-Partner: Not on file    Emotionally Abused: Not on file    Physically Abused: Not on file    Sexually Abused: Not on file   Housing Stability:     Unable to Pay for Housing in the Last Year: Not on file    Number of [place] in the Last Year: Not on file    Unstable Housing in the Last Year: Not on file     Family History   Problem Relation Age of Onset    High Blood Pressure Mother     High Blood Pressure Father     High Blood Pressure Maternal Aunt     High Blood Pressure Maternal Grandmother     High Blood Pressure Maternal Grandfather     Diabetes Other         mom's side    Cancer Neg Hx     Breast Cancer Neg Hx     Prostate Cancer Neg Hx     Depression Neg Hx      Allergies:  Penicillins    Review of Systems   Constitutional: Positive for chills and fatigue. Negative for activity change, appetite change, diaphoresis, fever and unexpected weight change. HENT: Positive for congestion, postnasal drip, rhinorrhea, sinus pressure, sinus pain, sneezing, sore throat and trouble swallowing. Negative for ear pain. Respiratory: Positive for cough. Negative for chest tightness, shortness of breath, wheezing and stridor. Cardiovascular: Negative for chest pain. Gastrointestinal: Negative for abdominal pain, diarrhea, nausea and vomiting. Musculoskeletal: Positive for arthralgias and myalgias. Skin: Negative for rash. Neurological: Positive for weakness and headaches. Negative for dizziness and light-headedness. PHYSICAL EXAM / RESULTS    There were no vitals taken for this visit. Physical Exam  Constitutional:       General: She is awake. HENT:      Right Ear: Hearing normal.      Left Ear: Hearing normal.   Pulmonary:      Effort: Pulmonary effort is normal.   Neurological:      Mental Status: She is alert and oriented to person, place, and time. Psychiatric:         Behavior: Behavior is cooperative.        Vitals signs: pt does not have the proper equipment to take all VS.    The physical is not performed due to the visit being a telephone counter as indicated due to the restrictions of the COVID-19 pandemic    Recent Results (from the past 2016 hour(s))   POCT COVID-19, Antigen    Collection Time: 10/13/21 11:26 AM   Result Value Ref Range    SARS-COV-2, POC Not-Detected Not Detected    Lot Number [id number]     QC Pass/Fail Pass     Performing Instrument BD Veritor    POCT Influenza A/B    Collection Time: 10/13/21 11:28 AM   Result Value Ref Range    Influenza A Ab Negative     Influenza B Ab Negative    POCT COVID-19, Antigen    Collection Time: 12/21/21  6:57 PM   Result Value Ref Range    SARS-COV-2, POC Not-Detected Not Detected    Lot Number [id number]     QC Pass/Fail Pass     Performing Instrument BD Veritor            Assessment:       Diagnosis Orders   1. Bronchitis  azithromycin (ZITHROMAX) 250 MG tablet    Covid-19 Ambulatory   2. Exposure to COVID-19 virus  Covid-19 Ambulatory         Orders Placed This Encounter   Procedures    Covid-19 Ambulatory     Standing Status:   Future     Standing Expiration Date:   12/27/2022     Scheduling Instructions:      Saline media preferred given current shortage of viral transport media but both acceptable     Order Specific Question:   Is this test for diagnosis or screening? Answer:   Diagnosis of ill patient     Order Specific Question:   Symptomatic for COVID-19 as defined by CDC? Answer:   Yes     Order Specific Question:   Date of Symptom Onset     Answer:   12/21/2021     Order Specific Question:   Hospitalized for COVID-19? Answer:   No     Order Specific Question:   Admitted to ICU for COVID-19? Answer:   No     Order Specific Question:   Employed in healthcare setting? Answer:   No     Order Specific Question:   Resident in a congregate (group) care setting? Answer:   No     Order Specific Question:   Pregnant? Answer:   No     Order Specific Question:   Previously tested for COVID-19? Answer:   Yes         Plan:   No follow-ups on file. There are no Patient Instructions on file for this visit. Discussed with patient as she has been ill for 1 week will cover for bronchitis with oral ANTB. She has taken in the past and is aware of SE and administration. Antibiotic Instructions:   Complete the full course of antibiotics as ordered.   To prevent antibiotic resistances please take medication as ordered and for the full duration even if you start to feel better. Take each dose with a small snack or meal to lessen potential GI upset. Consider intake of yogurt or probiotic during antibiotic use and for a few days after to help reduce the risk of developing a secondary infection. Take the yogurt or probiotic at least 2 hours after taking the antibiotic. Avoid alcohol while taking antibiotics. If you are using contraception please use a back up method of contraception such as condoms during antibiotic use and for 7 days after completing treatment to prevent pregnancy. We'll call with COVID-19 results  Results will also be available on your [facility] account, if activated  Things to Know:   - Do not leave home except to get medical care while waiting for your results  - Testing does not change treatment--> there is no medication that treats COVID-19  - Drink plenty of fluids and rest as much as needed  - May take over the counter medicine such as ibuprofen (aka Motrin/ Advil) or acetaminophen (aka Tylenol) for pain or fever, follow directions on label  - Continually monitor symptoms + contact a medical provider if symptoms are worsening, such as difficulty breathing  - Avoid exposure to environmental irritants/ allergens where possible    - Practice social distancing and wear a face mask when leaving home is necessary  - Symptoms may develop 2 days to 2 weeks following exposure to the virus- if you are exposed after testing, or if you were in the incubation period when tested, you could become ill with COVID-19 later    Keep a low threshold to go to ER or call 9-1-1 for new or suddenly worsening symptoms --> trouble breathing, high fevers that are unresponsive to fever-reducing medications, difficulty staying awake, vomiting/ unable to keep food or fluids down, any other symptoms that you think could be an emergency.       This visit ended at [employer]    Electronically signed by Alfreda Rodriguez Rachell Gordon - DORIS

## 2021-12-29 ENCOUNTER — TELEPHONE (OUTPATIENT)
Dept: FAMILY MEDICINE CLINIC | Age: 26
End: 2021-12-29

## 2021-12-29 LAB
SARS-COV-2: DETECTED
SOURCE: ABNORMAL

## 2021-12-29 NOTE — TELEPHONE ENCOUNTER
Advised patient PCR covid testing + and will need to remain in quarantine for 10 full days and then cont with mitigation techniques. Advised patient to cont to monitor sx and report to ER with any breathing issues. Patient states she is feeling better.

## 2022-01-18 NOTE — ED TRIAGE NOTES
Pt presents to the Er with complaints of left sided flank pain, painful urination, and frequency,.   Patient has history of UTI with kidney infection  Afebrile  Patient states she has been having pain for 2 weeks but it is getting worse  Denies NVD [phone]

## 2022-01-27 RX ORDER — NORELGESTROMIN AND ETHINLY ESTRADIOL 150; 35 UG/D; UG/D
PATCH TRANSDERMAL
Qty: 12 PATCH | Refills: 3 | OUTPATIENT
Start: 2022-01-27

## 2022-02-18 ENCOUNTER — TELEPHONE (OUTPATIENT)
Dept: FAMILY MEDICINE CLINIC | Age: 27
End: 2022-02-18

## 2022-03-19 ENCOUNTER — HOSPITAL ENCOUNTER (EMERGENCY)
Age: 27
Discharge: HOME | End: 2022-03-19
Attending: EMERGENCY MEDICINE
Payer: COMMERCIAL

## 2022-03-19 ENCOUNTER — APPOINTMENT (OUTPATIENT)
Dept: CT IMAGING | Age: 27
End: 2022-03-19
Payer: COMMERCIAL

## 2022-03-19 VITALS
SYSTOLIC BLOOD PRESSURE: 100 MMHG | RESPIRATION RATE: 16 BRPM | OXYGEN SATURATION: 98 % | WEIGHT: 107 LBS | HEIGHT: 63 IN | HEART RATE: 70 BPM | DIASTOLIC BLOOD PRESSURE: 76 MMHG | BODY MASS INDEX: 18.96 KG/M2 | TEMPERATURE: 97.8 F

## 2022-03-19 DIAGNOSIS — N20.1 URETEROLITHIASIS: Primary | ICD-10-CM

## 2022-03-19 LAB
ALBUMIN SERPL-MCNC: 4.6 G/DL (ref 3.5–4.6)
ALP BLD-CCNC: 67 U/L (ref 40–130)
ALT SERPL-CCNC: 14 U/L (ref 0–33)
ANION GAP SERPL CALCULATED.3IONS-SCNC: 15 MEQ/L (ref 9–15)
AST SERPL-CCNC: 22 U/L (ref 0–35)
BACTERIA: NEGATIVE /HPF
BASOPHILS ABSOLUTE: 0 K/UL (ref 0–0.2)
BASOPHILS RELATIVE PERCENT: 0.2 %
BILIRUB SERPL-MCNC: 0.6 MG/DL (ref 0.2–0.7)
BILIRUBIN URINE: NEGATIVE
BLOOD, URINE: ABNORMAL
BUN BLDV-MCNC: 24 MG/DL (ref 6–20)
CALCIUM SERPL-MCNC: 9.4 MG/DL (ref 8.5–9.9)
CHLORIDE BLD-SCNC: 103 MEQ/L (ref 95–107)
CLARITY: CLEAR
CO2: 22 MEQ/L (ref 20–31)
COLOR: YELLOW
CREAT SERPL-MCNC: 0.76 MG/DL (ref 0.5–0.9)
EOSINOPHILS ABSOLUTE: 0 K/UL (ref 0–0.7)
EOSINOPHILS RELATIVE PERCENT: 1 %
EPITHELIAL CELLS, UA: ABNORMAL /HPF (ref 0–5)
GFR AFRICAN AMERICAN: >60
GFR NON-AFRICAN AMERICAN: >60
GLOBULIN: 2.4 G/DL (ref 2.3–3.5)
GLUCOSE BLD-MCNC: 91 MG/DL (ref 70–99)
GLUCOSE URINE: NEGATIVE MG/DL
HCG, URINE, POC: NEGATIVE
HCT VFR BLD CALC: 40.1 % (ref 37–47)
HEMOGLOBIN: 13.9 G/DL (ref 12–16)
HYALINE CASTS: ABNORMAL /HPF (ref 0–5)
KETONES, URINE: NEGATIVE MG/DL
LEUKOCYTE ESTERASE, URINE: NEGATIVE
LYMPHOCYTES ABSOLUTE: 1.6 K/UL (ref 1–4.8)
LYMPHOCYTES RELATIVE PERCENT: 35.7 %
Lab: NORMAL
MCH RBC QN AUTO: 32.1 PG (ref 27–31.3)
MCHC RBC AUTO-ENTMCNC: 34.7 % (ref 33–37)
MCV RBC AUTO: 92.6 FL (ref 82–100)
MONOCYTES ABSOLUTE: 0.3 K/UL (ref 0.2–0.8)
MONOCYTES RELATIVE PERCENT: 6.6 %
NEGATIVE QC PASS/FAIL: NORMAL
NEUTROPHILS ABSOLUTE: 2.6 K/UL (ref 1.4–6.5)
NEUTROPHILS RELATIVE PERCENT: 56.5 %
NITRITE, URINE: NEGATIVE
PDW BLD-RTO: 13.4 % (ref 11.5–14.5)
PH UA: 5.5 (ref 5–9)
PLATELET # BLD: 227 K/UL (ref 130–400)
POSITIVE QC PASS/FAIL: NORMAL
POTASSIUM SERPL-SCNC: 4.2 MEQ/L (ref 3.4–4.9)
PROTEIN UA: 30 MG/DL
RBC # BLD: 4.32 M/UL (ref 4.2–5.4)
RBC UA: ABNORMAL /HPF (ref 0–5)
SODIUM BLD-SCNC: 140 MEQ/L (ref 135–144)
SPECIFIC GRAVITY UA: 1.02 (ref 1–1.03)
TOTAL PROTEIN: 7 G/DL (ref 6.3–8)
URINE REFLEX TO CULTURE: ABNORMAL
UROBILINOGEN, URINE: 0.2 E.U./DL
WBC # BLD: 4.6 K/UL (ref 4.8–10.8)
WBC UA: ABNORMAL /HPF (ref 0–5)

## 2022-03-19 PROCEDURE — 80053 COMPREHEN METABOLIC PANEL: CPT

## 2022-03-19 PROCEDURE — 36415 COLL VENOUS BLD VENIPUNCTURE: CPT

## 2022-03-19 PROCEDURE — 96374 THER/PROPH/DIAG INJ IV PUSH: CPT

## 2022-03-19 PROCEDURE — 85025 COMPLETE CBC W/AUTO DIFF WBC: CPT

## 2022-03-19 PROCEDURE — 99284 EMERGENCY DEPT VISIT MOD MDM: CPT

## 2022-03-19 PROCEDURE — 96375 TX/PRO/DX INJ NEW DRUG ADDON: CPT

## 2022-03-19 PROCEDURE — 74176 CT ABD & PELVIS W/O CONTRAST: CPT

## 2022-03-19 PROCEDURE — 81001 URINALYSIS AUTO W/SCOPE: CPT

## 2022-03-19 PROCEDURE — 6360000002 HC RX W HCPCS: Performed by: EMERGENCY MEDICINE

## 2022-03-19 RX ORDER — ONDANSETRON 2 MG/ML
4 INJECTION INTRAMUSCULAR; INTRAVENOUS ONCE
Status: COMPLETED | OUTPATIENT
Start: 2022-03-19 | End: 2022-03-19

## 2022-03-19 RX ORDER — OXYCODONE HYDROCHLORIDE AND ACETAMINOPHEN 5; 325 MG/1; MG/1
1-2 TABLET ORAL EVERY 4 HOURS PRN
Qty: 15 TABLET | Refills: 0 | Status: SHIPPED | OUTPATIENT
Start: 2022-03-19 | End: 2022-03-22

## 2022-03-19 RX ORDER — KETOROLAC TROMETHAMINE 30 MG/ML
30 INJECTION, SOLUTION INTRAMUSCULAR; INTRAVENOUS ONCE
Status: COMPLETED | OUTPATIENT
Start: 2022-03-19 | End: 2022-03-19

## 2022-03-19 RX ORDER — ONDANSETRON 4 MG/1
4 TABLET, ORALLY DISINTEGRATING ORAL EVERY 8 HOURS PRN
Qty: 20 TABLET | Refills: 0 | Status: SHIPPED | OUTPATIENT
Start: 2022-03-19 | End: 2022-04-14

## 2022-03-19 RX ORDER — TAMSULOSIN HYDROCHLORIDE 0.4 MG/1
0.4 CAPSULE ORAL DAILY
Qty: 30 CAPSULE | Refills: 0 | Status: SHIPPED | OUTPATIENT
Start: 2022-03-19 | End: 2022-04-12

## 2022-03-19 RX ADMIN — KETOROLAC TROMETHAMINE 30 MG: 30 INJECTION, SOLUTION INTRAMUSCULAR at 06:11

## 2022-03-19 RX ADMIN — ONDANSETRON 4 MG: 2 INJECTION INTRAMUSCULAR; INTRAVENOUS at 06:11

## 2022-03-19 RX ADMIN — HYDROMORPHONE HYDROCHLORIDE 1 MG: 1 INJECTION, SOLUTION INTRAMUSCULAR; INTRAVENOUS; SUBCUTANEOUS at 06:11

## 2022-03-19 ASSESSMENT — PAIN DESCRIPTION - PAIN TYPE: TYPE: ACUTE PAIN

## 2022-03-19 ASSESSMENT — ENCOUNTER SYMPTOMS
PHOTOPHOBIA: 0
VOMITING: 1
CHEST TIGHTNESS: 0
COUGH: 0
NAUSEA: 1
ABDOMINAL PAIN: 0
EYE DISCHARGE: 0
SHORTNESS OF BREATH: 0
WHEEZING: 0
SORE THROAT: 0
ABDOMINAL DISTENTION: 0

## 2022-03-19 ASSESSMENT — PAIN SCALES - GENERAL
PAINLEVEL_OUTOF10: 3
PAINLEVEL_OUTOF10: 3
PAINLEVEL_OUTOF10: 9
PAINLEVEL_OUTOF10: 9

## 2022-03-19 ASSESSMENT — PAIN DESCRIPTION - LOCATION: LOCATION: ABDOMEN

## 2022-03-19 ASSESSMENT — PAIN DESCRIPTION - FREQUENCY: FREQUENCY: CONTINUOUS

## 2022-03-19 ASSESSMENT — PAIN DESCRIPTION - ORIENTATION: ORIENTATION: LEFT

## 2022-03-19 ASSESSMENT — PAIN DESCRIPTION - ONSET: ONSET: ON-GOING

## 2022-03-19 NOTE — ED TRIAGE NOTES
Patient present to the ED from with left side kidney pain. Patient report Hx of kidney stones. Patient repot that her last stone was about 6 months ago. Patient report that there is a stone that has not passed.   Patient report N/V since 3 am.  Patient is A/O X4

## 2022-03-19 NOTE — ED NOTES
Discharge instructions explained to patient with verbalization of understanding. Patient discharged in care of her mother.       Hugo Arriaga RN  03/19/22 [id number]

## 2022-03-19 NOTE — ED PROVIDER NOTES
[address] [facility] ED  eMERGENCY dEPARTMENT eNCOUnter      Pt Name: Betzy Curtis  MRN: [id number]  [place] 1995  Date of evaluation: 3/19/2022  Provider: Kwasi Manzano MD    CHIEF COMPLAINT       Chief Complaint   Patient presents with    Flank Pain         HISTORY OF PRESENT ILLNESS   (Location/Symptom, Timing/Onset,Context/Setting, Quality, Duration, Modifying Factors, Severity)  Note limiting factors. Betzy Curtis is a 23 y.o. female who presents to the emergency department for evaluation of left flank pain. Patient states she has had intermittent discomfort over the past 2 weeks that has become much more severe over the past 24 hours. She has flank pain rating around the left front with associated nausea and vomiting. Prior history of kidney stones this feels similar. She denies chest pain or shortness of breath. No visible blood in the urine and no increased frequency. HPI    NursingNotes were reviewed. REVIEW OF SYSTEMS    (2-9 systems for level 4, 10 or more for level 5)     Review of Systems   Constitutional: Negative for chills and diaphoresis. HENT: Negative for congestion, ear pain, mouth sores and sore throat. Eyes: Negative for photophobia and discharge. Respiratory: Negative for cough, chest tightness, shortness of breath and wheezing. Cardiovascular: Negative for chest pain and palpitations. Gastrointestinal: Positive for nausea and vomiting. Negative for abdominal distention and abdominal pain. Endocrine: Negative for cold intolerance. Genitourinary: Positive for flank pain. Negative for difficulty urinating and pelvic pain. Musculoskeletal: Negative for arthralgias. Skin: Negative for pallor and rash. Allergic/Immunologic: Negative for immunocompromised state. Neurological: Negative for dizziness and syncope. Hematological: Negative for adenopathy. Psychiatric/Behavioral: Negative for agitation and hallucinations.    All other systems reviewed and are negative. Except as noted above the remainder of the review of systems was reviewed and negative. PAST MEDICAL HISTORY     Past Medical History:   Diagnosis Date    Cough variant asthma 2/6/2014    Hodgkin disease ([facility] [address]     Hodgkin's         SURGICALHISTORY       Past Surgical History:   Procedure Laterality Date    BREAST FIBROADENOMA SURGERY  01/2013    TYMPANOSTOMY TUBE PLACEMENT  1997         CURRENT MEDICATIONS       Previous Medications    ZAFEMY 150-35 MCG/24HR    apply 1 patch and replace weekly for 3 weeks       ALLERGIES     Penicillins    FAMILY HISTORY       Family History   Problem Relation Age of Onset    High Blood Pressure Mother     High Blood Pressure Father     High Blood Pressure Maternal Aunt     High Blood Pressure Maternal Grandmother     High Blood Pressure Maternal Grandfather     Diabetes Other         mom's side    Cancer Neg Hx     Breast Cancer Neg Hx     Prostate Cancer Neg Hx     Depression Neg Hx           SOCIAL HISTORY       Social History     Socioeconomic History    Marital status:      Spouse name: Not on file    Number of children: Not on file    Years of education: Not on file    Highest education level: Not on file   Occupational History    Not on file   Tobacco Use    Smoking status: Never Smoker    Smokeless tobacco: Never Used   Vaping Use    Vaping Use: Never used   Substance and Sexual Activity    Alcohol use: No    Drug use: No    Sexual activity: Yes     Partners: Male   Other Topics Concern    Not on file   Social History Narrative    Not on file     Social Determinants of Health     Financial Resource Strain:     Difficulty of Paying Living Expenses: Not on file   Food Insecurity: No Food Insecurity    Worried About Running Out of Food in the Last Year: Never true    Bridget of Food in the Last Year: Never true   Transportation Needs:     Lack of Transportation (Medical):  Not on file    Lack of Transportation (Non-Medical): Not on file   Physical Activity:     Days of Exercise per Week: Not on file    Minutes of Exercise per Session: Not on file   Stress:     Feeling of Stress : Not on file   Social Connections:     Frequency of Communication with Friends and Family: Not on file    Frequency of Social Gatherings with Friends and Family: Not on file    Attends Jew Services: Not on file    Active Member of [address] [place] [address] or Organizations: Not on file    Attends Club or Organization Meetings: Not on file    Marital Status: Not on file   Intimate Partner Violence:     Fear of Current or Ex-Partner: Not on file    Emotionally Abused: Not on file    Physically Abused: Not on file    Sexually Abused: Not on file   Housing Stability:     Unable to Pay for Housing in the Last Year: Not on file    Number of [place] in the Last Year: Not on file    Unstable Housing in the Last Year: Not on file       SCREENINGS      @FLOW([id number])@      PHYSICAL EXAM    (up to 7 for level 4, 8 or more for level 5)     ED Triage Vitals [03/19/22 0551]   BP Temp Temp Source Pulse Resp SpO2 Height Weight   (!) 129/101 97.8 °F (36.6 °C) Oral 70 16 100 % 5' 3\" (1.6 m) 107 lb (48.5 kg)       Physical Exam  Vitals and nursing note reviewed. Constitutional:       Appearance: She is well-developed. She is ill-appearing. HENT:      Head: Normocephalic. Nose: Nose normal.   Eyes:      Conjunctiva/sclera: Conjunctivae normal.      Pupils: Pupils are equal, round, and reactive to light. Cardiovascular:      Rate and Rhythm: Normal rate and regular rhythm. Heart sounds: Normal heart sounds. Pulmonary:      Effort: Pulmonary effort is normal.      Breath sounds: Normal breath sounds. Abdominal:      General: Bowel sounds are normal.      Palpations: Abdomen is soft. Tenderness: There is no abdominal tenderness. There is no guarding. Musculoskeletal:         General: Normal range of motion.       Cervical back: Normal range of motion and neck supple. Skin:     General: Skin is warm and dry. Capillary Refill: Capillary refill takes less than 2 seconds. Neurological:      Mental Status: She is alert and oriented to person, place, and time. Psychiatric:         Mood and Affect: Mood normal.         DIAGNOSTIC RESULTS     EKG: All EKG's are interpreted by the Emergency Department Physician who either signs or Co-signsthis chart in the absence of a cardiologist.        RADIOLOGY:   Jose Fudge such as CT, Ultrasound and MRI are read by the radiologist. Plain radiographic images are visualized and preliminarily interpreted by the emergency physician with the below findings:        Interpretation per the Radiologist below, if available at the time ofthis note:    CT ABDOMEN PELVIS WO CONTRAST Additional Contrast? None    (Results Pending)         ED BEDSIDE ULTRASOUND:   Performed by ED Physician - none    LABS:  Labs Reviewed   CBC WITH AUTO DIFFERENTIAL - Abnormal; Notable for the following components:       Result Value    WBC 4.6 (*)     MCH 32.1 (*)     All other components within normal limits   URINALYSIS WITH REFLEX TO CULTURE - Abnormal; Notable for the following components:    Blood, Urine LARGE (*)     Protein, UA 30 (*)     All other components within normal limits   MICROSCOPIC URINALYSIS - Abnormal; Notable for the following components:    RBC, UA  (*)     All other components within normal limits   COMPREHENSIVE METABOLIC PANEL   POC PREGNANCY UR-QUAL       All other labs were within normal range or not returned as of this dictation.     EMERGENCY DEPARTMENT COURSE and DIFFERENTIAL DIAGNOSIS/MDM:   Vitals:    Vitals:    03/19/22 0551   BP: (!) 129/101   Pulse: 70   Resp: 16   Temp: 97.8 °F (36.6 °C)   TempSrc: Oral   SpO2: 100%   Weight: 107 lb (48.5 kg)   Height: 5' 3\" (1.6 m)          MDM care turned over to Dr. Ramiro Sousa at 0700        CONSULTS:  None    PROCEDURES:  Unless otherwise noted below, none     Procedures    FINAL IMPRESSION      1. Ureterolithiasis          DISPOSITION/PLAN   DISPOSITION        PATIENT REFERRED TO:  Caroline Joshua MD  [address] [id number] [address]  [phone]    Schedule an appointment as soon as possible for a visit   If symptoms worsen      DISCHARGE MEDICATIONS:  New Prescriptions    ONDANSETRON (ZOFRAN ODT) 4 MG DISINTEGRATING TABLET    Take 1 tablet by mouth every 8 hours as needed for Nausea    OXYCODONE-ACETAMINOPHEN (PERCOCET) 5-325 MG PER TABLET    Take 1-2 tablets by mouth every 4 hours as needed for Pain for up to 3 days.     TAMSULOSIN (FLOMAX) 0.4 MG CAPSULE    Take 1 capsule by mouth daily          (Please note that portions of this note were completed with a voice recognition program.  Efforts were made to edit the dictations but occasionally words are mis-transcribed.)    Ez Mendoza MD (electronically signed)  Attending Emergency Physician          Alice Curry DO  03/19/22 7613

## 2022-04-12 ENCOUNTER — HOSPITAL ENCOUNTER (EMERGENCY)
Age: 27
Discharge: HOME | End: 2022-04-12
Payer: COMMERCIAL

## 2022-04-12 ENCOUNTER — APPOINTMENT (OUTPATIENT)
Dept: CT IMAGING | Age: 27
End: 2022-04-12
Payer: COMMERCIAL

## 2022-04-12 VITALS
HEART RATE: 59 BPM | HEIGHT: 63 IN | RESPIRATION RATE: 16 BRPM | OXYGEN SATURATION: 99 % | SYSTOLIC BLOOD PRESSURE: 106 MMHG | BODY MASS INDEX: 19.49 KG/M2 | WEIGHT: 110 LBS | DIASTOLIC BLOOD PRESSURE: 82 MMHG | TEMPERATURE: 98.1 F

## 2022-04-12 DIAGNOSIS — N20.0 KIDNEY STONE: Primary | ICD-10-CM

## 2022-04-12 DIAGNOSIS — N20.0 NEPHROLITHIASIS: Primary | ICD-10-CM

## 2022-04-12 LAB
ALBUMIN SERPL-MCNC: 4.5 G/DL (ref 3.5–4.6)
ALP BLD-CCNC: 84 U/L (ref 40–130)
ALT SERPL-CCNC: 15 U/L (ref 0–33)
ANION GAP SERPL CALCULATED.3IONS-SCNC: 12 MEQ/L (ref 9–15)
AST SERPL-CCNC: 25 U/L (ref 0–35)
BACTERIA: NEGATIVE /HPF
BASOPHILS ABSOLUTE: 0 K/UL (ref 0–0.2)
BASOPHILS RELATIVE PERCENT: 0.4 %
BILIRUB SERPL-MCNC: 0.6 MG/DL (ref 0.2–0.7)
BILIRUBIN URINE: NEGATIVE
BLOOD, URINE: ABNORMAL
BUN BLDV-MCNC: 26 MG/DL (ref 6–20)
CALCIUM SERPL-MCNC: 9.3 MG/DL (ref 8.5–9.9)
CHLORIDE BLD-SCNC: 105 MEQ/L (ref 95–107)
CLARITY: CLEAR
CO2: 23 MEQ/L (ref 20–31)
COLOR: YELLOW
CREAT SERPL-MCNC: 1.3 MG/DL (ref 0.5–0.9)
EOSINOPHILS ABSOLUTE: 0.1 K/UL (ref 0–0.7)
EOSINOPHILS RELATIVE PERCENT: 0.8 %
EPITHELIAL CELLS, UA: ABNORMAL /HPF (ref 0–5)
GFR AFRICAN AMERICAN: >60
GFR NON-AFRICAN AMERICAN: 49.7
GLOBULIN: 2.6 G/DL (ref 2.3–3.5)
GLUCOSE BLD-MCNC: 101 MG/DL (ref 70–99)
GLUCOSE URINE: NEGATIVE MG/DL
HCT VFR BLD CALC: 41.3 % (ref 37–47)
HEMOGLOBIN: 14 G/DL (ref 12–16)
HYALINE CASTS: ABNORMAL /HPF (ref 0–5)
KETONES, URINE: ABNORMAL MG/DL
LACTIC ACID: 0.7 MMOL/L (ref 0.5–2.2)
LEUKOCYTE ESTERASE, URINE: NEGATIVE
LIPASE: 25 U/L (ref 12–95)
LYMPHOCYTES ABSOLUTE: 1.1 K/UL (ref 1–4.8)
LYMPHOCYTES RELATIVE PERCENT: 15.6 %
MCH RBC QN AUTO: 31.6 PG (ref 27–31.3)
MCHC RBC AUTO-ENTMCNC: 33.9 % (ref 33–37)
MCV RBC AUTO: 93.3 FL (ref 82–100)
MONOCYTES ABSOLUTE: 0.5 K/UL (ref 0.2–0.8)
MONOCYTES RELATIVE PERCENT: 7.5 %
NEUTROPHILS ABSOLUTE: 5.5 K/UL (ref 1.4–6.5)
NEUTROPHILS RELATIVE PERCENT: 75.7 %
NITRITE, URINE: NEGATIVE
PDW BLD-RTO: 12.6 % (ref 11.5–14.5)
PH UA: 5 (ref 5–9)
PLATELET # BLD: 218 K/UL (ref 130–400)
POTASSIUM SERPL-SCNC: 4.7 MEQ/L (ref 3.4–4.9)
PROTEIN UA: NEGATIVE MG/DL
RBC # BLD: 4.42 M/UL (ref 4.2–5.4)
RBC UA: ABNORMAL /HPF (ref 0–5)
SODIUM BLD-SCNC: 140 MEQ/L (ref 135–144)
SPECIFIC GRAVITY UA: 1.02 (ref 1–1.03)
TOTAL PROTEIN: 7.1 G/DL (ref 6.3–8)
URINE REFLEX TO CULTURE: ABNORMAL
UROBILINOGEN, URINE: 0.2 E.U./DL
WBC # BLD: 7.2 K/UL (ref 4.8–10.8)
WBC UA: ABNORMAL /HPF (ref 0–5)

## 2022-04-12 PROCEDURE — 83690 ASSAY OF LIPASE: CPT

## 2022-04-12 PROCEDURE — 80053 COMPREHEN METABOLIC PANEL: CPT

## 2022-04-12 PROCEDURE — 36415 COLL VENOUS BLD VENIPUNCTURE: CPT

## 2022-04-12 PROCEDURE — 85025 COMPLETE CBC W/AUTO DIFF WBC: CPT

## 2022-04-12 PROCEDURE — 6370000000 HC RX 637 (ALT 250 FOR IP): Performed by: PHYSICIAN ASSISTANT

## 2022-04-12 PROCEDURE — 6360000002 HC RX W HCPCS: Performed by: PHYSICIAN ASSISTANT

## 2022-04-12 PROCEDURE — 99285 EMERGENCY DEPT VISIT HI MDM: CPT

## 2022-04-12 PROCEDURE — 96374 THER/PROPH/DIAG INJ IV PUSH: CPT

## 2022-04-12 PROCEDURE — 96375 TX/PRO/DX INJ NEW DRUG ADDON: CPT

## 2022-04-12 PROCEDURE — 74176 CT ABD & PELVIS W/O CONTRAST: CPT

## 2022-04-12 PROCEDURE — 81001 URINALYSIS AUTO W/SCOPE: CPT

## 2022-04-12 PROCEDURE — 2580000003 HC RX 258: Performed by: PHYSICIAN ASSISTANT

## 2022-04-12 PROCEDURE — 83605 ASSAY OF LACTIC ACID: CPT

## 2022-04-12 RX ORDER — TAMSULOSIN HYDROCHLORIDE 0.4 MG/1
0.4 CAPSULE ORAL DAILY
Qty: 5 CAPSULE | Refills: 0 | Status: SHIPPED | OUTPATIENT
Start: 2022-04-12 | End: 2022-06-13

## 2022-04-12 RX ORDER — ONDANSETRON 2 MG/ML
4 INJECTION INTRAMUSCULAR; INTRAVENOUS ONCE
Status: COMPLETED | OUTPATIENT
Start: 2022-04-12 | End: 2022-04-12

## 2022-04-12 RX ORDER — OXYCODONE HYDROCHLORIDE AND ACETAMINOPHEN 5; 325 MG/1; MG/1
1 TABLET ORAL EVERY 6 HOURS PRN
Qty: 12 TABLET | Refills: 0 | Status: SHIPPED | OUTPATIENT
Start: 2022-04-12 | End: 2022-04-15

## 2022-04-12 RX ORDER — KETOROLAC TROMETHAMINE 15 MG/ML
15 INJECTION, SOLUTION INTRAMUSCULAR; INTRAVENOUS ONCE
Status: COMPLETED | OUTPATIENT
Start: 2022-04-12 | End: 2022-04-12

## 2022-04-12 RX ORDER — MORPHINE SULFATE 4 MG/ML
4 INJECTION, SOLUTION INTRAMUSCULAR; INTRAVENOUS ONCE
Status: COMPLETED | OUTPATIENT
Start: 2022-04-12 | End: 2022-04-12

## 2022-04-12 RX ORDER — 0.9 % SODIUM CHLORIDE 0.9 %
1000 INTRAVENOUS SOLUTION INTRAVENOUS ONCE
Status: COMPLETED | OUTPATIENT
Start: 2022-04-12 | End: 2022-04-12

## 2022-04-12 RX ORDER — TAMSULOSIN HYDROCHLORIDE 0.4 MG/1
0.4 CAPSULE ORAL ONCE
Status: COMPLETED | OUTPATIENT
Start: 2022-04-12 | End: 2022-04-12

## 2022-04-12 RX ORDER — ONDANSETRON 4 MG/1
4 TABLET, ORALLY DISINTEGRATING ORAL EVERY 8 HOURS PRN
Qty: 20 TABLET | Refills: 0 | Status: SHIPPED | OUTPATIENT
Start: 2022-04-12 | End: 2022-06-13

## 2022-04-12 RX ADMIN — MORPHINE SULFATE 4 MG: 4 INJECTION, SOLUTION INTRAMUSCULAR; INTRAVENOUS at 06:36

## 2022-04-12 RX ADMIN — PROMETHAZINE HYDROCHLORIDE 25 MG: 25 INJECTION, SOLUTION INTRAMUSCULAR; INTRAVENOUS at 07:31

## 2022-04-12 RX ADMIN — KETOROLAC TROMETHAMINE 15 MG: 15 INJECTION, SOLUTION INTRAMUSCULAR; INTRAVENOUS at 07:24

## 2022-04-12 RX ADMIN — TAMSULOSIN HYDROCHLORIDE 0.4 MG: 0.4 CAPSULE ORAL at 08:40

## 2022-04-12 RX ADMIN — SODIUM CHLORIDE 1000 ML: 9 INJECTION, SOLUTION INTRAVENOUS at 06:35

## 2022-04-12 RX ADMIN — ONDANSETRON 4 MG: 2 INJECTION INTRAMUSCULAR; INTRAVENOUS at 06:36

## 2022-04-12 ASSESSMENT — ENCOUNTER SYMPTOMS
EYE DISCHARGE: 0
SORE THROAT: 0
RHINORRHEA: 0
ABDOMINAL DISTENTION: 0
SHORTNESS OF BREATH: 0
ABDOMINAL PAIN: 0
BACK PAIN: 1
COLOR CHANGE: 0
CONSTIPATION: 0

## 2022-04-12 ASSESSMENT — PAIN SCALES - GENERAL
PAINLEVEL_OUTOF10: 9
PAINLEVEL_OUTOF10: 0
PAINLEVEL_OUTOF10: 9
PAINLEVEL_OUTOF10: 9

## 2022-04-12 ASSESSMENT — PAIN DESCRIPTION - PROGRESSION: CLINICAL_PROGRESSION: RESOLVED

## 2022-04-12 ASSESSMENT — PAIN - FUNCTIONAL ASSESSMENT: PAIN_FUNCTIONAL_ASSESSMENT: 0-10

## 2022-04-12 NOTE — ED PROVIDER NOTES
[address] [facility] ED  eMERGENCY dEPARTMENT eNCOUnter      Pt Name: Aakash Kimbrough  MRN: [id number]  [place] 1995  Date of evaluation: 4/12/2022  Provider: Vicenta Ordonez PA-C    CHIEF COMPLAINT       Chief Complaint   Patient presents with    Flank Pain         HISTORY OF PRESENT ILLNESS   (Location/Symptom, Timing/Onset,Context/Setting, Quality, Duration, Modifying Factors, Severity)  Note limiting factors. Aakash Kimbrough is a 23 y.o. female who presents to the emergency department with complaint of left-sided flank pain which patient states started again last evening for her. Patient states 3/19/2022, she was seen in the emergency department for left flank pain with a diagnosis of 5 mm kidney stone, she states that as far she knows she did not pass the stone, she has not followed up with urology since previous visit, positive nausea vomiting, no difficulty with urination, no hematuria, no fevers. Patient rates her current pain at this time is a 9 out of 10. There is no radiation of pain. Past medical history significant for nephrolithiasis, Hodgkin's lymphoma. HPI    NursingNotes were reviewed. REVIEW OF SYSTEMS    (2-9 systems for level 4, 10 or more for level 5)     Review of Systems   Constitutional: Negative for activity change and appetite change. HENT: Negative for congestion, ear discharge, ear pain, nosebleeds, rhinorrhea and sore throat. Eyes: Negative for discharge. Respiratory: Negative for shortness of breath. Cardiovascular: Negative for chest pain, palpitations and leg swelling. Gastrointestinal: Negative for abdominal distention, abdominal pain and constipation. Genitourinary: Positive for flank pain. Negative for decreased urine volume, difficulty urinating, dysuria, urgency, vaginal bleeding and vaginal pain. Musculoskeletal: Positive for back pain. Negative for arthralgias, neck pain and neck stiffness.    Skin: Negative for color change, pallor and wound. Neurological: Negative for dizziness, tremors, syncope, weakness, numbness and headaches. Psychiatric/Behavioral: Negative for agitation and confusion. Except as noted above the remainder of the review of systems was reviewed and negative.        PAST MEDICAL HISTORY     Past Medical History:   Diagnosis Date    Cough variant asthma 2/6/2014    Hodgkin disease ([facility] [address]     Hodgkin's    Kidney stone          SURGICALHISTORY       Past Surgical History:   Procedure Laterality Date    BREAST FIBROADENOMA SURGERY  01/2013    TYMPANOSTOMY TUBE PLACEMENT  1997         CURRENT MEDICATIONS       Previous Medications    ONDANSETRON (ZOFRAN ODT) 4 MG DISINTEGRATING TABLET    Take 1 tablet by mouth every 8 hours as needed for Nausea    TAMSULOSIN (FLOMAX) 0.4 MG CAPSULE    Take 1 capsule by mouth daily    ZAFEMY 150-35 MCG/24HR    apply 1 patch and replace weekly for 3 weeks       ALLERGIES     Penicillins    FAMILY HISTORY       Family History   Problem Relation Age of Onset    High Blood Pressure Mother     High Blood Pressure Father     High Blood Pressure Maternal Aunt     High Blood Pressure Maternal Grandmother     High Blood Pressure Maternal Grandfather     Diabetes Other         mom's side    Cancer Neg Hx     Breast Cancer Neg Hx     Prostate Cancer Neg Hx     Depression Neg Hx           SOCIAL HISTORY       Social History     Socioeconomic History    Marital status:      Spouse name: None    Number of children: None    Years of education: None    Highest education level: None   Occupational History    None   Tobacco Use    Smoking status: Never Smoker    Smokeless tobacco: Never Used   Vaping Use    Vaping Use: Never used   Substance and Sexual Activity    Alcohol use: No    Drug use: No    Sexual activity: Yes     Partners: Male   Other Topics Concern    None   Social History Narrative    None     Social Determinants of Health     Financial Resource Strain:     Difficulty of Paying Living Expenses: Not on file   Food Insecurity: No Food Insecurity    Worried About Running Out of Food in the Last Year: Never true    Ran Out of Food in the Last Year: Never true   Transportation Needs:     Lack of Transportation (Medical): Not on file    Lack of Transportation (Non-Medical): Not on file   Physical Activity:     Days of Exercise per Week: Not on file    Minutes of Exercise per Session: Not on file   Stress:     Feeling of Stress : Not on file   Social Connections:     Frequency of Communication with Friends and Family: Not on file    Frequency of Social Gatherings with Friends and Family: Not on file    Attends Jain Services: Not on file    Active Member of [address] [place] [address] [employer] or Organizations: Not on file    Attends Club or Organization Meetings: Not on file    Marital Status: Not on file   Intimate Partner Violence:     Fear of Current or Ex-Partner: Not on file    Emotionally Abused: Not on file    Physically Abused: Not on file    Sexually Abused: Not on file   Housing Stability:     Unable to Pay for Housing in the Last Year: Not on file    Number of [place] in the Last Year: Not on file    Unstable Housing in the Last Year: Not on file       SCREENINGS    [place] Coma Scale  Eye Opening: Spontaneous  Best Verbal Response: Oriented  Best Motor Response: Obeys commands  [place] Coma Scale Score: 15 @FLOW([id number])@      PHYSICAL EXAM    (up to 7 for level 4, 8 or more for level 5)     ED Triage Vitals [04/12/22 0609]   BP Temp Temp Source Pulse Resp SpO2 Height Weight   (!) 130/94 98.1 °F (36.7 °C) Oral 108 16 97 % 5' 3\" (1.6 m) 110 lb (49.9 kg)       Physical Exam  Vitals and nursing note reviewed. Constitutional:       General: She is not in acute distress. Appearance: She is well-developed. She is not ill-appearing, toxic-appearing or diaphoretic. HENT:      Head: Normocephalic. Nose: Nose normal. No congestion.       Mouth/Throat: Mouth: Mucous membranes are moist.      Pharynx: No oropharyngeal exudate or posterior oropharyngeal erythema. Eyes:      Extraocular Movements: Extraocular movements intact. Conjunctiva/sclera: Conjunctivae normal.      Pupils: Pupils are equal, round, and reactive to light. Neck:      Vascular: No JVD. Trachea: No tracheal deviation. Cardiovascular:      Rate and Rhythm: Normal rate. Pulses: Normal pulses. Heart sounds: Normal heart sounds. No murmur heard. No friction rub. No gallop. Pulmonary:      Effort: Pulmonary effort is normal. No tachypnea, accessory muscle usage, respiratory distress or retractions. Breath sounds: No stridor. No wheezing, rhonchi or rales. Chest:      Chest wall: No tenderness. Abdominal:      General: Abdomen is flat. Bowel sounds are normal. There is no distension or abdominal bruit. Palpations: There is no shifting dullness, fluid wave, hepatomegaly, splenomegaly, mass or pulsatile mass. Tenderness: There is no abdominal tenderness. There is no right CVA tenderness, left CVA tenderness, guarding or rebound. Negative signs include Meraz's sign, Rovsing's sign and McBurney's sign. Comments: Abdomen soft nondistended nontender no guarding mass rebound, no CVA tenderness. Musculoskeletal:         General: No deformity. Cervical back: Normal range of motion and neck supple. No rigidity. Back:    Skin:     General: Skin is warm and dry. Capillary Refill: Capillary refill takes less than 2 seconds. Coloration: Skin is not jaundiced. Neurological:      General: No focal deficit present. Mental Status: She is alert and oriented to person, place, and time. Mental status is at baseline. Cranial Nerves: No cranial nerve deficit. Sensory: No sensory deficit. Motor: No weakness.       Coordination: Coordination normal.   Psychiatric:         Mood and Affect: Mood normal.         DIAGNOSTIC RESULTS EKG: All EKG's are interpreted by the Emergency Department Physician who either signs or Co-signsthis chart in the absence of a cardiologist.        RADIOLOGY:   Veronia Mage such as CT, Ultrasound and MRI are read by the radiologist. Plain radiographic images are visualized and preliminarily interpreted by the emergency physician with the below findings:        Interpretation per the Radiologist below, if available at the time ofthis note:    CT ABDOMEN PELVIS WO CONTRAST Additional Contrast? None   Final Result      6 MM LEFT UVJ CALCULUS WITH MODERATE HYDRONEPHROSIS, LIKELY MIGRATED CALCULUS FROM PRIOR CT.                  ED BEDSIDE ULTRASOUND:   Performed by ED Physician - none    LABS:  Labs Reviewed   COMPREHENSIVE METABOLIC PANEL - Abnormal; Notable for the following components:       Result Value    Glucose 101 (*)     BUN 26 (*)     CREATININE 1.30 (*)     GFR Non- 49.7 (*)     All other components within normal limits   CBC WITH AUTO DIFFERENTIAL - Abnormal; Notable for the following components:    MCH 31.6 (*)     All other components within normal limits   URINALYSIS WITH REFLEX TO CULTURE - Abnormal; Notable for the following components:    Ketones, Urine TRACE (*)     Blood, Urine LARGE (*)     All other components within normal limits   MICROSCOPIC URINALYSIS - Abnormal; Notable for the following components:    RBC, UA 20-50 (*)     All other components within normal limits   LACTIC ACID   LIPASE       All other labs were within normal range or not returned as of this dictation.     EMERGENCY DEPARTMENT COURSE and DIFFERENTIAL DIAGNOSIS/MDM:   Vitals:    Vitals:    04/12/22 0609 04/12/22 0729 04/12/22 0800   BP: (!) 130/94 (!) 140/95 (!) 133/94   Pulse: 108 50 55   Resp: 16 16 16   Temp: 98.1 °F (36.7 °C)     TempSrc: Oral     SpO2: 97% 100% 100%   Weight: 110 lb (49.9 kg)     Height: 5' 3\" (1.6 m)            MDM  Number of Diagnoses or Management Options  Diagnosis management comments: Patient present emerged from complaint of left-sided flank pain which started last evening for her, she had also been seen on 3/19/2022 for similar diagnosis with a 5 mm stone in the left UVJ. With urology, but never scheduled a follow-up appointment. She states pain returned last evening progressively got worse. Scan today shows a 6 mm stone at the left UVJ with some moderate hydronephrosis of the left kidney, there is some mild changes in kidney function with a BUN of 26 and creatinine of 1.3 this is up from patient's previous studies. She does not show signs for urinary tract infection, she is not tachycardic, and white count is 7.2 thousand. I did speak with urology Dr. Joseph Fraser, at this time he felt if patient's pain was controlled she could go home and schedule outpatient procedure for potential stent. Patient will be sent home with a prescription for Percocet, Zofran, Flomax, and referral to Dr. Joseph Fraser of urology for follow-up in the next 2 to 3 days. CRITICAL CARE TIME   Total Critical Care time was 0 minutes, excluding separately reportableprocedures. There was a high probability of clinicallysignificant/life threatening deterioration in the patient's condition which required my urgent intervention. CONSULTS:  None    PROCEDURES:  Unless otherwise noted below, none     Procedures    FINAL IMPRESSION    No diagnosis found. DISPOSITION/PLAN   DISPOSITION        PATIENT REFERRED TO:  No follow-up provider specified.     DISCHARGE MEDICATIONS:  New Prescriptions    No medications on file          (Please note that portions of this note were completed with a voice recognition program.  Efforts were made to edit the dictations but occasionally words are mis-transcribed.)    Daniela Bolaños PA-C (electronically signed)  Attending Emergency Physician         Daniela Bolaños PA-C  04/16/22 6269

## 2022-04-12 NOTE — ED NOTES
Discharge instructions reviewed with patient. Medications reviewed and explained to patient. Patient denies any further questions at this time. Pt encouraged to make follow up appointments with PCP and any speciality referrals.         Earl Vernon RN  04/12/22 5489

## 2022-04-12 NOTE — ED TRIAGE NOTES
Left flank pain, kidney stone diagnosed here 2 weeks ago, patient has been maintaining at home with percocet, zofran, and flomax but is now vomiting and unable to keep pills down, pain is 9/10

## 2022-04-14 ENCOUNTER — HOSPITAL ENCOUNTER (OUTPATIENT)
Dept: GENERAL RADIOLOGY | Age: 27
Discharge: HOME | End: 2022-04-16
Payer: COMMERCIAL

## 2022-04-14 ENCOUNTER — OFFICE VISIT (OUTPATIENT)
Dept: UROLOGY | Age: 27
End: 2022-04-14
Payer: COMMERCIAL

## 2022-04-14 VITALS
HEIGHT: 63 IN | SYSTOLIC BLOOD PRESSURE: 112 MMHG | DIASTOLIC BLOOD PRESSURE: 82 MMHG | HEART RATE: 98 BPM | BODY MASS INDEX: 19.49 KG/M2 | WEIGHT: 110 LBS | OXYGEN SATURATION: 99 %

## 2022-04-14 DIAGNOSIS — N20.0 KIDNEY STONE: Primary | ICD-10-CM

## 2022-04-14 DIAGNOSIS — N20.0 KIDNEY STONE: ICD-10-CM

## 2022-04-14 PROCEDURE — G8427 DOCREV CUR MEDS BY ELIG CLIN: HCPCS | Performed by: UROLOGY

## 2022-04-14 PROCEDURE — 74018 RADEX ABDOMEN 1 VIEW: CPT

## 2022-04-14 PROCEDURE — 1036F TOBACCO NON-USER: CPT | Performed by: UROLOGY

## 2022-04-14 PROCEDURE — 99203 OFFICE O/P NEW LOW 30 MIN: CPT | Performed by: UROLOGY

## 2022-04-14 PROCEDURE — G8420 CALC BMI NORM PARAMETERS: HCPCS | Performed by: UROLOGY

## 2022-04-14 NOTE — PROGRESS NOTES
[facility] EVALUATION NOTE                                                 H&P                                                                                                                                                 Reason for Visit  Distal ureteral calculus left    History of Present Illness  20-year-old female with recurrent calculi patient has passed 6 stones since the age of 22  Patient currently has a stone by the left ureterovesical junction  She wants to continue with propulsive therapy      Urologic Review of Systems/Symptoms  Denies fevers or hematuria    Review of Systems  Hospitalization: None recent  All 14 categories of Review of Systems otherwise reviewed no other findings reported. Denies fevers  Past Medical History:   Diagnosis Date    Cough variant asthma 2/6/2014    Hodgkin disease ([address]     Hodgkin's    Kidney stone      Past Surgical History:   Procedure Laterality Date    BREAST FIBROADENOMA SURGERY  01/2013   [facility] DENTAL SURGERY  01/2022    all wisdom teeth removed     TYMPANOSTOMY TUBE PLACEMENT  1997     Social History     Socioeconomic History    Marital status:      Spouse name: None    Number of children: None    Years of education: None    Highest education level: None   Occupational History    None   Tobacco Use    Smoking status: Never Smoker    Smokeless tobacco: Never Used   Vaping Use    Vaping Use: Never used   Substance and Sexual Activity    Alcohol use: No    Drug use: No    Sexual activity: Yes     Partners: Male   Other Topics Concern    None   Social History Narrative    None     Social Determinants of Health     Financial Resource Strain:     Difficulty of Paying Living Expenses: Not on file   Food Insecurity: No Food Insecurity    Worried About Running Out of Food in the Last Year: Never true    Bridget of Food in the Last Year: Never true   Transportation Needs:     Lack of Transportation (Medical):  Not on file    Lack of Transportation (Non-Medical): Not on file   Physical Activity:     Days of Exercise per Week: Not on file    Minutes of Exercise per Session: Not on file   Stress:     Feeling of Stress : Not on file   Social Connections:     Frequency of Communication with Friends and Family: Not on file    Frequency of Social Gatherings with Friends and Family: Not on file    Attends Mormon Services: Not on file    Active Member of [address] [place] [address] [employer] or Organizations: Not on file    Attends Club or Organization Meetings: Not on file    Marital Status: Not on file   Intimate Partner Violence:     Fear of Current or Ex-Partner: Not on file    Emotionally Abused: Not on file    Physically Abused: Not on file    Sexually Abused: Not on file   Housing Stability:     Unable to Pay for Housing in the Last Year: Not on file    Number of [place] in the Last Year: Not on file    Unstable Housing in the Last Year: Not on file     Family History   Problem Relation Age of Onset    High Blood Pressure Mother     High Blood Pressure Father     High Blood Pressure Maternal Aunt     High Blood Pressure Maternal Grandmother     High Blood Pressure Maternal Grandfather     Diabetes Other         mom's side    Cancer Neg Hx     Breast Cancer Neg Hx     Prostate Cancer Neg Hx     Depression Neg Hx      Current Outpatient Medications   Medication Sig Dispense Refill    oxyCODONE-acetaminophen (PERCOCET) 5-325 MG per tablet Take 1 tablet by mouth every 6 hours as needed for Pain for up to 3 days. Intended supply: 3 days. Take lowest dose possible to manage pain 12 tablet 0    ondansetron (ZOFRAN ODT) 4 MG disintegrating tablet Take 1 tablet by mouth every 8 hours as needed for Nausea 20 tablet 0    tamsulosin (FLOMAX) 0.4 MG capsule Take 1 capsule by mouth daily for 5 doses 5 capsule 0     No current facility-administered medications for this visit.      Penicillins  All reviewed and verified by Dr Kathy Lanza on today's visit    No results found for: PSA, PSADIA  No results found for this visit on 04/14/22. Physical Exam  Vitals:    04/14/22 0824   BP: 112/82   Pulse: 98   SpO2: 99%   Weight: 110 lb (49.9 kg)   Height: 5' 3\" (1.6 m)     Constitutional: Not in distress  Physical exam otherwise unremarkable  CT KUB reviewed. Assessment/Medical Necessity-Decision Making  Distal left ureteral calculus  Plan  Patient will continue propulsive therapy  She will notify me if she continues to have pain  Patient was also given information about dietary restrictions  Greater than 50% of 30 minutes spent consulting patient face-to-face  No orders of the defined types were placed in this encounter. No orders of the defined types were placed in this encounter. Mendy Sandhu MD       Please note this report has been partially produced using speech recognition software  And may cause contain errors related to that system including grammar, punctuation and spelling as well as words and phrases that may seem inappropriate. If there are questions or concerns please feel free to contact me to clarify.

## 2022-04-21 ENCOUNTER — HOSPITAL ENCOUNTER (EMERGENCY)
Age: 27
Discharge: HOME | End: 2022-04-21
Payer: COMMERCIAL

## 2022-04-21 ENCOUNTER — APPOINTMENT (OUTPATIENT)
Dept: GENERAL RADIOLOGY | Age: 27
End: 2022-04-21
Payer: COMMERCIAL

## 2022-04-21 VITALS
OXYGEN SATURATION: 100 % | DIASTOLIC BLOOD PRESSURE: 84 MMHG | SYSTOLIC BLOOD PRESSURE: 122 MMHG | HEIGHT: 63 IN | WEIGHT: 108 LBS | TEMPERATURE: 96.2 F | RESPIRATION RATE: 16 BRPM | HEART RATE: 87 BPM | BODY MASS INDEX: 19.14 KG/M2

## 2022-04-21 DIAGNOSIS — R10.9 LEFT FLANK PAIN: Primary | ICD-10-CM

## 2022-04-21 LAB
ALBUMIN SERPL-MCNC: 4.4 G/DL (ref 3.5–4.6)
ALP BLD-CCNC: 81 U/L (ref 40–130)
ALT SERPL-CCNC: 12 U/L (ref 0–33)
ANION GAP SERPL CALCULATED.3IONS-SCNC: 13 MEQ/L (ref 9–15)
AST SERPL-CCNC: 18 U/L (ref 0–35)
BASOPHILS ABSOLUTE: 0 K/UL (ref 0–0.2)
BASOPHILS RELATIVE PERCENT: 0.6 %
BILIRUB SERPL-MCNC: 0.4 MG/DL (ref 0.2–0.7)
BILIRUBIN URINE: NEGATIVE
BLOOD, URINE: NEGATIVE
BUN BLDV-MCNC: 25 MG/DL (ref 6–20)
CALCIUM SERPL-MCNC: 9.5 MG/DL (ref 8.5–9.9)
CHLORIDE BLD-SCNC: 102 MEQ/L (ref 95–107)
CLARITY: CLEAR
CO2: 22 MEQ/L (ref 20–31)
COLOR: YELLOW
CREAT SERPL-MCNC: 0.63 MG/DL (ref 0.5–0.9)
EOSINOPHILS ABSOLUTE: 0.1 K/UL (ref 0–0.7)
EOSINOPHILS RELATIVE PERCENT: 1.6 %
GFR AFRICAN AMERICAN: >60
GFR NON-AFRICAN AMERICAN: >60
GLOBULIN: 2.7 G/DL (ref 2.3–3.5)
GLUCOSE BLD-MCNC: 88 MG/DL (ref 70–99)
GLUCOSE URINE: NEGATIVE MG/DL
HCT VFR BLD CALC: 41 % (ref 37–47)
HEMOGLOBIN: 13.8 G/DL (ref 12–16)
KETONES, URINE: NEGATIVE MG/DL
LEUKOCYTE ESTERASE, URINE: NEGATIVE
LYMPHOCYTES ABSOLUTE: 1.8 K/UL (ref 1–4.8)
LYMPHOCYTES RELATIVE PERCENT: 42.9 %
MCH RBC QN AUTO: 31.5 PG (ref 27–31.3)
MCHC RBC AUTO-ENTMCNC: 33.7 % (ref 33–37)
MCV RBC AUTO: 93.5 FL (ref 82–100)
MONOCYTES ABSOLUTE: 0.3 K/UL (ref 0.2–0.8)
MONOCYTES RELATIVE PERCENT: 6.5 %
NEUTROPHILS ABSOLUTE: 2.1 K/UL (ref 1.4–6.5)
NEUTROPHILS RELATIVE PERCENT: 48.4 %
NITRITE, URINE: NEGATIVE
PDW BLD-RTO: 12 % (ref 11.5–14.5)
PH UA: 5 (ref 5–9)
PLATELET # BLD: 295 K/UL (ref 130–400)
POTASSIUM SERPL-SCNC: 4.1 MEQ/L (ref 3.4–4.9)
PROTEIN UA: NEGATIVE MG/DL
RBC # BLD: 4.39 M/UL (ref 4.2–5.4)
SODIUM BLD-SCNC: 137 MEQ/L (ref 135–144)
SPECIFIC GRAVITY UA: 1.02 (ref 1–1.03)
TOTAL PROTEIN: 7.1 G/DL (ref 6.3–8)
URINE REFLEX TO CULTURE: NORMAL
UROBILINOGEN, URINE: 0.2 E.U./DL
WBC # BLD: 4.3 K/UL (ref 4.8–10.8)

## 2022-04-21 PROCEDURE — 85025 COMPLETE CBC W/AUTO DIFF WBC: CPT

## 2022-04-21 PROCEDURE — 81003 URINALYSIS AUTO W/O SCOPE: CPT

## 2022-04-21 PROCEDURE — 96374 THER/PROPH/DIAG INJ IV PUSH: CPT

## 2022-04-21 PROCEDURE — 99284 EMERGENCY DEPT VISIT MOD MDM: CPT

## 2022-04-21 PROCEDURE — 6360000002 HC RX W HCPCS: Performed by: PHYSICIAN ASSISTANT

## 2022-04-21 PROCEDURE — 36415 COLL VENOUS BLD VENIPUNCTURE: CPT

## 2022-04-21 PROCEDURE — 74018 RADEX ABDOMEN 1 VIEW: CPT

## 2022-04-21 PROCEDURE — 80053 COMPREHEN METABOLIC PANEL: CPT

## 2022-04-21 RX ORDER — KETOROLAC TROMETHAMINE 15 MG/ML
15 INJECTION, SOLUTION INTRAMUSCULAR; INTRAVENOUS ONCE
Status: COMPLETED | OUTPATIENT
Start: 2022-04-21 | End: 2022-04-21

## 2022-04-21 RX ADMIN — KETOROLAC TROMETHAMINE 15 MG: 15 INJECTION, SOLUTION INTRAMUSCULAR; INTRAVENOUS at 08:10

## 2022-04-21 ASSESSMENT — PAIN DESCRIPTION - LOCATION
LOCATION: FLANK
LOCATION: FLANK;ABDOMEN

## 2022-04-21 ASSESSMENT — ENCOUNTER SYMPTOMS
ABDOMINAL DISTENTION: 0
RHINORRHEA: 0
COLOR CHANGE: 0
ABDOMINAL PAIN: 0
BACK PAIN: 1
SHORTNESS OF BREATH: 0
EYE DISCHARGE: 0
SORE THROAT: 0
CONSTIPATION: 0

## 2022-04-21 ASSESSMENT — PAIN SCALES - GENERAL
PAINLEVEL_OUTOF10: 5
PAINLEVEL_OUTOF10: 5

## 2022-04-21 ASSESSMENT — PAIN DESCRIPTION - ORIENTATION
ORIENTATION: LEFT
ORIENTATION: LEFT

## 2022-04-21 ASSESSMENT — PAIN - FUNCTIONAL ASSESSMENT
PAIN_FUNCTIONAL_ASSESSMENT: ACTIVITIES ARE NOT PREVENTED
PAIN_FUNCTIONAL_ASSESSMENT: 0-10

## 2022-04-21 ASSESSMENT — PAIN DESCRIPTION - DESCRIPTORS
DESCRIPTORS: ACHING
DESCRIPTORS: ACHING;SHARP

## 2022-04-21 NOTE — ED PROVIDER NOTES
[address] [facility] ED  eMERGENCY dEPARTMENT eNCOUnter      Pt Name: Dinh Good  MRN: [id number]  [place] 1995  Date of evaluation: 4/21/2022  Provider: Sammie Mayorga PA-C    CHIEF COMPLAINT       Chief Complaint   Patient presents with    Flank Pain     left sided flank pain since last night with nausea. states \"I just got rid of kidney stones\"          HISTORY OF PRESENT ILLNESS   (Location/Symptom, Timing/Onset,Context/Setting, Quality, Duration, Modifying Factors, Severity)  Note limiting factors. Dinh Good is a 23 y.o. female who presents to the emergency department with complaint of left-sided flank pain which patient states started last evening for her, she states nausea at that time but none currently at this time. She states her pain is gotten better, she rates as a 5 out of 10, she states in the left abdomen rating to the left flank. She states she had just recently been diagnosed with a kidney stone which she had passed approximately 4 days ago, she had been recently followed by Dr. Karlene Bell of urology. Patient denies any current nausea or vomiting, no hematuria, no difficulty with urination, no constipation or diarrhea, no acute injury. Past medical history significant for Hodgkin's lymphoma, kidney stones. HPI    NursingNotes were reviewed. REVIEW OF SYSTEMS    (2-9 systems for level 4, 10 or more for level 5)     Review of Systems   Constitutional: Negative for activity change and appetite change. HENT: Negative for congestion, ear discharge, ear pain, nosebleeds, rhinorrhea and sore throat. Eyes: Negative for discharge. Respiratory: Negative for shortness of breath. Cardiovascular: Negative for chest pain, palpitations and leg swelling. Gastrointestinal: Negative for abdominal distention, abdominal pain and constipation. Genitourinary: Positive for flank pain. Negative for difficulty urinating and dysuria.         Left-sided abdomen and flank pain   Musculoskeletal: Positive for back pain. Negative for arthralgias. Skin: Negative for color change, pallor and rash. Neurological: Negative for dizziness, tremors, syncope, weakness, numbness and headaches. Psychiatric/Behavioral: Negative for agitation and confusion. Except as noted above the remainder of the review of systems was reviewed and negative.        PAST MEDICAL HISTORY     Past Medical History:   Diagnosis Date    Cough variant asthma 2/6/2014    Hodgkin disease ([facility] [address]     Hodgkin's    Kidney stone          SURGICALHISTORY       Past Surgical History:   Procedure Laterality Date    BREAST FIBROADENOMA SURGERY  01/2013   [facility] DENTAL SURGERY  01/2022    all wisdom teeth removed     TYMPANOSTOMY TUBE PLACEMENT  1997         CURRENT MEDICATIONS       Previous Medications    ONDANSETRON (ZOFRAN ODT) 4 MG DISINTEGRATING TABLET    Take 1 tablet by mouth every 8 hours as needed for Nausea    TAMSULOSIN (FLOMAX) 0.4 MG CAPSULE    Take 1 capsule by mouth daily for 5 doses       ALLERGIES     Penicillins    FAMILY HISTORY       Family History   Problem Relation Age of Onset    High Blood Pressure Mother     High Blood Pressure Father     High Blood Pressure Maternal Aunt     High Blood Pressure Maternal Grandmother     High Blood Pressure Maternal Grandfather     Diabetes Other         mom's side    Cancer Neg Hx     Breast Cancer Neg Hx     Prostate Cancer Neg Hx     Depression Neg Hx           SOCIAL HISTORY       Social History     Socioeconomic History    Marital status:      Spouse name: None    Number of children: None    Years of education: None    Highest education level: None   Occupational History    None   Tobacco Use    Smoking status: Never Smoker    Smokeless tobacco: Never Used   Vaping Use    Vaping Use: Never used   Substance and Sexual Activity    Alcohol use: Yes     Comment: rare    Drug use: No    Sexual activity: Yes     Partners: Male   Other Topics Concern    None   Social History Narrative    None     Social Determinants of Health     Financial Resource Strain:     Difficulty of Paying Living Expenses: Not on file   Food Insecurity: No Food Insecurity    Worried About Running Out of Food in the Last Year: Never true    Bridget of Food in the Last Year: Never true   Transportation Needs:     Lack of Transportation (Medical): Not on file    Lack of Transportation (Non-Medical): Not on file   Physical Activity:     Days of Exercise per Week: Not on file    Minutes of Exercise per Session: Not on file   Stress:     Feeling of Stress : Not on file   Social Connections:     Frequency of Communication with Friends and Family: Not on file    Frequency of Social Gatherings with Friends and Family: Not on file    Attends Yazidism Services: Not on file    Active Member of [address] [place] [address] [employer] or Organizations: Not on file    Attends Club or Organization Meetings: Not on file    Marital Status: Not on file   Intimate Partner Violence:     Fear of Current or Ex-Partner: Not on file    Emotionally Abused: Not on file    Physically Abused: Not on file    Sexually Abused: Not on file   Housing Stability:     Unable to Pay for Housing in the Last Year: Not on file    Number of [place] in the Last Year: Not on file    Unstable Housing in the Last Year: Not on file       SCREENINGS    Fowler Coma Scale  Eye Opening: Spontaneous  Best Verbal Response: Oriented  Best Motor Response: Obeys commands  Scarlett Coma Scale Score: 15 @FLOW([id number])@      PHYSICAL EXAM    (up to 7 for level 4, 8 or more for level 5)     ED Triage Vitals [04/21/22 0747]   BP Temp Temp Source Pulse Resp SpO2 Height Weight   122/84 96.2 °F (35.7 °C) Temporal 87 16 100 % 5' 3\" (1.6 m) 108 lb (49 kg)       Physical Exam  Vitals and nursing note reviewed. Constitutional:       General: She is not in acute distress. Appearance: Normal appearance. She is well-developed.  She is not ill-appearing, toxic-appearing or diaphoretic. Comments: Nontoxic appearance appears no acute distress   HENT:      Head: Normocephalic. Right Ear: Tympanic membrane normal.      Left Ear: Tympanic membrane normal.      Nose: No congestion. Mouth/Throat:      Mouth: Mucous membranes are moist.      Pharynx: No oropharyngeal exudate or posterior oropharyngeal erythema. Eyes:      Extraocular Movements: Extraocular movements intact. Conjunctiva/sclera: Conjunctivae normal.      Pupils: Pupils are equal, round, and reactive to light. Neck:      Vascular: No JVD. Trachea: No tracheal deviation. Cardiovascular:      Rate and Rhythm: Normal rate. Pulses: Normal pulses. Heart sounds: Normal heart sounds. No murmur heard. No friction rub. No gallop. Pulmonary:      Effort: Pulmonary effort is normal. No tachypnea, accessory muscle usage, respiratory distress or retractions. Breath sounds: Normal breath sounds. No stridor. No wheezing, rhonchi or rales. Chest:      Chest wall: No tenderness. Abdominal:      General: Abdomen is flat. Bowel sounds are normal. There is no distension or abdominal bruit. Palpations: Abdomen is soft. There is no shifting dullness, fluid wave, hepatomegaly, splenomegaly, mass or pulsatile mass. Tenderness: There is no abdominal tenderness. There is no right CVA tenderness, left CVA tenderness, guarding or rebound. Negative signs include Meraz's sign, Rovsing's sign and McBurney's sign. Comments: Abdomen soft nondistended nontender no guarding mass rebound, no CVA tenderness. Musculoskeletal:         General: No deformity. Cervical back: Normal range of motion and neck supple. No rigidity. Skin:     General: Skin is warm and dry. Capillary Refill: Capillary refill takes less than 2 seconds. Coloration: Skin is not jaundiced. Neurological:      General: No focal deficit present.       Mental Status: She is alert and oriented to person, place, and time. Mental status is at baseline. Cranial Nerves: No cranial nerve deficit. Sensory: No sensory deficit. Motor: No weakness. Coordination: Coordination normal.   Psychiatric:         Mood and Affect: Mood normal.         DIAGNOSTIC RESULTS     EKG: All EKG's are interpreted by the Emergency Department Physician who either signs or Co-signsthis chart in the absence of a cardiologist.        RADIOLOGY:   Toan Sharp such as CT, Ultrasound and MRI are read by the radiologist. Plain radiographic images are visualized and preliminarily interpreted by the emergency physician with the below findings:        Interpretation per the Radiologist below, if available at the time ofthis note:    XR ABDOMEN (KUB) (SINGLE AP VIEW)    (Results Pending)     KUB shows no acute intra-abdominal process, 6 mm stone previously seen in the left pelvis is not currently present at this time. There is no residual stones noted. No acute process. ED BEDSIDE ULTRASOUND:   Performed by ED Physician - none    LABS:  Labs Reviewed   COMPREHENSIVE METABOLIC PANEL - Abnormal; Notable for the following components:       Result Value    BUN 25 (*)     All other components within normal limits   CBC WITH AUTO DIFFERENTIAL - Abnormal; Notable for the following components:    WBC 4.3 (*)     MCH 31.5 (*)     All other components within normal limits   URINALYSIS WITH REFLEX TO CULTURE       All other labs were within normal range or not returned as of this dictation.     EMERGENCY DEPARTMENT COURSE and DIFFERENTIAL DIAGNOSIS/MDM:   Vitals:    Vitals:    04/21/22 0747   BP: 122/84   Pulse: 87   Resp: 16   Temp: 96.2 °F (35.7 °C)   TempSrc: Temporal   SpO2: 100%   Weight: 108 lb (49 kg)   Height: 5' 3\" (1.6 m)            MDM  Number of Diagnoses or Management Options  Left flank pain  Diagnosis management comments: Patient presented ED with complaint of left-sided flank pain which started last evening for her, patient had just recently passed a 6mm kidney stone from her left pelvic region, she was concerned that she has an additional, or residual stone. She is fairly comfortable on examination, no tenderness on examination to abdomen, or flank region. KUB was obtained, the 6 mm stone patient previously had at the [facility], is currently no longer present. No additional stones are noted. Renal function is improved from previous visit, with a BUN of 25 creatinine of 0.63 she was given Toradol and is very comfortable at this time. Remaining labs are fairly unimpressive. Patient's diagnosis nonspecific left flank pain. Urine shows no signs for infection, or blood. Patient was discharged, and advised to follow-up with her urologist in the next 2 to 3 days should she have continued residual pain. She is also recommended to follow-up with her regular family provider. And to return to the emergency department if there is any worsening or change in symptoms. CRITICAL CARE TIME   Total Critical Care time was 0 minutes, excluding separately reportableprocedures. There was a high probability of clinicallysignificant/life threatening deterioration in the patient's condition which required my urgent intervention. CONSULTS:  None    PROCEDURES:  Unless otherwise noted below, none     Procedures    FINAL IMPRESSION      1.  Left flank pain          DISPOSITION/PLAN   DISPOSITION Decision To Discharge 04/21/2022 09:01:37 AM      PATIENT REFERRED TO:  ADRIAN Resendez - CNP  [address], [address]  [facility] 46 [phone]    In 3 days        DISCHARGE MEDICATIONS:  New Prescriptions    No medications on file          (Please note that portions of this note were completed with a voice recognition program.  Efforts were made to edit the dictations but occasionally words are mis-transcribed.)    Vicenta Ordonez PA-C (electronically signed)  Attending Emergency Physician

## 2022-04-26 ENCOUNTER — TELEMEDICINE (OUTPATIENT)
Dept: FAMILY MEDICINE CLINIC | Age: 27
End: 2022-04-26
Payer: COMMERCIAL

## 2022-04-26 DIAGNOSIS — J06.9 ACUTE UPPER RESPIRATORY INFECTION: Primary | ICD-10-CM

## 2022-04-26 LAB
INFLUENZA A ANTIBODY: NORMAL
INFLUENZA B ANTIBODY: NORMAL
Lab: NORMAL
PERFORMING INSTRUMENT: NORMAL
QC PASS/FAIL: NORMAL
SARS-COV-2, POC: NORMAL

## 2022-04-26 PROCEDURE — 99213 OFFICE O/P EST LOW 20 MIN: CPT | Performed by: PHYSICIAN ASSISTANT

## 2022-04-26 PROCEDURE — 87804 INFLUENZA ASSAY W/OPTIC: CPT | Performed by: PHYSICIAN ASSISTANT

## 2022-04-26 PROCEDURE — 87426 SARSCOV CORONAVIRUS AG IA: CPT | Performed by: PHYSICIAN ASSISTANT

## 2022-04-26 RX ORDER — GUAIFENESIN AND DEXTROMETHORPHAN HYDROBROMIDE 600; 30 MG/1; MG/1
1 TABLET, EXTENDED RELEASE ORAL 2 TIMES DAILY
Qty: 14 TABLET | Refills: 0 | Status: SHIPPED | OUTPATIENT
Start: 2022-04-26 | End: 2022-05-03

## 2022-04-26 RX ORDER — MELATONIN
5000 DAILY
Qty: 35 TABLET | Refills: 0 | Status: SHIPPED | OUTPATIENT
Start: 2022-04-26 | End: 2022-06-13

## 2022-04-26 RX ORDER — AZITHROMYCIN 250 MG/1
TABLET, FILM COATED ORAL
Qty: 6 TABLET | Refills: 0 | Status: SHIPPED | OUTPATIENT
Start: 2022-04-26 | End: 2022-05-06

## 2022-04-26 ASSESSMENT — PATIENT HEALTH QUESTIONNAIRE - PHQ9
SUM OF ALL RESPONSES TO PHQ QUESTIONS 1-9: 0
SUM OF ALL RESPONSES TO PHQ9 QUESTIONS 1 & 2: 0
SUM OF ALL RESPONSES TO PHQ QUESTIONS 1-9: 0
1. LITTLE INTEREST OR PLEASURE IN DOING THINGS: 0
2. FEELING DOWN, DEPRESSED OR HOPELESS: 0

## 2022-04-26 ASSESSMENT — ENCOUNTER SYMPTOMS
GASTROINTESTINAL NEGATIVE: 1
COUGH: 1
EYES NEGATIVE: 1

## 2022-04-26 NOTE — PATIENT INSTRUCTIONS
Patient Education        Upper Respiratory Infection (Cold): Care Instructions  Your Care Instructions     An upper respiratory infection, or URI, is an infection of the nose, sinuses, or throat. URIs are spread by coughs, sneezes, and direct contact. The common cold is the most frequent kind of URI. The flu and sinus infections are otherkinds of URIs. Almost all URIs are caused by viruses. Antibiotics won't cure them. But you can treat most infections with home care. This may include drinking lots of fluids and taking over-the-counter pain medicine. You will probably feel better in 4to 10 days. The doctor has checked you carefully, but problems can develop later. If you notice any problems or new symptoms, get medical treatment right away. Follow-up care is a key part of your treatment and safety. Be sure to make and go to all appointments, and call your doctor if you are having problems. It's also a good idea to know your test results and keep alist of the medicines you take. How can you care for yourself at home?  To prevent dehydration, drink plenty of fluids. Choose water and other clear liquids until you feel better. If you have kidney, heart, or liver disease and have to limit fluids, talk with your doctor before you increase the amount of fluids you drink.  Take an over-the-counter pain medicine, such as acetaminophen (Tylenol), ibuprofen (Advil, Motrin), or naproxen (Aleve). Read and follow all instructions on the label.  Before you use cough and cold medicines, check the label. These medicines may not be safe for young children or for people with certain health problems.  Be careful when taking over-the-counter cold or flu medicines and Tylenol at the same time. Many of these medicines have acetaminophen, which is Tylenol. Read the labels to make sure that you are not taking more than the recommended dose. Too much acetaminophen (Tylenol) can be harmful.    Get plenty of rest.   Do not smoke or allow others to smoke around you. If you need help quitting, talk to your doctor about stop-smoking programs and medicines. These can increase your chances of quitting for good. When should you call for help? Call 911 anytime you think you may need emergency care. For example, call if:     You have severe trouble breathing. Call your doctor now or seek immediate medical care if:     You seem to be getting much sicker.      You have new or worse trouble breathing.      You have a new or higher fever.      You have a new rash. Watch closely for changes in your health, and be sure to contact your doctor if:     You have a new symptom, such as a sore throat, an earache, or sinus pain.      You cough more deeply or more often, especially if you notice more mucus or a change in the color of your mucus.      You do not get better as expected. Where can you learn more? Go to https://Channel [email or web address].[email or web address]. org and sign in to your [employer] account. Enter V008 in the Cloud[employer] box to learn more about \"Upper Respiratory Infection (Cold): Care Instructions. \"     If you do not have an account, please click on the \"Sign Up Now\" link. Current as of: July 6, 2021               Content Version: 13.2  © 2006-2022 Healthwise, Incorporated. Care instructions adapted under license by [facility] ([facility]). If you have questions about a medical condition or this instruction, always ask your healthcare professional. [place] [address] any warranty or liability for your use of this information.

## 2022-04-26 NOTE — PROGRESS NOTES
Janet Galloway (:  1995) is here for evaluation of 3 days of cough and congestion, right ear pain, and sore throat. No fever. He daughter who is 2 has had similar symptoms and then fever developed today. Patient denies NVD. Patient states she took an at home Covid test and it was negative. Patient states she has soreness on her chest that gets worse with cough. Assessment & Plan   Below is the assessment and plan developed based on review of pertinent history, physical exam, labs, studies, and medications. No follow-ups on file. Subjective   HPI  Review of Systems   Constitutional: Negative. HENT: Positive for congestion. Eyes: Negative. Respiratory: Positive for cough. Cardiovascular: Negative. Gastrointestinal: Negative. Endocrine: Negative. Genitourinary: Negative. Musculoskeletal: Negative. Skin: Negative. Neurological: Negative. Psychiatric/Behavioral: Negative. Objective   Patient-Reported Vitals  No data recorded     Physical Exam not performed due to virtual visit. Patient coming by the office to do curbside swab for COVID and influenza. COVID and flu influenza test are both negative. Patient will be started on zinc, vitamin D3, and Mucinex DM right away for treatment at home. Patient to start Z-Nolan if she is 7 days with symptoms and not showing any signs of improvement. Patient verbalizes understanding of plan and has no further questions. Seek emergent treatment if symptoms worsen. Janet Galloway, was evaluated through a synchronous (real-time) audio-video encounter. The patient (or guardian if applicable) is aware that this is a billable service, which includes applicable co-pays. This Virtual Visit was conducted with patient's (and/or legal guardian's) consent.  The visit was conducted pursuant to the emergency declaration under the [facility] waiver authority and the Coronavirus Preparedness and Response Supplemental Appropriations Act. Patient identification was verified, and a caregiver was present when appropriate. The patient was located at home in a state where the provider was licensed to provide care.        --Cyrus Martinez PA-C

## 2022-06-13 ENCOUNTER — OFFICE VISIT (OUTPATIENT)
Dept: OBGYN CLINIC | Age: 27
End: 2022-06-13
Payer: COMMERCIAL

## 2022-06-13 VITALS
DIASTOLIC BLOOD PRESSURE: 72 MMHG | SYSTOLIC BLOOD PRESSURE: 100 MMHG | BODY MASS INDEX: 19.31 KG/M2 | HEIGHT: 63 IN | WEIGHT: 109 LBS

## 2022-06-13 DIAGNOSIS — Z34.90 EARLY STAGE OF PREGNANCY: ICD-10-CM

## 2022-06-13 DIAGNOSIS — N91.2 AMENORRHEA: Primary | ICD-10-CM

## 2022-06-13 DIAGNOSIS — Z3A.01 5 WEEKS GESTATION OF PREGNANCY: ICD-10-CM

## 2022-06-13 DIAGNOSIS — N91.2 AMENORRHEA: ICD-10-CM

## 2022-06-13 LAB
HCG, URINE, POC: POSITIVE
Lab: NORMAL
NEGATIVE QC PASS/FAIL: NORMAL
POSITIVE QC PASS/FAIL: NORMAL

## 2022-06-13 PROCEDURE — G8427 DOCREV CUR MEDS BY ELIG CLIN: HCPCS | Performed by: OBSTETRICS & GYNECOLOGY

## 2022-06-13 PROCEDURE — 1036F TOBACCO NON-USER: CPT | Performed by: OBSTETRICS & GYNECOLOGY

## 2022-06-13 PROCEDURE — G8420 CALC BMI NORM PARAMETERS: HCPCS | Performed by: OBSTETRICS & GYNECOLOGY

## 2022-06-13 PROCEDURE — 99213 OFFICE O/P EST LOW 20 MIN: CPT | Performed by: OBSTETRICS & GYNECOLOGY

## 2022-06-13 PROCEDURE — 81025 URINE PREGNANCY TEST: CPT | Performed by: OBSTETRICS & GYNECOLOGY

## 2022-06-13 RX ORDER — ONDANSETRON 4 MG/1
4 TABLET, FILM COATED ORAL
Qty: 30 TABLET | Refills: 3 | Status: CANCELLED | OUTPATIENT
Start: 2022-06-13 | End: 2022-07-13

## 2022-06-13 NOTE — PROGRESS NOTES
SUBJECTIVE:   23 y.o.   female here to discuss positive pregnancy test. Pt denies any VB and pt without complaints today. Pt with h/o TSVD x 1. Review of Systems:  General ROS: negative  Respiratory ROS: no cough, shortness of breath, or wheezing  Cardiovascular ROS: no chest pain or dyspnea on exertion  Gastrointestinal ROS: no abdominal pain, change in bowel habits, or black or bloody stools  Genito-Urinary ROS: no dysuria, trouble voiding, or hematuria    OBJECTIVE:   /72   Ht 5' 3\" (1.6 m)   Wt 109 lb (49.4 kg)   LMP 2022   BMI 19.31 kg/m²     Physical Exam:  GEN: She appears well, afebrile. HEENT: normal cephalic, atraumatic  CVS: regular rate and rhythm  ABDOMEN: benign, soft, nontender, no masses.   MUSCULOSKELETAL: normal gait, no masses  SKIN: normal texture and tone, no lesions    ASSESSMENT:   Positive pregnancy test    PLAN:   UPT positive  Hcg pending  US pending  PT to f/u for IPV in 4wks

## 2022-06-14 ENCOUNTER — TELEPHONE (OUTPATIENT)
Dept: OBGYN CLINIC | Age: 27
End: 2022-06-14

## 2022-06-14 ENCOUNTER — HOSPITAL ENCOUNTER (EMERGENCY)
Age: 27
Discharge: HOME | End: 2022-06-14
Attending: EMERGENCY MEDICINE
Payer: COMMERCIAL

## 2022-06-14 ENCOUNTER — APPOINTMENT (OUTPATIENT)
Dept: ULTRASOUND IMAGING | Age: 27
End: 2022-06-14
Payer: COMMERCIAL

## 2022-06-14 VITALS
SYSTOLIC BLOOD PRESSURE: 103 MMHG | TEMPERATURE: 97.2 F | DIASTOLIC BLOOD PRESSURE: 71 MMHG | RESPIRATION RATE: 18 BRPM | WEIGHT: 110 LBS | HEART RATE: 75 BPM | OXYGEN SATURATION: 100 % | HEIGHT: 63 IN | BODY MASS INDEX: 19.49 KG/M2

## 2022-06-14 DIAGNOSIS — O30.001 TWIN GESTATION IN FIRST TRIMESTER, UNSPECIFIED MULTIPLE GESTATION TYPE: Primary | ICD-10-CM

## 2022-06-14 LAB
BILIRUBIN URINE: NEGATIVE
BLOOD, URINE: NEGATIVE
CHP ED QC CHECK: NORMAL
CLARITY: CLEAR
COLOR: YELLOW
GLUCOSE URINE: NEGATIVE MG/DL
GONADOTROPIN, CHORIONIC (HCG) QUANT: NORMAL MIU/ML
KETONES, URINE: NEGATIVE MG/DL
LEUKOCYTE ESTERASE, URINE: NEGATIVE
NITRITE, URINE: NEGATIVE
PH UA: 6.5 (ref 5–9)
PREGNANCY TEST URINE, POC: POSITIVE
PROTEIN UA: NEGATIVE MG/DL
SPECIFIC GRAVITY UA: 1 (ref 1–1.03)
URINE REFLEX TO CULTURE: NORMAL
UROBILINOGEN, URINE: 0.2 E.U./DL

## 2022-06-14 PROCEDURE — 81003 URINALYSIS AUTO W/O SCOPE: CPT

## 2022-06-14 PROCEDURE — 76801 OB US < 14 WKS SINGLE FETUS: CPT

## 2022-06-14 PROCEDURE — 99284 EMERGENCY DEPT VISIT MOD MDM: CPT

## 2022-06-14 PROCEDURE — 36415 COLL VENOUS BLD VENIPUNCTURE: CPT

## 2022-06-14 PROCEDURE — 76802 OB US < 14 WKS ADDL FETUS: CPT

## 2022-06-14 PROCEDURE — 76817 TRANSVAGINAL US OBSTETRIC: CPT

## 2022-06-14 PROCEDURE — 84702 CHORIONIC GONADOTROPIN TEST: CPT

## 2022-06-14 RX ORDER — SODIUM CHLORIDE 9 MG/ML
INJECTION, SOLUTION INTRAVENOUS
Status: DISCONTINUED
Start: 2022-06-14 | End: 2022-06-14 | Stop reason: HOSPADM

## 2022-06-14 ASSESSMENT — PAIN - FUNCTIONAL ASSESSMENT
PAIN_FUNCTIONAL_ASSESSMENT: NONE - DENIES PAIN
PAIN_FUNCTIONAL_ASSESSMENT: NONE - DENIES PAIN

## 2022-06-14 NOTE — TELEPHONE ENCOUNTER
Pt called back she is aware. States last night she started spotting, states mainly noticing when she wipes, no abdominal pain or cramping, I did advise her I would let her  know but she should go to the ER to be evaluated since she is pregnant and spotting since yesterday.

## 2022-06-14 NOTE — TELEPHONE ENCOUNTER
called pt unable to leave vm due to it not being set up. Pt needs to be aware the labs from 6/13 need to be redrawn due to lab error.

## 2022-06-14 NOTE — ED TRIAGE NOTES
Patient presents for UTI S/S, urinary frequency/burning with urination/discharge. States she started having vaginal spotting this afternoon and was concerned due to previous miscarriage.

## 2022-06-14 NOTE — ED PROVIDER NOTES
[address] [facility] ED  EMERGENCY DEPARTMENT ENCOUNTER      Pt Name: Alize Lorenz  MRN: [id number]  [place] 1995  Date of evaluation: 6/14/2022  Provider: Julio C Leon MD    [address]       Chief Complaint   Patient presents with    Vaginal Bleeding     Spotting started today approx 5wks preg         HISTORY OF PRESENT ILLNESS   (Location/Symptom, Timing/Onset, Context/Setting, Quality, Duration, Modifying Factors, Severity)  Note limiting factors. 78-year-old female presenting with vaginal spotting. Symptoms started this morning. She is about 5 weeks pregnant. Has not had an ultrasound. Denies any abdominal pain or abnormal discharge. She is Rh+. Notes minimal spotting. Called her OB office who directed her here. Nursing Notes were reviewed. REVIEW OF SYSTEMS    (2-9 systems for level 4, 10 or more for level 5)     Review of Systems   Genitourinary: Positive for vaginal bleeding. All other systems reviewed and are negative. Except as noted above the remainder of the review of systems was reviewed and negative.        PAST MEDICAL HISTORY     Past Medical History:   Diagnosis Date    Cough variant asthma 2/6/2014    Hodgkin disease ([facility] [address]     Hodgkin's    Kidney stone          SURGICAL HISTORY       Past Surgical History:   Procedure Laterality Date    BREAST FIBROADENOMA SURGERY  01/2013   [employer] DENTAL SURGERY  01/2022    all wisdom teeth removed     TYMPANOSTOMY TUBE PLACEMENT  1997    WISDOM TOOTH EXTRACTION  01/06/2022         CURRENT MEDICATIONS       Current Discharge Medication List      CONTINUE these medications which have NOT CHANGED    Details   Loratadine (CLARITIN PO) Take by mouth      Prenatal Vit-Fe Fumarate-FA (PRENATAL PO) Take by mouth             ALLERGIES     Penicillins    FAMILY HISTORY       Family History   Problem Relation Age of Onset    High Blood Pressure Mother     High Blood Pressure Father     High Blood Pressure Maternal Aunt     High Blood Pressure Maternal Grandmother     High Blood Pressure Maternal Grandfather     Diabetes Other         mom's side    Cancer Neg Hx     Breast Cancer Neg Hx     Prostate Cancer Neg Hx     Depression Neg Hx           SOCIAL HISTORY       Social History     Socioeconomic History    Marital status:      Spouse name: None    Number of children: None    Years of education: None    Highest education level: None   Occupational History    None   Tobacco Use    Smoking status: Never Smoker    Smokeless tobacco: Never Used   Vaping Use    Vaping Use: Never used   Substance and Sexual Activity    Alcohol use: Yes     Comment: rare    Drug use: No    Sexual activity: Yes     Partners: Male   Other Topics Concern    None   Social History Narrative    None     Social Determinants of Health     Financial Resource Strain:     Difficulty of Paying Living Expenses: Not on file   Food Insecurity: No Food Insecurity    Worried About Running Out of Food in the Last Year: Never true    Bridget of Food in the Last Year: Never true   Transportation Needs:     Lack of Transportation (Medical): Not on file    Lack of Transportation (Non-Medical):  Not on file   Physical Activity:     Days of Exercise per Week: Not on file    Minutes of Exercise per Session: Not on file   Stress:     Feeling of Stress : Not on file   Social Connections:     Frequency of Communication with Friends and Family: Not on file    Frequency of Social Gatherings with Friends and Family: Not on file    Attends Sikh Services: Not on file    Active Member of Clubs or Organizations: Not on file    Attends Club or Organization Meetings: Not on file    Marital Status: Not on file   Intimate Partner Violence:     Fear of Current or Ex-Partner: Not on file    Emotionally Abused: Not on file    Physically Abused: Not on file    Sexually Abused: Not on file   Housing Stability:     Unable to Pay for Housing in the Last Year: Not on file    Number of Places Lived in the Last Year: Not on file    Unstable Housing in the Last Year: Not on file       SCREENINGS    [place] Coma Scale  Eye Opening: Spontaneous  Best Verbal Response: Oriented  Best Motor Response: Obeys commands  [place] Coma Scale Score: 15          PHYSICAL EXAM    (up to 7 for level 4, 8 or more for level 5)     ED Triage Vitals [06/14/22 1723]   BP Temp Temp Source Heart Rate Resp SpO2 Height Weight   (!) 135/90 97.2 °F (36.2 °C) Temporal 82 16 100 % 5' 3\" (1.6 m) 110 lb (49.9 kg)       Physical Exam  Vitals and nursing note reviewed. Constitutional:       General: She is not in acute distress. Appearance: Normal appearance. She is well-developed. She is not ill-appearing. HENT:      Head: Normocephalic and atraumatic. Mouth/Throat:      Mouth: Mucous membranes are moist.      Pharynx: Oropharynx is clear. Eyes:      Extraocular Movements: Extraocular movements intact. Conjunctiva/sclera: Conjunctivae normal.   Cardiovascular:      Rate and Rhythm: Normal rate and regular rhythm. Pulmonary:      Effort: Pulmonary effort is normal.      Breath sounds: Normal breath sounds. Abdominal:      General: Bowel sounds are normal.      Palpations: Abdomen is soft. Tenderness: There is no abdominal tenderness. Musculoskeletal:         General: No deformity. Normal range of motion. Cervical back: Normal range of motion and neck supple. Skin:     General: Skin is warm and dry. Capillary Refill: Capillary refill takes less than 2 seconds. Neurological:      General: No focal deficit present. Mental Status: She is alert and oriented to person, place, and time. Mental status is at baseline. Cranial Nerves: No cranial nerve deficit. Psychiatric:         Thought Content:  Thought content normal.         DIAGNOSTIC RESULTS     EKG: All EKG's are interpreted by the Emergency Department Physician who either signs or Co-signs this chart in the absence of a cardiologist.    RADIOLOGY:   Non-plain film images such as CT, Ultrasound and MRI are read by the radiologist. Plain radiographic images are visualized and preliminarily interpreted by the emergency physician with the below findings:    Interpretation per the Radiologist below, if available at the time of this note:    US OB LESS THAN 14 [address] [place]   Final Result      LIVE TWIN INTRAUTERINE PREGNANCY, APPROXIMATELY      US OB TRANSVAGINAL   Final Result      LIVE [address] [facility], APPROXIMATELY      US OB LESS THAN 14 WEEKS ADDITIONAL FETUS   Final Result      LIVE [address] [facility], APPROXIMATELY          LABS:  Labs Reviewed   POCT URINE PREGNANCY - Normal   URINALYSIS WITH REFLEX TO CULTURE   HCG, QUANTITATIVE, PREGNANCY       All other labs were within normal range or not returned as of this dictation. EMERGENCY DEPARTMENT COURSE and DIFFERENTIAL DIAGNOSIS/MDM:   Vitals:    Vitals:    06/14/22 1723 06/14/22 1916   BP: (!) 135/90 116/80   Pulse: 82 78   Resp: 16 17   Temp: 97.2 °F (36.2 °C)    TempSrc: Temporal    SpO2: 100% 100%   Weight: 110 lb (49.9 kg)    Height: 5' 3\" (1.6 m)        MDM  Number of Diagnoses or Management Options  Twin gestation in first trimester, unspecified multiple gestation type  Diagnosis management comments: Ultrasound showing intrauterine twin pregnancy. Patient will be discharged home in good condition. Patient has been hemodynamically stable throughout ED course and is appropriate for outpatient follow up. Patient should follow up with OB in 2-3 days or return to ED immediately for any new or worsening symptoms. Patient is well appearing on discharge and agreeable with plan of care. Procedures    CRITICAL CARE TIME   Total Critical Care time was 0 minutes, excluding separately reportable procedures.   There was a high probability of clinically significant/life threatening deterioration in the patient's condition which required my urgent intervention. FINAL IMPRESSION      1.  Twin gestation in first trimester, unspecified multiple gestation type          DISPOSITION/PLAN   DISPOSITION Decision To Discharge 06/14/2022 07:54:10 PM      (Please note that portions of this note were completed with a voice recognition program.  Efforts were made to edit the dictations but occasionally words are mis-transcribed.)    Ani Malagon MD (electronically signed)  Attending Emergency Physician        Ani Malagon MD  06/14/22 2000

## 2022-06-15 ENCOUNTER — TELEPHONE (OUTPATIENT)
Dept: OBGYN CLINIC | Age: 27
End: 2022-06-15

## 2022-06-15 LAB — URINE CULTURE, ROUTINE: NORMAL

## 2022-06-15 NOTE — TELEPHONE ENCOUNTER
Patient called to advise her visit to the ED revealed she is having TWINS. Patient would like to know if she should be seen before her 7/13/22 appointment. Advised will call to schedule if Provider deems it neccessary.

## 2022-06-15 NOTE — TELEPHONE ENCOUNTER
Pt does not need earlier visit, pt to have repeat US in 4wks and pt will be referred to high risk for anatomy scan at 18-20wks

## 2022-06-16 LAB
SPECIMEN SOURCE: NORMAL
T. VAGINALIS AMPLIFIED: NEGATIVE

## 2022-06-17 LAB
6-ACETYLMORPHINE: NOT DETECTED
7-AMINOCLONAZEPAM: NOT DETECTED
ALPHA-OH-ALPRAZOLAM: NOT DETECTED
ALPHA-OH-MIDAZOLAM, URINE: NOT DETECTED
ALPRAZOLAM: NOT DETECTED
AMPHETAMINE: NOT DETECTED
BARBITURATES: NOT DETECTED
BENZOYLECGONINE: NOT DETECTED
BUPRENORPHINE: NOT DETECTED
CARISOPRODOL: NOT DETECTED
CLONAZEPAM: NOT DETECTED
CODEINE: NOT DETECTED
CREATININE URINE: 82.1 MG/DL (ref 20–400)
DIAZEPAM: NOT DETECTED
EER PAIN MGT DRUG PANEL, HIGH RES/EMIT U: NORMAL
ETHYL GLUCURONIDE: NOT DETECTED
FENTANYL: NOT DETECTED
GABAPENTIN: NOT DETECTED
HYDROCODONE: NOT DETECTED
HYDROMORPHONE: NOT DETECTED
LORAZEPAM: NOT DETECTED
MARIJUANA METABOLITE: NOT DETECTED
MDA: NOT DETECTED
MDEA: NOT DETECTED
MDMA URINE: NOT DETECTED
MEPERIDINE: NOT DETECTED
METHADONE: NOT DETECTED
METHAMPHETAMINE: NOT DETECTED
METHYLPHENIDATE: NOT DETECTED
MIDAZOLAM: NOT DETECTED
MORPHINE: NOT DETECTED
NALOXONE: NOT DETECTED
NORBUPRENORPHINE, FREE: NOT DETECTED
NORDIAZEPAM: NOT DETECTED
NORFENTANYL: NOT DETECTED
NORHYDROCODONE, URINE: NOT DETECTED
NOROXYCODONE: NOT DETECTED
NOROXYMORPHONE, URINE: NOT DETECTED
OXAZEPAM: NOT DETECTED
OXYCODONE: NOT DETECTED
OXYMORPHONE: NOT DETECTED
PAIN MANAGEMENT DRUG PANEL: NORMAL
PCP: NOT DETECTED
PHENTERMINE: NOT DETECTED
PREGABALIN: NOT DETECTED
TAPENTADOL, URINE: NOT DETECTED
TAPENTADOL-O-SULFATE, URINE: NOT DETECTED
TEMAZEPAM: NOT DETECTED
TRAMADOL: NOT DETECTED
ZOLPIDEM: NOT DETECTED

## 2022-06-18 LAB
C. TRACHOMATIS DNA ,URINE: NEGATIVE
N. GONORRHOEAE DNA, URINE: NEGATIVE

## 2022-06-23 ENCOUNTER — HOSPITAL ENCOUNTER (EMERGENCY)
Age: 27
Discharge: HOME | End: 2022-06-23
Payer: COMMERCIAL

## 2022-06-23 ENCOUNTER — APPOINTMENT (OUTPATIENT)
Dept: ULTRASOUND IMAGING | Age: 27
End: 2022-06-23
Payer: COMMERCIAL

## 2022-06-23 VITALS
DIASTOLIC BLOOD PRESSURE: 75 MMHG | HEART RATE: 88 BPM | SYSTOLIC BLOOD PRESSURE: 103 MMHG | HEIGHT: 63 IN | BODY MASS INDEX: 19.49 KG/M2 | RESPIRATION RATE: 18 BRPM | OXYGEN SATURATION: 100 % | TEMPERATURE: 98.9 F | WEIGHT: 110 LBS

## 2022-06-23 DIAGNOSIS — O46.8X1 SUBCHORIONIC HEMORRHAGE OF PLACENTA IN FIRST TRIMESTER, SINGLE OR UNSPECIFIED FETUS: ICD-10-CM

## 2022-06-23 DIAGNOSIS — O41.8X10 SUBCHORIONIC HEMORRHAGE OF PLACENTA IN FIRST TRIMESTER, SINGLE OR UNSPECIFIED FETUS: ICD-10-CM

## 2022-06-23 DIAGNOSIS — O46.90 VAGINAL BLEEDING IN PREGNANCY: Primary | ICD-10-CM

## 2022-06-23 LAB
ABO/RH: NORMAL
ALBUMIN SERPL-MCNC: 4.2 G/DL (ref 3.5–4.6)
ALP BLD-CCNC: 62 U/L (ref 40–130)
ALT SERPL-CCNC: 9 U/L (ref 0–33)
ANION GAP SERPL CALCULATED.3IONS-SCNC: 9 MEQ/L (ref 9–15)
APTT: 30.7 SEC (ref 24.4–36.8)
AST SERPL-CCNC: 15 U/L (ref 0–35)
BACTERIA: ABNORMAL /HPF
BASOPHILS ABSOLUTE: 0 K/UL (ref 0–0.2)
BASOPHILS RELATIVE PERCENT: 0.5 %
BILIRUB SERPL-MCNC: <0.2 MG/DL (ref 0.2–0.7)
BILIRUBIN URINE: NEGATIVE
BLOOD, URINE: ABNORMAL
BUN BLDV-MCNC: 24 MG/DL (ref 6–20)
CALCIUM SERPL-MCNC: 9.1 MG/DL (ref 8.5–9.9)
CHLORIDE BLD-SCNC: 101 MEQ/L (ref 95–107)
CLARITY: CLEAR
CO2: 26 MEQ/L (ref 20–31)
COLOR: YELLOW
CREAT SERPL-MCNC: 0.53 MG/DL (ref 0.5–0.9)
EOSINOPHILS ABSOLUTE: 0.1 K/UL (ref 0–0.7)
EOSINOPHILS RELATIVE PERCENT: 2.5 %
EPITHELIAL CELLS, UA: ABNORMAL /HPF (ref 0–5)
GFR AFRICAN AMERICAN: >60
GFR NON-AFRICAN AMERICAN: >60
GLOBULIN: 2.2 G/DL (ref 2.3–3.5)
GLUCOSE BLD-MCNC: 90 MG/DL (ref 70–99)
GLUCOSE URINE: NEGATIVE MG/DL
GONADOTROPIN, CHORIONIC (HCG) QUANT: NORMAL MIU/ML
HCT VFR BLD CALC: 35 % (ref 37–47)
HEMOGLOBIN: 12.2 G/DL (ref 12–16)
HYALINE CASTS: ABNORMAL /HPF (ref 0–5)
INR BLD: 1
KETONES, URINE: NEGATIVE MG/DL
LEUKOCYTE ESTERASE, URINE: ABNORMAL
LYMPHOCYTES ABSOLUTE: 2.2 K/UL (ref 1–4.8)
LYMPHOCYTES RELATIVE PERCENT: 43.6 %
MCH RBC QN AUTO: 32.1 PG (ref 27–31.3)
MCHC RBC AUTO-ENTMCNC: 34.8 % (ref 33–37)
MCV RBC AUTO: 92.1 FL (ref 82–100)
MONOCYTES ABSOLUTE: 0.4 K/UL (ref 0.2–0.8)
MONOCYTES RELATIVE PERCENT: 8.5 %
NEUTROPHILS ABSOLUTE: 2.2 K/UL (ref 1.4–6.5)
NEUTROPHILS RELATIVE PERCENT: 44.9 %
NITRITE, URINE: NEGATIVE
PDW BLD-RTO: 13.6 % (ref 11.5–14.5)
PH UA: 6 (ref 5–9)
PLATELET # BLD: 219 K/UL (ref 130–400)
POTASSIUM SERPL-SCNC: 3.6 MEQ/L (ref 3.4–4.9)
PROTEIN UA: NEGATIVE MG/DL
PROTHROMBIN TIME: 13.1 SEC (ref 12.3–14.9)
RBC # BLD: 3.8 M/UL (ref 4.2–5.4)
RBC UA: ABNORMAL /HPF (ref 0–5)
SODIUM BLD-SCNC: 136 MEQ/L (ref 135–144)
SPECIFIC GRAVITY UA: 1.03 (ref 1–1.03)
TOTAL PROTEIN: 6.4 G/DL (ref 6.3–8)
URINE REFLEX TO CULTURE: YES
UROBILINOGEN, URINE: 0.2 E.U./DL
WBC # BLD: 4.9 K/UL (ref 4.8–10.8)
WBC UA: ABNORMAL /HPF (ref 0–5)

## 2022-06-23 PROCEDURE — 76817 TRANSVAGINAL US OBSTETRIC: CPT

## 2022-06-23 PROCEDURE — 86900 BLOOD TYPING SEROLOGIC ABO: CPT

## 2022-06-23 PROCEDURE — 36415 COLL VENOUS BLD VENIPUNCTURE: CPT

## 2022-06-23 PROCEDURE — 81001 URINALYSIS AUTO W/SCOPE: CPT

## 2022-06-23 PROCEDURE — 96360 HYDRATION IV INFUSION INIT: CPT

## 2022-06-23 PROCEDURE — 87086 URINE CULTURE/COLONY COUNT: CPT

## 2022-06-23 PROCEDURE — 96361 HYDRATE IV INFUSION ADD-ON: CPT

## 2022-06-23 PROCEDURE — 85610 PROTHROMBIN TIME: CPT

## 2022-06-23 PROCEDURE — 76802 OB US < 14 WKS ADDL FETUS: CPT

## 2022-06-23 PROCEDURE — 84702 CHORIONIC GONADOTROPIN TEST: CPT

## 2022-06-23 PROCEDURE — 85730 THROMBOPLASTIN TIME PARTIAL: CPT

## 2022-06-23 PROCEDURE — 86901 BLOOD TYPING SEROLOGIC RH(D): CPT

## 2022-06-23 PROCEDURE — 76801 OB US < 14 WKS SINGLE FETUS: CPT

## 2022-06-23 PROCEDURE — 2580000003 HC RX 258: Performed by: STUDENT IN AN ORGANIZED HEALTH CARE EDUCATION/TRAINING PROGRAM

## 2022-06-23 PROCEDURE — 85025 COMPLETE CBC W/AUTO DIFF WBC: CPT

## 2022-06-23 PROCEDURE — 99284 EMERGENCY DEPT VISIT MOD MDM: CPT

## 2022-06-23 PROCEDURE — 80053 COMPREHEN METABOLIC PANEL: CPT

## 2022-06-23 RX ORDER — 0.9 % SODIUM CHLORIDE 0.9 %
1000 INTRAVENOUS SOLUTION INTRAVENOUS ONCE
Status: COMPLETED | OUTPATIENT
Start: 2022-06-23 | End: 2022-06-23

## 2022-06-23 RX ADMIN — SODIUM CHLORIDE 1000 ML: 9 INJECTION, SOLUTION INTRAVENOUS at 17:37

## 2022-06-23 ASSESSMENT — ENCOUNTER SYMPTOMS
ABDOMINAL DISTENTION: 0
ABDOMINAL PAIN: 0
PHOTOPHOBIA: 0
NAUSEA: 0
CONSTIPATION: 0
VOMITING: 0
DIARRHEA: 0
WHEEZING: 0
COUGH: 0
SHORTNESS OF BREATH: 0

## 2022-06-23 ASSESSMENT — PAIN - FUNCTIONAL ASSESSMENT: PAIN_FUNCTIONAL_ASSESSMENT: NONE - DENIES PAIN

## 2022-06-23 NOTE — ED PROVIDER NOTES
[address] [facility] ED  EMERGENCY DEPARTMENT ENCOUNTER      Pt Name: Joanne Palmer  MRN: [id number]  [place] 1995  Date of evaluation: 6/23/2022  Provider: Nima Bill, [address] [place] [address]       Chief Complaint   Patient presents with    Vaginal Bleeding     pt is approx 7 weeks pregnant, c/o vaginal bleeding and ABD cramping that started yesterday         HISTORY OF PRESENT ILLNESS   (Location/Symptom, Timing/Onset, Context/Setting, Quality, Duration, Modifying Factors, Severity)  Note limiting factors. Joanne Palmer is a 23 y.o. female who per chart reviews a past medical history of thrombocytopenia, Hodgkin's lymphoma in remission presents to the emergency department for evaluation of vaginal bleeding. Patient is approximately 7 weeks pregnant. Patient was seen in the emergency department on 6/14 for similar. She states she had a scant bright red bleeding at that time. Ultrasound was unremarkable, showing twin gestation. She states bleeding stopped however she had sexual intercourse with her partner on Tuesday and began bleeding again on Wednesday. Bleeding has been constant since. She states it is a scant and dark brown. She is only noticing when wiping. There is associated intermittent pelvic cramping. She has a history of 1 spontaneous miscarriage and 1 living child. She denies fever chills abnormal vaginal discharge abdominal pain nausea vomiting dizziness palpitations shortness of breath. HPI    Nursing Notes were reviewed. REVIEW OF SYSTEMS    (2-9 systems for level 4, 10 or more for level 5)     Review of Systems   Constitutional: Negative for chills and fever. HENT: Negative for congestion. Eyes: Negative for photophobia. Respiratory: Negative for cough, shortness of breath and wheezing. Cardiovascular: Negative for chest pain and palpitations.    Gastrointestinal: Negative for abdominal distention, abdominal pain, constipation, diarrhea, nausea and vomiting. Genitourinary: Positive for pelvic pain and vaginal bleeding. Negative for decreased urine volume, difficulty urinating, dysuria, flank pain, frequency, hematuria, vaginal discharge and vaginal pain. Musculoskeletal: Negative for myalgias. Allergic/Immunologic: Negative for immunocompromised state. Neurological: Negative for dizziness, weakness and headaches. All other systems reviewed and are negative. Except as noted above the remainder of the review of systems was reviewed and negative.        PAST MEDICAL HISTORY     Past Medical History:   Diagnosis Date    Cough variant asthma 2/6/2014    Hodgkin disease ([facility] [address]     Hodgkin's    Kidney stone          SURGICAL HISTORY       Past Surgical History:   Procedure Laterality Date    BREAST FIBROADENOMA SURGERY  01/2013   [facility]ENTAL SURGERY  01/2022    all wisdom teeth removed     TYMPANOSTOMY TUBE PLACEMENT  1997    WISDOM TOOTH EXTRACTION  01/06/2022         CURRENT MEDICATIONS       Discharge Medication List as of 6/23/2022  8:58 PM      CONTINUE these medications which have NOT CHANGED    Details   Loratadine (CLARITIN PO) Take by mouthHistorical Med      Prenatal Vit-Fe Fumarate-FA (PRENATAL PO) Take by mouthHistorical Med             ALLERGIES     Penicillins    FAMILY HISTORY       Family History   Problem Relation Age of Onset    High Blood Pressure Mother     High Blood Pressure Father     High Blood Pressure Maternal Aunt     High Blood Pressure Maternal Grandmother     High Blood Pressure Maternal Grandfather     Diabetes Other         mom's side    Cancer Neg Hx     Breast Cancer Neg Hx     Prostate Cancer Neg Hx     Depression Neg Hx           SOCIAL HISTORY       Social History     Socioeconomic History    Marital status:      Spouse name: None    Number of children: None    Years of education: None    Highest education level: None   Occupational History    None   Tobacco Use    Smoking status: Never Smoker    Smokeless tobacco: Never Used   Vaping Use    Vaping Use: Never used   Substance and Sexual Activity    Alcohol use: Yes     Comment: rare    Drug use: No    Sexual activity: Yes     Partners: Male   Other Topics Concern    None   Social History Narrative    None     Social Determinants of Health     Financial Resource Strain:     Difficulty of Paying Living Expenses: Not on file   Food Insecurity: No Food Insecurity    Worried About Running Out of Food in the Last Year: Never true    Bridget of Food in the Last Year: Never true   Transportation Needs:     Lack of Transportation (Medical): Not on file    Lack of Transportation (Non-Medical):  Not on file   Physical Activity:     Days of Exercise per Week: Not on file    Minutes of Exercise per Session: Not on file   Stress:     Feeling of Stress : Not on file   Social Connections:     Frequency of Communication with Friends and Family: Not on file    Frequency of Social Gatherings with Friends and Family: Not on file    Attends Oriental orthodox Services: Not on file    Active Member of [address] [place] [address] or Organizations: Not on file    Attends Club or Organization Meetings: Not on file    Marital Status: Not on file   Intimate Partner Violence:     Fear of Current or Ex-Partner: Not on file    Emotionally Abused: Not on file    Physically Abused: Not on file    Sexually Abused: Not on file   Housing Stability:     Unable to Pay for Housing in the Last Year: Not on file    Number of [place] in the Last Year: Not on file    Unstable Housing in the Last Year: Not on file       SCREENINGS         [place] Coma Scale  Eye Opening: Spontaneous  Best Verbal Response: Oriented  Best Motor Response: Obeys commands  [place] Coma Scale Score: 15                     CIWA Assessment  BP: 103/75  Heart Rate: 88                 PHYSICAL EXAM    (up to 7 for level 4, 8 or more for level 5)     ED Triage Vitals [06/23/22 1652]   BP Temp Temp Source Heart Rate Resp SpO2 Height Weight   112/79 98.9 °F (37.2 °C) Oral 87 16 98 % 5' 3\" (1.6 m) 110 lb (49.9 kg)       Physical Exam  Constitutional:       General: She is not in acute distress. Appearance: She is well-developed. She is not ill-appearing, toxic-appearing or diaphoretic. HENT:      Head: Normocephalic and atraumatic. Nose: Nose normal.      Mouth/Throat:      Mouth: Mucous membranes are moist.   Eyes:      Pupils: Pupils are equal, round, and reactive to light. Cardiovascular:      Rate and Rhythm: Normal rate and regular rhythm. Heart sounds: No murmur heard. No friction rub. No gallop. Pulmonary:      Effort: Pulmonary effort is normal.      Breath sounds: Normal breath sounds. No wheezing, rhonchi or rales. Abdominal:      General: Bowel sounds are normal. There is no distension. Palpations: Abdomen is soft. Tenderness: There is no abdominal tenderness. There is no guarding or rebound. Musculoskeletal:         General: No swelling. Cervical back: Normal range of motion. Skin:     General: Skin is warm and dry. Capillary Refill: Capillary refill takes less than 2 seconds. Neurological:      General: No focal deficit present. Mental Status: She is alert and oriented to person, place, and time. DIAGNOSTIC RESULTS     EKG: All EKG's are interpreted by the Emergency Department Physician who either signs or Co-signs this chart in the absence of a cardiologist.        RADIOLOGY:   Non-plain film images such as CT, Ultrasound and MRI are read by the radiologist. Plain radiographic images are visualized and preliminarily interpreted by the emergency physician with the below findings:      Interpretation per the Radiologist below, if available at the time of this note:    US OB LESS THAN 14 [address]   Final Result      Live twin intrauterine pregnancy. There is a subchorionic hemorrhage. NO FREE FLUID OR ABNORMAL ADNEXAL MASS.       [address] OB LESS THAN 14 WEEKS SINGLE OR FIRST GESTATION   Final Result      Live twin intrauterine pregnancy. There is a subchorionic hemorrhage. NO FREE FLUID OR ABNORMAL ADNEXAL MASS. US OB TRANSVAGINAL   Final Result      Live twin intrauterine pregnancy. There is a subchorionic hemorrhage. NO FREE FLUID OR ABNORMAL ADNEXAL MASS. ED BEDSIDE ULTRASOUND:   Performed by ED Physician - none    LABS:  Labs Reviewed   COMPREHENSIVE METABOLIC PANEL - Abnormal; Notable for the following components:       Result Value    BUN 24 (*)     Globulin 2.2 (*)     All other components within normal limits   CBC WITH AUTO DIFFERENTIAL - Abnormal; Notable for the following components:    RBC 3.80 (*)     Hematocrit 35.0 (*)     MCH 32.1 (*)     All other components within normal limits   URINALYSIS WITH REFLEX TO CULTURE - Abnormal; Notable for the following components:    Blood, Urine SMALL (*)     Leukocyte Esterase, Urine SMALL (*)     All other components within normal limits   MICROSCOPIC URINALYSIS - Abnormal; Notable for the following components:    Bacteria, UA RARE (*)     WBC, UA 10-20 (*)     RBC, UA 6-10 (*)     All other components within normal limits   CULTURE, URINE   APTT   PROTIME-INR   HCG, QUANTITATIVE, PREGNANCY   ABO/RH       All other labs were within normal range or not returned as of this dictation. EMERGENCY DEPARTMENT COURSE and DIFFERENTIAL DIAGNOSIS/MDM:   Vitals:    Vitals:    22 1652 22 1730 22   BP: 112/79 99/68 103/75   Pulse: 87 75 88   Resp:  18   Temp: 98.9 °F (37.2 °C)     TempSrc: Oral     SpO2: 98% 98% 100%   Weight: 110 lb (49.9 kg)     Height: 5' 3\" (1.6 m)       MDM  23year old  female history spontaneous  presents to ED 2-day history of scant dark brown/red vaginal bleeding, mostly noticed when she wipes, noticed after intercourse with her . Also having intermittent pelvic cramping.   Was seen in this ED about a week ago for similar vaginal bleeding but it had stopped. At that time she had an ultrasound and is approximately 7 weeks pregnant. Ultrasound did show a live twin gestation. Afebrile VSS. Soft nontender nondistended abdomen. Patient has a positive blood type. Beta hCG is 264 425. Ultrasound demonstrates a live twin gestation, fetal heart rate 147 for both fetuses. Also remarkable for subchorionic hemorrhage. No free fluid or abnormal adnexal mass. Patient is hemoglobin stable. No fevers leukocytosis or systemic infectious signs, mild leukocytes in the urine but no symptoms. Discussed patient's presentation, ultrasound, findings with OB/GYN on-call Dr. Olga Escudero who recommends that patient can be discharged home with instruction to remain well-hydrated, absolutely no intercourse until she is seen by her OB/GYN who is Dr. Vishnu Mathew and he is recommending that she sees Dr. Vishnu Mathew as soon as possible preferably tomorrow. Discussed all this with patient and partner in the room who will demonstrate understanding this plan. Discussed very strict return precautions and they understand. REASSESSMENT          CRITICAL CARE TIME   Total Critical Care time was 0 minutes, excluding separately reportable procedures. There was a high probability of clinically significant/life threatening deterioration in the patient's condition which required my urgent intervention. CONSULTS:  None    PROCEDURES:  Unless otherwise noted below, none     Procedures        FINAL IMPRESSION      1. Vaginal bleeding in pregnancy    2.  Subchorionic hemorrhage of placenta in first trimester, single or unspecified fetus          DISPOSITION/PLAN   DISPOSITION Decision To Discharge 06/23/2022 08:53:58 PM      PATIENT REFERRED TO:  Sheri Gray MD  [address]  [phone]    Schedule an appointment as soon as possible for a visit in 1 day        DISCHARGE MEDICATIONS:  Discharge Medication List as of 6/23/2022  8:58 PM        Controlled Substances Monitoring:     No flowsheet data found.     (Please note that portions of this note were completed with a voice recognition program.  Efforts were made to edit the dictations but occasionally words are mis-transcribed.)    JOSEE Aviles (electronically signed)             Amaya Aviles  06/23/22 [id number]

## 2022-06-23 NOTE — ED NOTES
Pt taken to [address] via cot by [address] .      Ava Wren RN  06/23/22 [address] Thrasher([place])

## 2022-06-23 NOTE — ED TRIAGE NOTES
Pt is approx 7 weeks pregnant, c/o vaginal bleeding and ABD cramping that started yesterday, Pt is A&OX3, calm, ambulatory, afebrile, breathes are equal and unlabored,

## 2022-06-24 ENCOUNTER — ROUTINE PRENATAL (OUTPATIENT)
Dept: OBGYN CLINIC | Age: 27
End: 2022-06-24
Payer: MEDICAID

## 2022-06-24 VITALS
BODY MASS INDEX: 19.49 KG/M2 | DIASTOLIC BLOOD PRESSURE: 72 MMHG | WEIGHT: 110 LBS | SYSTOLIC BLOOD PRESSURE: 98 MMHG | HEART RATE: 68 BPM

## 2022-06-24 DIAGNOSIS — O20.9 BLEEDING IN EARLY PREGNANCY: ICD-10-CM

## 2022-06-24 DIAGNOSIS — Z34.91 PRENATAL CARE IN FIRST TRIMESTER: Primary | ICD-10-CM

## 2022-06-24 DIAGNOSIS — Z3A.01 7 WEEKS GESTATION OF PREGNANCY: ICD-10-CM

## 2022-06-24 LAB — URINE CULTURE, ROUTINE: NORMAL

## 2022-06-24 PROCEDURE — G8427 DOCREV CUR MEDS BY ELIG CLIN: HCPCS | Performed by: ADVANCED PRACTICE MIDWIFE

## 2022-06-24 PROCEDURE — G8420 CALC BMI NORM PARAMETERS: HCPCS | Performed by: ADVANCED PRACTICE MIDWIFE

## 2022-06-24 PROCEDURE — 99213 OFFICE O/P EST LOW 20 MIN: CPT | Performed by: ADVANCED PRACTICE MIDWIFE

## 2022-06-24 PROCEDURE — 1036F TOBACCO NON-USER: CPT | Performed by: ADVANCED PRACTICE MIDWIFE

## 2022-06-24 NOTE — PROGRESS NOTES
SUBJECTIVE:  Denies bleeding, spotting, leaking of fluid, abnormal discharge. No fetal movement yet.  Presented to the ER 23 with new onset of vaginal bleeding, here for follow-up. Bleeding has resolved. Review of Systems   Eyes: Negative for visual disturbance. Respiratory: Negative for shortness of breath. Gastrointestinal: Negative for abdominal pain, constipation, diarrhea, nausea and vomiting. Genitourinary: Negative for dysuria, vaginal bleeding and vaginal discharge. Neurological: Negative for headaches. OBJECTIVE:  Physical Exam  Appearance:  Normal appearance  Cardiovascular:  Normal rate, Capillary refill less than 2 seconds  Pulmonary:  Normal effort, no distress  Abdominal:  No tenderness  MS:  No Swelling, No dependent edema  Skin:  Warm, dry  Neuro:  Alert and oriented x3, reflexes normal.  Psychiatric:  Normal mood and behavior    ASSESSMENT:  23 y.o. female  IUP at 7w0d    Patient Active Problem List    Diagnosis Date Noted    Single episode of elevated blood pressure     Thrombocytopenia affecting pregnancy ([facility] [address]     Hodgkin's lymphoma ([facility] [address] in remission 2014    Anemia 2013      Diagnosis Orders   1. Prenatal care in first trimester     2. 7 weeks gestation of pregnancy     3. Bleeding in early pregnancy         PLAN:  Discontinue bed rest  Keep next follow-up appointment already scheduled     Follow-Up  Return if symptoms worsen or fail to improve.     ADRIAN Villaseñor CNM

## 2022-06-27 ASSESSMENT — ENCOUNTER SYMPTOMS
DIARRHEA: 0
ABDOMINAL PAIN: 0
CONSTIPATION: 0
NAUSEA: 0
VOMITING: 0
SHORTNESS OF BREATH: 0

## 2022-07-06 ENCOUNTER — HOSPITAL ENCOUNTER (OUTPATIENT)
Dept: ULTRASOUND IMAGING | Age: 27
Discharge: HOME | End: 2022-07-08
Payer: COMMERCIAL

## 2022-07-06 ENCOUNTER — TELEPHONE (OUTPATIENT)
Dept: FAMILY MEDICINE CLINIC | Age: 27
End: 2022-07-06

## 2022-07-06 ENCOUNTER — HOSPITAL ENCOUNTER (EMERGENCY)
Age: 27
Discharge: HOME | End: 2022-07-06
Attending: EMERGENCY MEDICINE
Payer: COMMERCIAL

## 2022-07-06 VITALS
WEIGHT: 110 LBS | BODY MASS INDEX: 19.49 KG/M2 | DIASTOLIC BLOOD PRESSURE: 77 MMHG | HEART RATE: 92 BPM | SYSTOLIC BLOOD PRESSURE: 113 MMHG | HEIGHT: 63 IN | RESPIRATION RATE: 16 BRPM | OXYGEN SATURATION: 100 % | TEMPERATURE: 98.4 F

## 2022-07-06 DIAGNOSIS — Z34.90 EARLY STAGE OF PREGNANCY: ICD-10-CM

## 2022-07-06 DIAGNOSIS — N91.2 AMENORRHEA: ICD-10-CM

## 2022-07-06 DIAGNOSIS — K22.6 MALLORY-WEISS TEAR: Primary | ICD-10-CM

## 2022-07-06 DIAGNOSIS — K92.0 HEMATEMESIS WITH NAUSEA: ICD-10-CM

## 2022-07-06 DIAGNOSIS — Z3A.01 5 WEEKS GESTATION OF PREGNANCY: ICD-10-CM

## 2022-07-06 LAB
BASOPHILS ABSOLUTE: 0 K/UL (ref 0–0.2)
BASOPHILS RELATIVE PERCENT: 0.2 %
EOSINOPHILS ABSOLUTE: 0 K/UL (ref 0–0.7)
EOSINOPHILS RELATIVE PERCENT: 0.6 %
HCT VFR BLD CALC: 33.6 % (ref 37–47)
HEMOGLOBIN: 11.9 G/DL (ref 12–16)
LYMPHOCYTES ABSOLUTE: 1.5 K/UL (ref 1–4.8)
LYMPHOCYTES RELATIVE PERCENT: 24.1 %
MCH RBC QN AUTO: 32.4 PG (ref 27–31.3)
MCHC RBC AUTO-ENTMCNC: 35.4 % (ref 33–37)
MCV RBC AUTO: 91.4 FL (ref 82–100)
MONOCYTES ABSOLUTE: 0.4 K/UL (ref 0.2–0.8)
MONOCYTES RELATIVE PERCENT: 6.1 %
NEUTROPHILS ABSOLUTE: 4.2 K/UL (ref 1.4–6.5)
NEUTROPHILS RELATIVE PERCENT: 69 %
PDW BLD-RTO: 13.4 % (ref 11.5–14.5)
PLATELET # BLD: 213 K/UL (ref 130–400)
RBC # BLD: 3.68 M/UL (ref 4.2–5.4)
WBC # BLD: 6.1 K/UL (ref 4.8–10.8)

## 2022-07-06 PROCEDURE — 85025 COMPLETE CBC W/AUTO DIFF WBC: CPT

## 2022-07-06 PROCEDURE — 36415 COLL VENOUS BLD VENIPUNCTURE: CPT

## 2022-07-06 PROCEDURE — 6370000000 HC RX 637 (ALT 250 FOR IP): Performed by: EMERGENCY MEDICINE

## 2022-07-06 PROCEDURE — 99283 EMERGENCY DEPT VISIT LOW MDM: CPT

## 2022-07-06 PROCEDURE — 76801 OB US < 14 WKS SINGLE FETUS: CPT

## 2022-07-06 RX ORDER — MAGNESIUM HYDROXIDE/ALUMINUM HYDROXICE/SIMETHICONE 120; 1200; 1200 MG/30ML; MG/30ML; MG/30ML
30 SUSPENSION ORAL ONCE
Status: COMPLETED | OUTPATIENT
Start: 2022-07-06 | End: 2022-07-06

## 2022-07-06 RX ADMIN — MAGNESIUM HYDROXIDE/ALUMINUM HYDROXICE/SIMETHICONE 30 ML: 120; 1200; 1200 SUSPENSION ORAL at 10:54

## 2022-07-06 ASSESSMENT — ENCOUNTER SYMPTOMS
ABDOMINAL PAIN: 0
SHORTNESS OF BREATH: 0
VOMITING: 1
WHEEZING: 0
PHOTOPHOBIA: 0
CHEST TIGHTNESS: 0
BLOOD IN STOOL: 0
ABDOMINAL DISTENTION: 0
COUGH: 0
SORE THROAT: 0
EYE DISCHARGE: 0
DIARRHEA: 0

## 2022-07-06 ASSESSMENT — PAIN - FUNCTIONAL ASSESSMENT: PAIN_FUNCTIONAL_ASSESSMENT: NONE - DENIES PAIN

## 2022-07-06 NOTE — ED NOTES
Discharge instructions reviewed with patient. Patient denies any further questions at this time. Pt encouraged to make follow up appointments with PCP and any speciality referrals.         Rajni Fernandez RN  07/06/22 4192

## 2022-07-06 NOTE — ED TRIAGE NOTES
Pt is 9 weeks pregnant and scheduled to have an ultra sound this morning so she was drinking 32 oz of water for preparation. Pt states she got about 16 oz down when she vomited and there was blood. Pt called her doctor and was told to go to the ER. Pt denies any pain at this time.

## 2022-07-06 NOTE — ED PROVIDER NOTES
[address] [facility] ED  eMERGENCY dEPARTMENT eNCOUnter      Pt Name: Delma Ruvalcaba  MRN: [id number]  [place] 1995  Date of evaluation: 7/6/2022  Provider: Heike Hahn MD    CHIEF COMPLAINT       Chief Complaint   Patient presents with    Hemoptysis         HISTORY OF PRESENT ILLNESS   (Location/Symptom, Timing/Onset,Context/Setting, Quality, Duration, Modifying Factors, Severity)  Note limiting factors. Delma Ruvalcaba is a 23 y.o. female who presents to the emergency department for evaluation of vomiting blood. Patient is 9 weeks pregnant with a twin gestation. She is a G3, P1 Ab1. No complications this pregnancy besides higher risk because its a twin gestation. Scheduled for outpatient ultrasound before she sees the OB next week and after drinking a lot of fluids for the ultrasound she vomited once and it was blood-tinged. Told to come in for evaluation. She has no pain. She denies nausea vomiting. She denies vaginal discharge. Review of her ultrasound done prior to her coming in shows that she has a subchorionic hemorrhage similar to last week's ultrasound. No other complaints. HPI    NursingNotes were reviewed. REVIEW OF SYSTEMS    (2-9 systems for level 4, 10 or more for level 5)     Review of Systems   Constitutional: Negative for chills and diaphoresis. HENT: Negative for congestion, ear pain, mouth sores and sore throat. Eyes: Negative for photophobia and discharge. Respiratory: Negative for cough, chest tightness, shortness of breath and wheezing. Cardiovascular: Negative for chest pain and palpitations. Gastrointestinal: Positive for vomiting. Negative for abdominal distention, abdominal pain, blood in stool and diarrhea. Endocrine: Negative for cold intolerance. Genitourinary: Negative for difficulty urinating. Musculoskeletal: Negative for arthralgias. Skin: Negative for pallor and rash.    Allergic/Immunologic: Negative for immunocompromised state.   Neurological: Negative for dizziness and syncope. Hematological: Negative for adenopathy. Psychiatric/Behavioral: Negative for agitation and hallucinations. All other systems reviewed and are negative. Except as noted above the remainder of the review of systems was reviewed and negative.        PAST MEDICAL HISTORY     Past Medical History:   Diagnosis Date    Cough variant asthma 2/6/2014    Hodgkin disease ([facility] [address]     Hodgkin's    Kidney stone          SURGICALHISTORY       Past Surgical History:   Procedure Laterality Date    BREAST FIBROADENOMA SURGERY  01/2013   [facility] DENTAL SURGERY  01/2022    all wisdom teeth removed     TYMPANOSTOMY TUBE PLACEMENT  1997    WISDOM TOOTH EXTRACTION  01/06/2022         CURRENT MEDICATIONS       Previous Medications    LORATADINE (CLARITIN PO)    Take by mouth    PRENATAL VIT-FE FUMARATE-FA (PRENATAL PO)    Take by mouth       ALLERGIES     Penicillins    FAMILY HISTORY       Family History   Problem Relation Age of Onset    High Blood Pressure Mother     High Blood Pressure Father     High Blood Pressure Maternal Aunt     High Blood Pressure Maternal Grandmother     High Blood Pressure Maternal Grandfather     Diabetes Other         mom's side    Cancer Neg Hx     Breast Cancer Neg Hx     Prostate Cancer Neg Hx     Depression Neg Hx           SOCIAL HISTORY       Social History     Socioeconomic History    Marital status:      Spouse name: None    Number of children: None    Years of education: None    Highest education level: None   Occupational History    None   Tobacco Use    Smoking status: Never Smoker    Smokeless tobacco: Never Used   Vaping Use    Vaping Use: Never used   Substance and Sexual Activity    Alcohol use: Yes     Comment: rare    Drug use: No    Sexual activity: Yes     Partners: Male   Other Topics Concern    None   Social History Narrative    None     Social Determinants of Health     Financial Resource Strain:     Difficulty of Paying Living Expenses: Not on file   Food Insecurity: No Food Insecurity    Worried About Running Out of Food in the Last Year: Never true    Ran Out of Food in the Last Year: Never true   Transportation Needs:     Lack of Transportation (Medical): Not on file    Lack of Transportation (Non-Medical): Not on file   Physical Activity:     Days of Exercise per Week: Not on file    Minutes of Exercise per Session: Not on file   Stress:     Feeling of Stress : Not on file   Social Connections:     Frequency of Communication with Friends and Family: Not on file    Frequency of Social Gatherings with Friends and Family: Not on file    Attends Religion Services: Not on file    Active Member of [employer] or Organizations: Not on file    Attends Club or Organization Meetings: Not on file    Marital Status: Not on file   Intimate Partner Violence:     Fear of Current or Ex-Partner: Not on file    Emotionally Abused: Not on file    Physically Abused: Not on file    Sexually Abused: Not on file   Housing Stability:     Unable to Pay for Housing in the Last Year: Not on file    Number of [place] in the Last Year: Not on file    Unstable Housing in the Last Year: Not on file       SCREENINGS    Scarlett Coma Scale  Eye Opening: Spontaneous  Best Verbal Response: Oriented  Best Motor Response: Obeys commands  Crosby Coma Scale Score: 15 @FLOW([id number])@      PHYSICAL EXAM    (up to 7 for level 4, 8 or more for level 5)     ED Triage Vitals [07/06/22 1002]   BP Temp Temp Source Heart Rate Resp SpO2 Height Weight   113/77 98.4 °F (36.9 °C) Oral 92 16 100 % 5' 3\" (1.6 m) 110 lb (49.9 kg)       Physical Exam  Vitals and nursing note reviewed. Constitutional:       Appearance: She is well-developed. HENT:      Head: Normocephalic.       Nose: Nose normal.      Mouth/Throat:      Mouth: Mucous membranes are moist.   Eyes:      Conjunctiva/sclera: Conjunctivae normal.      Pupils: Pupils are equal, round, and reactive to light. Cardiovascular:      Rate and Rhythm: Normal rate and regular rhythm. Heart sounds: Normal heart sounds. Pulmonary:      Effort: Pulmonary effort is normal.      Breath sounds: Normal breath sounds. Abdominal:      General: Bowel sounds are normal.      Palpations: Abdomen is soft. Tenderness: There is no abdominal tenderness. There is no guarding. Musculoskeletal:         General: Normal range of motion. Cervical back: Normal range of motion and neck supple. Skin:     General: Skin is warm and dry. Capillary Refill: Capillary refill takes less than 2 seconds. Neurological:      General: No focal deficit present. Mental Status: She is alert and oriented to person, place, and time. Psychiatric:         Mood and Affect: Mood normal.         DIAGNOSTIC RESULTS     EKG: All EKG's are interpreted by the Emergency Department Physician who either signs or Co-signsthis chart in the absence of a cardiologist.      RADIOLOGY:   Anitha Chiles such as CT, Ultrasound and MRI are read by the radiologist. Plain radiographic images are visualized and preliminarily interpreted by the emergency physician with the below findings:      Interpretation per the Radiologist below, if available at the time ofthis note:    No orders to display         ED BEDSIDE ULTRASOUND:   Performed by ED Physician - none    LABS:  Labs Reviewed   CBC WITH AUTO DIFFERENTIAL - Abnormal; Notable for the following components:       Result Value    RBC 3.68 (*)     Hemoglobin 11.9 (*)     Hematocrit 33.6 (*)     MCH 32.4 (*)     All other components within normal limits       All other labs were within normal range or not returned as of this dictation.     EMERGENCY DEPARTMENT COURSE and DIFFERENTIAL DIAGNOSIS/MDM:   Vitals:    Vitals:    07/06/22 1002   BP: 113/77   Pulse: 92   Resp: 16   Temp: 98.4 °F (36.9 °C)   TempSrc: Oral   SpO2: 100%   Weight: 110 lb (49.9 kg) Height: 5' 3\" (1.6 m)        MDM patient had normal stay here. No pain. Normal vital signs. Review of ultrasound is unchanged. She showed me a picture of her emesis and it was slightly blood-tinged but not grossly bloody. At this point I believe it was probably from the force of vomiting as she said it was very aggressive and likely a slight Maria De Jesus-Holcomb tear. Recommended Maalox and follow-up with physician next week as planned. CONSULTS:  None    PROCEDURES:  Unless otherwise noted below, none     Procedures    FINAL IMPRESSION      1. Maria De Jesus-Holcomb tear    2.  Hematemesis with nausea          DISPOSITION/PLAN   DISPOSITION Decision To Discharge 07/06/2022 11:31:33 AM      PATIENT REFERRED TO:  ADRIAN Willson - CNP  [address] [address], [address]  [place] [address] [phone]    In 1 week      OB/GYN            DISCHARGE MEDICATIONS:  New Prescriptions    No medications on file          (Please note that portions of this note were completed with a voice recognition program.  Efforts were made to edit the dictations but occasionally words are mis-transcribed.)    Dae Vasquez MD (electronically signed)  Attending Emergency Physician          Dae Vasquez MD  07/06/22 9488

## 2022-07-13 ENCOUNTER — ROUTINE PRENATAL (OUTPATIENT)
Dept: OBGYN CLINIC | Age: 27
End: 2022-07-13
Payer: MEDICAID

## 2022-07-13 VITALS
BODY MASS INDEX: 18.95 KG/M2 | WEIGHT: 107 LBS | HEART RATE: 99 BPM | DIASTOLIC BLOOD PRESSURE: 78 MMHG | SYSTOLIC BLOOD PRESSURE: 102 MMHG

## 2022-07-13 DIAGNOSIS — Z34.01 ENCOUNTER FOR SUPERVISION OF NORMAL FIRST PREGNANCY IN FIRST TRIMESTER: ICD-10-CM

## 2022-07-13 DIAGNOSIS — O30.041 DICHORIONIC DIAMNIOTIC TWIN PREGNANCY IN FIRST TRIMESTER: ICD-10-CM

## 2022-07-13 DIAGNOSIS — Z34.01 ENCOUNTER FOR SUPERVISION OF NORMAL FIRST PREGNANCY IN FIRST TRIMESTER: Primary | ICD-10-CM

## 2022-07-13 DIAGNOSIS — Z3A.09 9 WEEKS GESTATION OF PREGNANCY: ICD-10-CM

## 2022-07-13 LAB
HEPATITIS B SURFACE ANTIGEN INTERPRETATION: NORMAL
RUBELLA ANTIBODY IGG: 28.6 IU/ML

## 2022-07-13 PROCEDURE — 99213 OFFICE O/P EST LOW 20 MIN: CPT | Performed by: OBSTETRICS & GYNECOLOGY

## 2022-07-13 PROCEDURE — G8420 CALC BMI NORM PARAMETERS: HCPCS | Performed by: OBSTETRICS & GYNECOLOGY

## 2022-07-13 PROCEDURE — 1036F TOBACCO NON-USER: CPT | Performed by: OBSTETRICS & GYNECOLOGY

## 2022-07-13 PROCEDURE — G8427 DOCREV CUR MEDS BY ELIG CLIN: HCPCS | Performed by: OBSTETRICS & GYNECOLOGY

## 2022-07-13 NOTE — PROGRESS NOTES
Patient's last menstrual period was 05/06/2022.   Please reference prenatal and OB flow chart for further information  PT here today for routine prenatal care  Pt endorses fetal movement and denies loss of fluid, contractions or vaginal bleeding  Pt without complaints  ROS:  Pt denies headache, dysuria, nausea/vomiting  PE:  /78   Pulse 99   Wt 107 lb (48.5 kg)   LMP 05/06/2022   BMI 18.95 kg/m²   Gen - Alert and oriented x 3  HEENT- NC/AT, CVS - RRR, Lungs - CTAB  Abd - FH below umb  Appropriate fetal growth  Pt with twin gestation and referral to [facility] pending  PT to have PN labs at next apt  LPS 6/20 NILM HPV neg  GC/chlam neg  PT may be interested in having PN labs drawn prior to next visit

## 2022-07-14 LAB
HEPATITIS C ANTIBODY: NONREACTIVE
HIV AG/AB: NONREACTIVE
RPR: NORMAL

## 2022-07-16 LAB — VZV IGG SER QL IA: 76.7 IV

## 2022-07-19 ENCOUNTER — TELEPHONE (OUTPATIENT)
Dept: OBGYN CLINIC | Age: 27
End: 2022-07-19

## 2022-07-19 DIAGNOSIS — Z34.01 ENCOUNTER FOR SUPERVISION OF NORMAL FIRST PREGNANCY IN FIRST TRIMESTER: Primary | ICD-10-CM

## 2022-07-24 ENCOUNTER — OFFICE VISIT (OUTPATIENT)
Dept: FAMILY MEDICINE CLINIC | Age: 27
End: 2022-07-24
Payer: COMMERCIAL

## 2022-07-24 VITALS
SYSTOLIC BLOOD PRESSURE: 104 MMHG | BODY MASS INDEX: 18.96 KG/M2 | HEIGHT: 63 IN | OXYGEN SATURATION: 98 % | WEIGHT: 107 LBS | HEART RATE: 114 BPM | TEMPERATURE: 97.3 F | DIASTOLIC BLOOD PRESSURE: 72 MMHG

## 2022-07-24 DIAGNOSIS — R35.0 FREQUENCY OF URINATION: Primary | ICD-10-CM

## 2022-07-24 DIAGNOSIS — N89.8 VAGINAL DISCHARGE: ICD-10-CM

## 2022-07-24 DIAGNOSIS — R35.0 FREQUENCY OF URINATION: ICD-10-CM

## 2022-07-24 LAB
BILIRUBIN, POC: NORMAL
BLOOD URINE, POC: NORMAL
CLARITY, POC: NORMAL
COLOR, POC: YELLOW
GLUCOSE URINE, POC: NORMAL
KETONES, POC: NORMAL
LEUKOCYTE EST, POC: NORMAL
NITRITE, POC: NORMAL
PH, POC: 6
PROTEIN, POC: NORMAL
SPECIFIC GRAVITY, POC: 1.02
UROBILINOGEN, POC: NORMAL

## 2022-07-24 PROCEDURE — 81003 URINALYSIS AUTO W/O SCOPE: CPT | Performed by: NURSE PRACTITIONER

## 2022-07-24 PROCEDURE — 99213 OFFICE O/P EST LOW 20 MIN: CPT | Performed by: NURSE PRACTITIONER

## 2022-07-24 ASSESSMENT — ENCOUNTER SYMPTOMS
DIARRHEA: 0
NAUSEA: 0
VOMITING: 0
ABDOMINAL PAIN: 0
BACK PAIN: 1

## 2022-07-24 ASSESSMENT — SOCIAL DETERMINANTS OF HEALTH (SDOH): HOW HARD IS IT FOR YOU TO PAY FOR THE VERY BASICS LIKE FOOD, HOUSING, MEDICAL CARE, AND HEATING?: NOT HARD AT ALL

## 2022-07-24 NOTE — PROGRESS NOTES
Subjective  Nahid Lundberg, 23 y.o. female presents today with:  Chief Complaint   Patient presents with    Urinary Tract Infection     State frequency, yellow discharge, burn, start 5days, currently pregnant        HPI  Patient is 11 wks pregnant with twins- she has a subchorionic hemorrhage- smaller in size now and no bleeding not on pelvic rest   Hx of UTI and yeast infections and kidney infections and 5 kidney stones- never hospitalized      Started  5 days ago with urinary frequency, urgency- little urine then due to frequency,does have pain after urination- no gross hematuria   No abdominal pain or intense cramping no     + yellow vaginal discharge- ongoing about a week   Does have a decent amount of discharge-no itching/burning     Denies pain with intercourse     for about 5 years denies concern for STI      Review of Systems   Constitutional:  Negative for appetite change, chills and fever. Gastrointestinal:  Negative for abdominal pain, diarrhea, nausea and vomiting. Genitourinary:  Positive for dysuria, frequency and urgency. Negative for difficulty urinating, flank pain, hematuria, pelvic pain, vaginal bleeding, vaginal discharge (yellow) and vaginal pain. Musculoskeletal:  Positive for back pain (since being pregnant). Skin:  Negative for rash.      Past Medical History:   Diagnosis Date    Cough variant asthma 2/6/2014    Hodgkin disease ([facility] [address]     Hodgkin's    Kidney stone      Past Surgical History:   Procedure Laterality Date    BREAST FIBROADENOMA SURGERY  01/2013    DENTAL SURGERY  01/2022    all wisdom teeth removed     TYMPANOSTOMY TUBE PLACEMENT  1997    WISDOM TOOTH EXTRACTION  01/06/2022     Social History     Socioeconomic History    Marital status:      Spouse name: Not on file    Number of children: Not on file    Years of education: Not on file    Highest education level: Not on file   Occupational History    Not on file   Tobacco Use    Smoking status: Never Smokeless tobacco: Never   Vaping Use    Vaping Use: Never used   Substance and Sexual Activity    Alcohol use: Yes     Comment: rare    Drug use: No    Sexual activity: Yes     Partners: Male   Other Topics Concern    Not on file   Social History Narrative    Not on file     Social Determinants of Health     Financial Resource Strain: Low Risk     Difficulty of Paying Living Expenses: Not hard at all   Food Insecurity: No Food Insecurity    Worried About Running Out of Food in the Last Year: Never true    Ran Out of Food in the Last Year: Never true   Transportation Needs: Not on file   Physical Activity: Not on file   Stress: Not on file   Social Connections: Not on file   Intimate Partner Violence: Not on file   Housing Stability: Not on file     Family History   Problem Relation Age of Onset    High Blood Pressure Mother     High Blood Pressure Father     High Blood Pressure Maternal Aunt     High Blood Pressure Maternal Grandmother     High Blood Pressure Maternal Grandfather     Diabetes Other         mom's side    Cancer Neg Hx     Breast Cancer Neg Hx     Prostate Cancer Neg Hx     Depression Neg Hx      Allergies   Allergen Reactions    Penicillins Hives     Current Outpatient Medications   Medication Sig Dispense Refill    Loratadine (CLARITIN PO) Take by mouth      Prenatal Vit-Fe Fumarate-FA (PRENATAL PO) Take by mouth       No current facility-administered medications for this visit. PMH, Surgical Hx, Family Hx, and Social Hx reviewed and updated. Health Maintenance reviewed.     Objective  Vitals:    07/24/22 1052   BP: 104/72   Pulse: (!) 114   Temp: 97.3 °F (36.3 °C)   SpO2: 98%   Weight: 107 lb (48.5 kg)   Height: 5' 3\" (1.6 m)     BP Readings from Last 3 Encounters:   07/24/22 104/72   07/13/22 102/78   07/06/22 113/77     Wt Readings from Last 3 Encounters:   07/24/22 107 lb (48.5 kg)   07/13/22 107 lb (48.5 kg)   07/06/22 110 lb (49.9 kg)     Physical Exam  Constitutional:       General: She is not in acute distress. Appearance: Normal appearance. HENT:      Head: Normocephalic. Eyes:      Extraocular Movements: Extraocular movements intact. Pupils: Pupils are equal, round, and reactive to light. Cardiovascular:      Rate and Rhythm: Normal rate and regular rhythm. Pulses: Normal pulses. Heart sounds: Normal heart sounds. Pulmonary:      Effort: Pulmonary effort is normal. No respiratory distress. Breath sounds: Normal breath sounds. No wheezing. Abdominal:      General: Bowel sounds are normal. There is no distension. Palpations: Abdomen is soft. Tenderness: There is no abdominal tenderness. There is no right CVA tenderness or left CVA tenderness. Genitourinary:     Pubic Area: No rash. Labia:         Right: No rash or lesion. Left: No rash or lesion. Vagina: Vaginal discharge (thin yellow) present. Musculoskeletal:         General: Normal range of motion. Cervical back: Normal range of motion and neck supple. Right lower leg: No edema. Left lower leg: No edema. Skin:     General: Skin is warm and dry. Neurological:      Mental Status: She is alert and oriented to person, place, and time. Mental status is at baseline. Psychiatric:         Mood and Affect: Mood normal.         Behavior: Behavior normal.     Assessment & Plan    Diagnosis Orders   1. Frequency of urination  POCT Urinalysis No Micro (Auto)    Culture, Urine    C.trachomatis N.gonorrhoeae DNA    Wet Prep, Genital      2.  Vaginal discharge  C.trachomatis N.gonorrhoeae DNA    Wet Prep, Genital      Urine dip negative, will send for culture   Increase in vaginal Discharge could be related pregnancy,-wet prep and GCT sent  Advised to call OB in the morning regarding symptoms - ER for any worsening symptoms- or cramping/bleeding   Orders Placed This Encounter   Procedures    Culture, Urine     Standing Status:   Future     Standing Expiration Date: 7/24/2023     Order Specific Question:   Specify (ex-cath, midstream, cysto, etc)? Answer:   midstream    C.trachomatis N.gonorrhoeae DNA     Standing Status:   Future     Standing Expiration Date:   7/24/2023    Wet Prep, Genital     Standing Status:   Future     Standing Expiration Date:   7/24/2023    POCT Urinalysis No Micro (Auto)     No orders of the defined types were placed in this encounter. There are no discontinued medications. Return in about 2 days (around 7/26/2022) for follow up with OB. Reviewed with the patient: current clinical status,medications, activities and diet. Side effects, adverse effects of the medication prescribed today, as well as treatment plan/ rationale and result expectations have been discussed with the patient who expresses understanding and desires to proceed. Close follow up to evaluate treatment results and for coordination of care. I have reviewed the patient's medical history in detail and updated the computerized patient record.     Lori Recio, APRN - CNP

## 2022-07-25 ENCOUNTER — TELEPHONE (OUTPATIENT)
Dept: OBGYN CLINIC | Age: 27
End: 2022-07-25

## 2022-07-25 LAB
CLUE CELLS: NORMAL
TRICHOMONAS PREP: NORMAL
TRICHOMONAS VAGINALIS SCREEN: NEGATIVE
URINE CULTURE, ROUTINE: NORMAL
YEAST WET PREP: NORMAL

## 2022-07-25 NOTE — TELEPHONE ENCOUNTER
Received call from patient who stated was seen at the walk in clinic and was told by provider according to patient that she did not need to be on no medicationdid due to the doctor did not see anything. According to pt. Was referred to contact her OBGYN provider to see if her if provider would like to prescribe her a medication.

## 2022-07-25 NOTE — TELEPHONE ENCOUNTER
Contacted the patient,informed some of her lab results are still pending,may view results via my chart,if experiencing any issues may contact the office to schedule an appointment.

## 2022-07-27 ENCOUNTER — TELEPHONE (OUTPATIENT)
Dept: FAMILY MEDICINE CLINIC | Age: 27
End: 2022-07-27

## 2022-07-29 ENCOUNTER — OFFICE VISIT (OUTPATIENT)
Dept: FAMILY MEDICINE CLINIC | Age: 27
End: 2022-07-29
Payer: COMMERCIAL

## 2022-07-29 ENCOUNTER — HOSPITAL ENCOUNTER (OUTPATIENT)
Dept: ULTRASOUND IMAGING | Age: 27
Discharge: HOME | End: 2022-07-31
Payer: COMMERCIAL

## 2022-07-29 VITALS
WEIGHT: 107 LBS | TEMPERATURE: 97 F | HEART RATE: 96 BPM | RESPIRATION RATE: 16 BRPM | DIASTOLIC BLOOD PRESSURE: 72 MMHG | SYSTOLIC BLOOD PRESSURE: 104 MMHG | OXYGEN SATURATION: 99 % | BODY MASS INDEX: 18.96 KG/M2 | HEIGHT: 63 IN

## 2022-07-29 DIAGNOSIS — R10.11 RUQ PAIN: ICD-10-CM

## 2022-07-29 DIAGNOSIS — R05.9 COUGH: Primary | ICD-10-CM

## 2022-07-29 PROCEDURE — G8420 CALC BMI NORM PARAMETERS: HCPCS | Performed by: PHYSICIAN ASSISTANT

## 2022-07-29 PROCEDURE — 99213 OFFICE O/P EST LOW 20 MIN: CPT | Performed by: PHYSICIAN ASSISTANT

## 2022-07-29 PROCEDURE — 87804 INFLUENZA ASSAY W/OPTIC: CPT | Performed by: PHYSICIAN ASSISTANT

## 2022-07-29 PROCEDURE — 87426 SARSCOV CORONAVIRUS AG IA: CPT | Performed by: PHYSICIAN ASSISTANT

## 2022-07-29 PROCEDURE — G8427 DOCREV CUR MEDS BY ELIG CLIN: HCPCS | Performed by: PHYSICIAN ASSISTANT

## 2022-07-29 PROCEDURE — 76705 ECHO EXAM OF ABDOMEN: CPT

## 2022-07-29 PROCEDURE — 1036F TOBACCO NON-USER: CPT | Performed by: PHYSICIAN ASSISTANT

## 2022-07-29 ASSESSMENT — ENCOUNTER SYMPTOMS
SORE THROAT: 0
NAUSEA: 0
WHEEZING: 0
SINUS PAIN: 0
BACK PAIN: 0
RHINORRHEA: 0
SHORTNESS OF BREATH: 0
COUGH: 1
DIARRHEA: 0
ABDOMINAL PAIN: 0
CHEST TIGHTNESS: 0
VOMITING: 0
HEARTBURN: 0
HEMOPTYSIS: 0
SINUS PRESSURE: 0

## 2022-07-29 ASSESSMENT — VISUAL ACUITY: OU: 1

## 2022-07-29 NOTE — PROGRESS NOTES
[address]  CHIEF COMPLAINT       Chief Complaint   Patient presents with    Cough     Cough, phlegm production, sharp pain in stomach onset Tuesday          HISTORY OF PRESENT ILLNESS   Moises Gibbs is a 23 y.o. female who presents with:  Cough  This is a new problem. The current episode started yesterday. The problem has been unchanged. The cough is Non-productive. Associated symptoms include chills, nasal congestion and postnasal drip. Pertinent negatives include no chest pain, ear congestion, ear pain, fever, headaches, heartburn, hemoptysis, myalgias, rash, rhinorrhea, sore throat, shortness of breath, sweats, weight loss or wheezing. Nothing aggravates the symptoms. She has tried nothing for the symptoms. Her past medical history is significant for asthma. There is no history of bronchiectasis, bronchitis, COPD, emphysema, environmental allergies or pneumonia. Abdominal pain on the right upper quadrant. Patient states symptoms started today while a work. Pain was sharp 10/10 that radiated upwards to the right shoulder. Patient's pain has improved at this time, but still aching in nature. Has not taken anything today for pain. Patient does endorse nausea, but not vomiting. Patient is also 12 weeks pregnant. Patient Last ate at 1 hour ago. No fevers, chills, vomiting, or previous abdominal surgery. Patient is eating and drinking. REVIEW OF SYSTEMS     Review of Systems   Constitutional:  Positive for chills. Negative for activity change, appetite change, fever and weight loss. HENT:  Positive for postnasal drip. Negative for congestion, drooling, ear pain, rhinorrhea, sinus pressure, sinus pain and sore throat. Eyes:  Negative for visual disturbance. Respiratory:  Positive for cough. Negative for hemoptysis, chest tightness, shortness of breath and wheezing. Cardiovascular:  Negative for chest pain.    Gastrointestinal:  Negative for abdominal pain, diarrhea, heartburn, nausea and vomiting. Endocrine: Negative for cold intolerance. Genitourinary:  Negative for dysuria, flank pain, frequency and hematuria. Musculoskeletal:  Negative for arthralgias, back pain and myalgias. Skin:  Negative for rash. Allergic/Immunologic: Negative for environmental allergies and food allergies. Neurological:  Negative for weakness, light-headedness, numbness and headaches. Hematological:  Does not bruise/bleed easily. PAST MEDICAL HISTORY         Diagnosis Date    Cough variant asthma 2/6/2014    Hodgkin disease ([facility] [address]     Hodgkin's    Kidney stone      SURGICAL HISTORY     Patient  has a past surgical history that includes Tympanostomy tube placement (1997); Breast fibroadenoma surgery (01/2013); Dental surgery (01/2022); and [place] tooth extraction (01/06/2022). CURRENT MEDICATIONS       Previous Medications    LORATADINE (CLARITIN PO)    Take by mouth    PRENATAL VIT-FE FUMARATE-FA (PRENATAL PO)    Take by mouth     ALLERGIES     Patient is is allergic to penicillins. FAMILY HISTORY     Patient'sfamily history includes Diabetes in an other family member; High Blood Pressure in her father, maternal aunt, maternal grandfather, maternal grandmother, and mother. SOCIAL HISTORY     Patient  reports that she has never smoked. She has never used smokeless tobacco. She reports current alcohol use. She reports that she does not use drugs. PHYSICAL EXAM     VITALS  BP: 104/72, Temp: 97 °F (36.1 °C), Heart Rate: 96, Resp: 16, SpO2: 99 %  Physical Exam  Vitals and nursing note reviewed. Constitutional:       General: She is awake. She is not in acute distress. Appearance: Normal appearance. She is well-developed. She is not ill-appearing, toxic-appearing or diaphoretic. HENT:      Head: Normocephalic and atraumatic.       Right Ear: Hearing and external ear normal.      Left Ear: Hearing and external ear normal.      Nose: Nose normal.      Mouth/Throat: Pharynx: Oropharynx is clear. Uvula midline. Posterior oropharyngeal erythema present. No pharyngeal swelling, oropharyngeal exudate or uvula swelling. Eyes:      General: Lids are normal. Vision grossly intact. Gaze aligned appropriately. Conjunctiva/sclera: Conjunctivae normal.   Cardiovascular:      Rate and Rhythm: Normal rate and regular rhythm. Pulses: Normal pulses. Heart sounds: Normal heart sounds, S1 normal and S2 normal.   Pulmonary:      Effort: Pulmonary effort is normal.      Breath sounds: Normal breath sounds and air entry. Abdominal:      Tenderness: There is abdominal tenderness in the right upper quadrant. There is no right CVA tenderness or left CVA tenderness. Positive signs include Meraz's sign. Negative signs include Rovsing's sign and McBurney's sign. Musculoskeletal:      Cervical back: Normal range of motion. Skin:     General: Skin is warm. Capillary Refill: Capillary refill takes less than 2 seconds. Neurological:      Mental Status: She is alert and oriented to person, place, and time. Gait: Gait is intact. Psychiatric:         Attention and Perception: Attention normal.         Mood and Affect: Mood normal.         Speech: Speech normal.         Behavior: Behavior normal. Behavior is cooperative. READY CARE COURSE   Labs:  Results for POC orders placed in visit on 07/29/22   POCT Influenza A/B   Result Value Ref Range    Influenza A Ab negative     Influenza B Ab negative      IMAGING:  US GALLBLADDER RUQ    (Results Pending)     Scheduled Meds:  Continuous Infusions:  PRN Meds:. PROCEDURES:  FINAL IMPRESSION      1. Cough    2. RUQ pain      DISPOSITION/PLAN   Recommend supportive treatment at this time. Recommended claritin and flonase. Recommended to limit cough medicine use. Recommend that the patient receive stat US of the RUQ  of the gall bladder to assess for stones. Patient's symptoms consistent with cholelithiasis.  If symptoms continue to worsen, or develops fevers, chills, or vomiting, then seek emergent evaluation in the ED. Went over possible s/e of the medications. Patient would like to proceed with therapy. Discussed signs and symptoms which require immediate follow-up in ED/call to 911. Patient verbalized understanding. On this date 7/29/2022 I have spent 20 minutes reviewing previous notes, test results and face to face with the patient discussing the diagnosis and importance of compliance with the treatment plan as well as documenting on the day of the visit. PATIENT REFERRED TO:  Return if symptoms worsen or fail to improve. DISCHARGE MEDICATIONS:  New Prescriptions    No medications on file     Cannot display discharge medications since this is not an admission.        Amaya Freeman

## 2022-07-30 LAB
C TRACH DNA GENITAL QL NAA+PROBE: NEGATIVE
N. GONORRHOEAE DNA: NEGATIVE

## 2022-08-18 ENCOUNTER — ROUTINE PRENATAL (OUTPATIENT)
Dept: OBGYN CLINIC | Age: 27
End: 2022-08-18
Payer: MEDICAID

## 2022-08-18 VITALS
DIASTOLIC BLOOD PRESSURE: 72 MMHG | WEIGHT: 111 LBS | SYSTOLIC BLOOD PRESSURE: 104 MMHG | BODY MASS INDEX: 19.66 KG/M2 | HEART RATE: 100 BPM

## 2022-08-18 DIAGNOSIS — Z34.82 ENCOUNTER FOR SUPERVISION OF OTHER NORMAL PREGNANCY IN SECOND TRIMESTER: ICD-10-CM

## 2022-08-18 DIAGNOSIS — Z3A.14 14 WEEKS GESTATION OF PREGNANCY: Primary | ICD-10-CM

## 2022-08-18 PROCEDURE — 1036F TOBACCO NON-USER: CPT | Performed by: OBSTETRICS & GYNECOLOGY

## 2022-08-18 PROCEDURE — 99213 OFFICE O/P EST LOW 20 MIN: CPT | Performed by: OBSTETRICS & GYNECOLOGY

## 2022-08-18 PROCEDURE — G8427 DOCREV CUR MEDS BY ELIG CLIN: HCPCS | Performed by: OBSTETRICS & GYNECOLOGY

## 2022-08-18 PROCEDURE — G8420 CALC BMI NORM PARAMETERS: HCPCS | Performed by: OBSTETRICS & GYNECOLOGY

## 2022-08-18 NOTE — PROGRESS NOTES
Patient's last menstrual period was 05/06/2022. Please reference prenatal and OB flow chart for further information  PT here today for routine prenatal care  Pt endorses fetal movement and denies loss of fluid, contractions or vaginal bleeding  Pt without complaints  ROS:  Pt denies headache, dysuria, nausea/vomiting  PE:  /72   Pulse (!) 120   Wt 111 lb (50.3 kg)   LMP 05/06/2022   BMI 19.66 kg/m²   Gen - Alert and oriented x 3  HEENT- NC/AT, CVS - RRR, Lungs - CTAB  Abd - FH below umb  Appropriate fetal growth  Pt seen by MFM - mono/di twins  Pt interested in prophylactic BS with delivery.  PT without a sigfiicant family h/o GYN cancers and pt agrees to f/u postpartum with [address]

## 2022-08-23 ENCOUNTER — HOSPITAL ENCOUNTER (EMERGENCY)
Age: 27
Discharge: HOME | End: 2022-08-23
Attending: EMERGENCY MEDICINE
Payer: MEDICAID

## 2022-08-23 VITALS
WEIGHT: 110 LBS | TEMPERATURE: 98.5 F | HEART RATE: 106 BPM | DIASTOLIC BLOOD PRESSURE: 69 MMHG | SYSTOLIC BLOOD PRESSURE: 110 MMHG | RESPIRATION RATE: 16 BRPM | BODY MASS INDEX: 19.49 KG/M2 | HEIGHT: 63 IN | OXYGEN SATURATION: 100 %

## 2022-08-23 DIAGNOSIS — N93.9 VAGINAL BLEEDING: Primary | ICD-10-CM

## 2022-08-23 PROCEDURE — 99282 EMERGENCY DEPT VISIT SF MDM: CPT

## 2022-08-23 ASSESSMENT — PAIN DESCRIPTION - FREQUENCY: FREQUENCY: CONTINUOUS

## 2022-08-23 ASSESSMENT — PAIN DESCRIPTION - LOCATION: LOCATION: ABDOMEN

## 2022-08-23 ASSESSMENT — PAIN DESCRIPTION - DESCRIPTORS: DESCRIPTORS: CRAMPING

## 2022-08-23 ASSESSMENT — PAIN DESCRIPTION - ONSET: ONSET: ON-GOING

## 2022-08-23 ASSESSMENT — PAIN SCALES - GENERAL: PAINLEVEL_OUTOF10: 4

## 2022-08-23 ASSESSMENT — PAIN - FUNCTIONAL ASSESSMENT
PAIN_FUNCTIONAL_ASSESSMENT: 0-10
PAIN_FUNCTIONAL_ASSESSMENT: PREVENTS OR INTERFERES SOME ACTIVE ACTIVITIES AND ADLS

## 2022-08-23 NOTE — ED TRIAGE NOTES
Pt is approx 15 weeks pregnant, c/o vaginal bleeding and ABD cramping, Pt is A&OX3, calm, ambulatory, afebrile, breathes are equal and unlabored,

## 2022-08-23 NOTE — ED PROVIDER NOTES
[facility] ED  EMERGENCY DEPARTMENT ENCOUNTER      Pt Name: Nahid Lundberg  MRN: [id number]  Elviragfrayne 1995  Date of evaluation: 8/23/2022  Provider: Eva Betancourt MD    CHIEF COMPLAINT       Chief Complaint   Patient presents with    Vaginal Bleeding     Pt is approx 15 weeks pregnant c/o vaginal bleeding and abd cramping that started this AM         HISTORY OF PRESENT ILLNESS   (Location/Symptom, Timing/Onset, Context/Setting, Quality, Duration, Modifying Factors, Severity)  Note limiting factors. 61-year-old female presenting with vaginal bleeding. She notes a dime size clot this morning when she was using the bathroom. No specific pain. She is 15 weeks pregnant with twins and currently follows with an OB doctor. Called this doctor who directed her here. She is Rh +. No other symptoms noted. Nursing Notes were reviewed. REVIEW OF SYSTEMS    (2-9 systems for level 4, 10 or more for level 5)     Review of Systems   Genitourinary:  Positive for vaginal bleeding. All other systems reviewed and are negative. Except as noted above the remainder of the review of systems was reviewed and negative.        PAST MEDICAL HISTORY     Past Medical History:   Diagnosis Date    Cough variant asthma 2/6/2014    Hodgkin disease [facility])     Hodgkin's    Kidney stone          SURGICAL HISTORY       Past Surgical History:   Procedure Laterality Date    BREAST FIBROADENOMA SURGERY  01/2013    DENTAL SURGERY  01/2022    all wisdom teeth removed     TYMPANOSTOMY [place] EXTRACTION  01/06/2022         CURRENT MEDICATIONS       Current Discharge Medication List        CONTINUE these medications which have NOT CHANGED    Details   Loratadine (CLARITIN PO) Take by mouth      Prenatal Vit-Fe Fumarate-FA (PRENATAL PO) Take by mouth             ALLERGIES     Penicillins    FAMILY HISTORY       Family History   Problem Relation Age of Onset    High Blood Pressure Mother     High Blood Pressure Father     High Blood Pressure Maternal Aunt     High Blood Pressure Maternal Grandmother     High Blood Pressure Maternal Grandfather     Diabetes Other         mom's side    Cancer Neg Hx     Breast Cancer Neg Hx     Prostate Cancer Neg Hx     Depression Neg Hx           SOCIAL HISTORY       Social History     Socioeconomic History    Marital status:      Spouse name: None    Number of children: None    Years of education: None    Highest education level: None   Tobacco Use    Smoking status: Never    Smokeless tobacco: Never   Vaping Use    Vaping Use: Never used   Substance and Sexual Activity    Alcohol use: Not Currently     Comment: rare    Drug use: No    Sexual activity: Yes     Partners: Male     Social Determinants of Health     Financial Resource Strain: Low Risk     Difficulty of Paying Living Expenses: Not hard at all   Food Insecurity: No Food Insecurity    Worried About Running Out of Food in the Last Year: Never true    Ran Out of Food in the Last Year: Never true       SCREENINGS    [place] Coma Scale  Eye Opening: Spontaneous  Best Verbal Response: Oriented  Best Motor Response: Obeys commands  [place] Coma Scale Score: 15          PHYSICAL EXAM    (up to 7 for level 4, 8 or more for level 5)     ED Triage Vitals [08/23/22 1028]   BP Temp Temp Source Heart Rate Resp SpO2 Height Weight   110/69 98.5 °F (36.9 °C) Oral (!) 106 16 100 % 5' 3\" (1.6 m) 110 lb (49.9 kg)       Physical Exam  Vitals and nursing note reviewed. Constitutional:       General: She is not in acute distress. Appearance: Normal appearance. She is well-developed. She is not ill-appearing. HENT:      Head: Normocephalic and atraumatic. Mouth/Throat:      Mouth: Mucous membranes are moist.      Pharynx: Oropharynx is clear. Eyes:      Extraocular Movements: Extraocular movements intact.       Conjunctiva/sclera: Conjunctivae normal.   Cardiovascular:      Rate and Rhythm: Normal rate and regular rhythm. Pulmonary:      Effort: Pulmonary effort is normal.      Breath sounds: Normal breath sounds. Abdominal:      General: Bowel sounds are normal.      Palpations: Abdomen is soft. Tenderness: There is no abdominal tenderness. Comments: Gravid uterus   Musculoskeletal:         General: No deformity. Normal range of motion. Cervical back: Normal range of motion and neck supple. Skin:     General: Skin is warm and dry. Capillary Refill: Capillary refill takes less than 2 seconds. Neurological:      General: No focal deficit present. Mental Status: She is alert and oriented to person, place, and time. Mental status is at baseline. Cranial Nerves: No cranial nerve deficit. Psychiatric:         Thought Content: Thought content normal.       DIAGNOSTIC RESULTS     EKG: All EKG's are interpreted by the Emergency Department Physician who either signs or Co-signs this chart in the absence of a cardiologist.    RADIOLOGY:   Non-plain film images such as CT, Ultrasound and MRI are read by the radiologist. Plain radiographic images are visualized and preliminarily interpreted by the emergency physician with the below findings:    Interpretation per the Radiologist below, if available at the time of this note:    No orders to display       LABS:  Labs Reviewed - No data to display    All other labs were within normal range or not returned as of this dictation. EMERGENCY DEPARTMENT COURSE and DIFFERENTIAL DIAGNOSIS/MDM:   Vitals:    Vitals:    08/23/22 1028   BP: 110/69   Pulse: (!) 106   Resp: 16   Temp: 98.5 °F (36.9 °C)   TempSrc: Oral   SpO2: 100%   Weight: 110 lb (49.9 kg)   Height: 5' 3\" (1.6 m)       MDM  Number of Diagnoses or Management Options  Vaginal bleeding  Diagnosis management comments: Ultrasound shows good HR for both fetuses. Patient has not other complaints. She will be discharged home in good condition.   She has been hemodynamically stable throughout ED course and is appropriate for outpatient follow up. Patient should follow up with OB in 2-3 days or return to ED immediately for any new or worsening symptoms. Patient is well appearing on discharge and agreeable with plan of care. Procedures    CRITICAL CARE TIME   Total Critical Care time was 0 minutes, excluding separately reportable procedures. There was a high probability of clinically significant/life threatening deterioration in the patient's condition which required my urgent intervention. FINAL IMPRESSION      1.  Vaginal bleeding          DISPOSITION/PLAN   DISPOSITION Decision To Discharge 08/23/2022 11:18:15 AM      (Please note that portions of this note were completed with a voice recognition program.  Efforts were made to edit the dictations but occasionally words are mis-transcribed.)    Shanice Klein MD (electronically signed)  Attending Emergency Physician        Shanice Klein MD  08/23/22 2498

## 2022-09-08 ENCOUNTER — TELEPHONE (OUTPATIENT)
Dept: OBGYN CLINIC | Age: 27
End: 2022-09-08

## 2022-09-08 NOTE — TELEPHONE ENCOUNTER
Pt would like to know if the movement that she was feeling is normal? She stated that it was four quick jabs.  Please advise

## 2022-09-14 ENCOUNTER — ROUTINE PRENATAL (OUTPATIENT)
Dept: OBGYN CLINIC | Age: 27
End: 2022-09-14
Payer: MEDICAID

## 2022-09-14 VITALS
WEIGHT: 118 LBS | DIASTOLIC BLOOD PRESSURE: 76 MMHG | SYSTOLIC BLOOD PRESSURE: 104 MMHG | HEART RATE: 102 BPM | BODY MASS INDEX: 20.9 KG/M2

## 2022-09-14 DIAGNOSIS — Z3A.18 18 WEEKS GESTATION OF PREGNANCY: ICD-10-CM

## 2022-09-14 DIAGNOSIS — O30.032 MONOCHORIONIC DIAMNIOTIC TWIN GESTATION IN SECOND TRIMESTER: Primary | ICD-10-CM

## 2022-09-14 PROCEDURE — G8420 CALC BMI NORM PARAMETERS: HCPCS | Performed by: OBSTETRICS & GYNECOLOGY

## 2022-09-14 PROCEDURE — 1036F TOBACCO NON-USER: CPT | Performed by: OBSTETRICS & GYNECOLOGY

## 2022-09-14 PROCEDURE — G8427 DOCREV CUR MEDS BY ELIG CLIN: HCPCS | Performed by: OBSTETRICS & GYNECOLOGY

## 2022-09-14 PROCEDURE — 99213 OFFICE O/P EST LOW 20 MIN: CPT | Performed by: OBSTETRICS & GYNECOLOGY

## 2022-09-14 NOTE — PROGRESS NOTES
Patient's last menstrual period was 05/06/2022.   Please reference prenatal and OB flow chart for further information  PT here today for routine prenatal care  Pt endorses fetal movement and denies loss of fluid, contractions or vaginal bleeding  Pt without complaints  ROS:  Pt denies headache, dysuria, nausea/vomiting  PE:  /76   Pulse (!) 102   Wt 118 lb (53.5 kg)   LMP 05/06/2022   BMI 20.90 kg/m²   Gen - Alert and oriented x 3  HEENT- NC/AT, CVS - RRR, Lungs - CTAB  Abd - FH @ umb  Appropriate fetal growth  PT seen by MFM for mono/di twins  Pt asked about family h/o GYN cancers and maternal grandmother with ovarian cancer, pt requesting prophylactic BS with planned primary CD for twin gestation  Plan to fill out ethics/sterility form at 28-30wks

## 2022-10-17 ENCOUNTER — ROUTINE PRENATAL (OUTPATIENT)
Dept: OBGYN CLINIC | Age: 27
End: 2022-10-17
Payer: MEDICAID

## 2022-10-17 VITALS — DIASTOLIC BLOOD PRESSURE: 64 MMHG | BODY MASS INDEX: 23.03 KG/M2 | WEIGHT: 130 LBS | SYSTOLIC BLOOD PRESSURE: 98 MMHG

## 2022-10-17 DIAGNOSIS — Z34.82 ENCOUNTER FOR SUPERVISION OF OTHER NORMAL PREGNANCY IN SECOND TRIMESTER: Primary | ICD-10-CM

## 2022-10-17 DIAGNOSIS — Z3A.23 23 WEEKS GESTATION OF PREGNANCY: ICD-10-CM

## 2022-10-17 PROCEDURE — 99213 OFFICE O/P EST LOW 20 MIN: CPT | Performed by: OBSTETRICS & GYNECOLOGY

## 2022-10-17 PROCEDURE — G8420 CALC BMI NORM PARAMETERS: HCPCS | Performed by: OBSTETRICS & GYNECOLOGY

## 2022-10-17 PROCEDURE — G8484 FLU IMMUNIZE NO ADMIN: HCPCS | Performed by: OBSTETRICS & GYNECOLOGY

## 2022-10-17 PROCEDURE — 1036F TOBACCO NON-USER: CPT | Performed by: OBSTETRICS & GYNECOLOGY

## 2022-10-17 PROCEDURE — G8427 DOCREV CUR MEDS BY ELIG CLIN: HCPCS | Performed by: OBSTETRICS & GYNECOLOGY

## 2022-10-17 NOTE — PROGRESS NOTES
Patient's last menstrual period was 05/06/2022.   Please reference prenatal and OB flow chart for further information  PT here today for routine prenatal care  Pt endorses fetal movement and denies loss of fluid, contractions or vaginal bleeding  Pt without complaints  ROS:  Pt denies headache, dysuria, nausea/vomiting  PE:  BP 98/64   Wt 130 lb (59 kg)   LMP 05/06/2022   BMI 23.03 kg/m²   Gen - Alert and oriented x 3  HEENT- NC/AT, CVS - RRR, Lungs - CTAB  Abd - FH 26  Appropriate fetal growth  Pt asked about family h/o GYN cancers and maternal grandmother with ovarian cancer, pt requesting prophylactic BS with planned primary CD for twin gestation  Plan to fill out ethics/sterility form at 35-34wks  US with [facility] reviewed - ceph/ceph  1hr at next apt

## 2022-11-22 ENCOUNTER — ROUTINE PRENATAL (OUTPATIENT)
Dept: OBGYN CLINIC | Age: 27
End: 2022-11-22
Payer: MEDICAID

## 2022-11-22 VITALS
DIASTOLIC BLOOD PRESSURE: 76 MMHG | SYSTOLIC BLOOD PRESSURE: 118 MMHG | HEART RATE: 106 BPM | BODY MASS INDEX: 25.51 KG/M2 | WEIGHT: 144 LBS

## 2022-11-22 DIAGNOSIS — Z34.83 ENCOUNTER FOR SUPERVISION OF OTHER NORMAL PREGNANCY IN THIRD TRIMESTER: ICD-10-CM

## 2022-11-22 DIAGNOSIS — Z34.83 ENCOUNTER FOR SUPERVISION OF OTHER NORMAL PREGNANCY IN THIRD TRIMESTER: Primary | ICD-10-CM

## 2022-11-22 DIAGNOSIS — Z3A.28 28 WEEKS GESTATION OF PREGNANCY: ICD-10-CM

## 2022-11-22 DIAGNOSIS — Z23 NEED FOR TDAP VACCINATION: ICD-10-CM

## 2022-11-22 LAB
GLUCOSE, 1HR PP: 116 MG/DL (ref 60–140)
HCT VFR BLD CALC: 26.6 % (ref 37–47)
HEMOGLOBIN: 9.1 G/DL (ref 12–16)

## 2022-11-22 PROCEDURE — G8419 CALC BMI OUT NRM PARAM NOF/U: HCPCS | Performed by: OBSTETRICS & GYNECOLOGY

## 2022-11-22 PROCEDURE — G8484 FLU IMMUNIZE NO ADMIN: HCPCS | Performed by: OBSTETRICS & GYNECOLOGY

## 2022-11-22 PROCEDURE — 99213 OFFICE O/P EST LOW 20 MIN: CPT | Performed by: OBSTETRICS & GYNECOLOGY

## 2022-11-22 PROCEDURE — 1036F TOBACCO NON-USER: CPT | Performed by: OBSTETRICS & GYNECOLOGY

## 2022-11-22 PROCEDURE — G8427 DOCREV CUR MEDS BY ELIG CLIN: HCPCS | Performed by: OBSTETRICS & GYNECOLOGY

## 2022-11-22 RX ORDER — SODIUM CHLORIDE 0.9 % (FLUSH) 0.9 %
10 SYRINGE (ML) INJECTION EVERY 12 HOURS SCHEDULED
OUTPATIENT
Start: 2022-11-22

## 2022-11-22 RX ORDER — ONDANSETRON 2 MG/ML
4 INJECTION INTRAMUSCULAR; INTRAVENOUS EVERY 6 HOURS PRN
OUTPATIENT
Start: 2022-11-22

## 2022-11-22 RX ORDER — SODIUM CHLORIDE 9 MG/ML
INJECTION, SOLUTION INTRAVENOUS PRN
OUTPATIENT
Start: 2022-11-22

## 2022-11-22 RX ORDER — SODIUM CHLORIDE, SODIUM LACTATE, POTASSIUM CHLORIDE, AND CALCIUM CHLORIDE .6; .31; .03; .02 G/100ML; G/100ML; G/100ML; G/100ML
1000 INJECTION, SOLUTION INTRAVENOUS ONCE
OUTPATIENT
Start: 2022-11-22 | End: 2022-11-22

## 2022-11-22 RX ORDER — SODIUM CHLORIDE 0.9 % (FLUSH) 0.9 %
10 SYRINGE (ML) INJECTION PRN
OUTPATIENT
Start: 2022-11-22

## 2022-11-22 RX ORDER — SODIUM CHLORIDE, SODIUM LACTATE, POTASSIUM CHLORIDE, CALCIUM CHLORIDE 600; 310; 30; 20 MG/100ML; MG/100ML; MG/100ML; MG/100ML
INJECTION, SOLUTION INTRAVENOUS CONTINUOUS
OUTPATIENT
Start: 2022-11-22

## 2022-11-22 NOTE — PROGRESS NOTES
Patient's last menstrual period was 05/06/2022.   Please reference prenatal and OB flow chart for further information  PT here today for routine prenatal care  Pt endorses fetal movement and denies loss of fluid, contractions or vaginal bleeding  Pt without complaints  ROS:  Pt denies headache, dysuria, nausea/vomiting  PE:  /76   Pulse (!) 106   Wt 144 lb (65.3 kg)   LMP 05/06/2022   BMI 25.51 kg/m²   Gen - Alert and oriented x 3  HEENT- NC/AT, CVS - RRR, Lungs - CTAB  Abd - FH 32  Appropriate fetal growth  US by [facility] reviewed - concordant growth  1hr pending  PT planned for CD with BS 1/19/23 (per M delivery 34-37wks)  Will plan to complete ANS prior to delivery

## 2022-11-29 ENCOUNTER — TELEPHONE (OUTPATIENT)
Dept: OBGYN CLINIC | Age: 27
End: 2022-11-29

## 2022-11-29 NOTE — TELEPHONE ENCOUNTER
Pt. Is 29 weeks pregnant with twins. Pt. Called and said her child has RSV and was concerned about what to do for herself. . Pt. Advised to rest and monitor symptoms and if anything seems wrong, to go to triage at Labor and Delivery to be evaluated. Safe medications during Pregnancy sent through [address]

## 2022-12-13 ENCOUNTER — ROUTINE PRENATAL (OUTPATIENT)
Dept: OBGYN CLINIC | Age: 27
End: 2022-12-13
Payer: MEDICAID

## 2022-12-13 VITALS
BODY MASS INDEX: 26.22 KG/M2 | DIASTOLIC BLOOD PRESSURE: 70 MMHG | WEIGHT: 148 LBS | SYSTOLIC BLOOD PRESSURE: 112 MMHG | HEART RATE: 99 BPM

## 2022-12-13 DIAGNOSIS — Z3A.31 31 WEEKS GESTATION OF PREGNANCY: ICD-10-CM

## 2022-12-13 DIAGNOSIS — Z34.83 ENCOUNTER FOR SUPERVISION OF OTHER NORMAL PREGNANCY IN THIRD TRIMESTER: Primary | ICD-10-CM

## 2022-12-13 PROCEDURE — G8419 CALC BMI OUT NRM PARAM NOF/U: HCPCS | Performed by: OBSTETRICS & GYNECOLOGY

## 2022-12-13 PROCEDURE — G8484 FLU IMMUNIZE NO ADMIN: HCPCS | Performed by: OBSTETRICS & GYNECOLOGY

## 2022-12-13 PROCEDURE — 99213 OFFICE O/P EST LOW 20 MIN: CPT | Performed by: OBSTETRICS & GYNECOLOGY

## 2022-12-13 PROCEDURE — 1036F TOBACCO NON-USER: CPT | Performed by: OBSTETRICS & GYNECOLOGY

## 2022-12-13 PROCEDURE — G8427 DOCREV CUR MEDS BY ELIG CLIN: HCPCS | Performed by: OBSTETRICS & GYNECOLOGY

## 2022-12-13 RX ORDER — FERROUS SULFATE 325(65) MG
325 TABLET ORAL 2 TIMES DAILY
Qty: 180 TABLET | Refills: 2 | Status: SHIPPED | OUTPATIENT
Start: 2022-12-13

## 2022-12-13 RX ORDER — DOCUSATE SODIUM 100 MG/1
100 CAPSULE, LIQUID FILLED ORAL 2 TIMES DAILY
Qty: 180 CAPSULE | Refills: 2 | Status: SHIPPED | OUTPATIENT
Start: 2022-12-13 | End: 2023-01-12

## 2022-12-13 NOTE — PROGRESS NOTES
Patient's last menstrual period was 05/06/2022.   Please reference prenatal and OB flow chart for further information  PT here today for routine prenatal care  Pt endorses fetal movement and denies loss of fluid, contractions or vaginal bleeding  Pt without complaints  ROS:  Pt denies headache, dysuria, nausea/vomiting  PE:  /70   Pulse 99   Wt 148 lb (67.1 kg)   LMP 05/06/2022   BMI 26.22 kg/m²   Gen - Alert and oriented x 3  HEENT- NC/AT, CVS - RRR, Lungs - CTAB  Abd - FH 34  Appropriate fetal growth  1hr 116, hgb 9.1  Rx FeSO4 and colace to pharmacy   PT planned for CD with BS 1/19/23 (per MFM delivery 34-37wks due to mono/di twins)  Will plan to complete ANS prior to delivery

## 2022-12-14 ENCOUNTER — HOSPITAL ENCOUNTER (OUTPATIENT)
Age: 27
Discharge: HOME | End: 2022-12-14
Attending: OBSTETRICS & GYNECOLOGY | Admitting: OBSTETRICS & GYNECOLOGY
Payer: MEDICAID

## 2022-12-14 VITALS — RESPIRATION RATE: 16 BRPM | TEMPERATURE: 98 F

## 2022-12-14 PROBLEM — E86.0 DEHYDRATION DURING PREGNANCY: Status: ACTIVE | Noted: 2022-12-14

## 2022-12-14 PROBLEM — O30.033 MONOCHORIONIC DIAMNIOTIC TWIN GESTATION IN THIRD TRIMESTER: Status: ACTIVE | Noted: 2022-12-14

## 2022-12-14 PROBLEM — O99.280 DEHYDRATION DURING PREGNANCY: Status: ACTIVE | Noted: 2022-12-14

## 2022-12-14 LAB
AMPHETAMINE SCREEN, URINE: NORMAL
BACTERIA: ABNORMAL /HPF
BARBITURATE SCREEN URINE: NORMAL
BENZODIAZEPINE SCREEN, URINE: NORMAL
BILIRUBIN URINE: NEGATIVE
BLOOD, URINE: NEGATIVE
CANNABINOID SCREEN URINE: NORMAL
CLARITY: CLEAR
CLUE CELLS: NORMAL
COCAINE METABOLITE SCREEN URINE: NORMAL
COLOR: YELLOW
EPITHELIAL CELLS, UA: ABNORMAL /HPF (ref 0–5)
FENTANYL SCREEN, URINE: NORMAL
GLUCOSE URINE: NEGATIVE MG/DL
HYALINE CASTS: ABNORMAL /HPF (ref 0–5)
KETONES, URINE: ABNORMAL MG/DL
LEUKOCYTE ESTERASE, URINE: NEGATIVE
Lab: NORMAL
METHADONE SCREEN, URINE: NORMAL
NITRITE, URINE: NEGATIVE
OPIATE SCREEN URINE: NORMAL
OXYCODONE URINE: NORMAL
PH UA: 6 (ref 5–9)
PHENCYCLIDINE SCREEN URINE: NORMAL
PROPOXYPHENE SCREEN: NORMAL
PROTEIN UA: 30 MG/DL
RBC UA: ABNORMAL /HPF (ref 0–5)
SPECIFIC GRAVITY UA: 1.03 (ref 1–1.03)
TRICHOMONAS PREP: NORMAL
TRICHOMONAS VAGINALIS SCREEN: NEGATIVE
URINE REFLEX TO CULTURE: ABNORMAL
UROBILINOGEN, URINE: 0.2 E.U./DL
WBC UA: ABNORMAL /HPF (ref 0–5)
YEAST WET PREP: NORMAL

## 2022-12-14 PROCEDURE — 80307 DRUG TEST PRSMV CHEM ANLYZR: CPT

## 2022-12-14 PROCEDURE — 81001 URINALYSIS AUTO W/SCOPE: CPT

## 2022-12-14 PROCEDURE — 87808 TRICHOMONAS ASSAY W/OPTIC: CPT

## 2022-12-14 PROCEDURE — 87210 SMEAR WET MOUNT SALINE/INK: CPT

## 2022-12-14 PROCEDURE — 99283 EMERGENCY DEPT VISIT LOW MDM: CPT

## 2022-12-14 NOTE — PROGRESS NOTES
Pt here with identical twin pregnancy, c/o abdominal cramping and back pain since this am. States she has been cramping for couple days but today started with the back pain as well. Denies recent intercourse, denies vaginal bleeding or leaking, feels fetal movement from both twins.

## 2022-12-14 NOTE — ED TRIAGE NOTES
Department of Obstetrics and Gynecology  Labor and Delivery  Zahraa Sepulveda MD: [facility] Triage Note      SUBJECTIVE:  Patient is a 32 yo  female @ 31.5 weeks, EDC 2/10/2023,  s/b LMP 2022 who presents with lower abdominal cramping and back pain since this morning. She was seen in office yesterday and had unremarkable visit. She denies any recent coitus, VB, or VD. She reports +FM x 2. Her pregnancy is complicated by the following:     Monchorionic, Diamniotic Twin Pregnancy  Anemia  Asthma  History of Hodgkin's Lymphoma  History of Kidney Stones    She sees MFM weekly for Dopplers and assessment for Twin to Twin Transfusion. She is scheduled form CD with BS on 2023. She has not received Steroids for FLM this pregnancy. OBJECTIVE    ROS:  Gen: Negative  CV: Negative  Lungs: Negative  Abdomen: +FM x 2, Cramping; Back Pain  Pelvis: Negative  Rest of systems reviewed and found to be negative.     Vitals:  Temp 98 °F (36.7 °C)   Resp 16   LMP 2022     PE:   Gen: A x O x 3, in NAD  Abdomen: Gravid, +FM x 2  Cervix:             Dilation:           Effacement:           Station:           Consistency:           Position:         Membranes: Intact      Fetal heart rate:  Category I       Baseline Heart Rate:  Baby A   130s AGA    Baby B  140s AGA       Accelerations:  present       Decelerations:  prolonged       Variability:  moderate    Contraction frequency: irritability    DATA:  Results     Component Value Units   Urinalysis with Reflex to Culture [[id number]] (Abnormal)    Collected: 22 1057    Updated: 22 1142    Specimen Source: Urine     Color, UA Yellow    Clarity, UA Clear    Glucose, Ur Negative mg/dL    Bilirubin Urine Negative    Ketones, Urine TRACE Abnormal  mg/dL    Specific Gravity, UA 1.035    Blood, Urine Negative    pH, UA 6.0    Protein, UA 30 Abnormal  mg/dL    Urobilinogen, Urine 0.2 E.U./dL    Nitrite, Urine Negative    Leukocyte Esterase, Urine Negative Urine Drug Screen [[id number]]    Collected: 12/14/22 1057    Updated: 12/14/22 1115    Specimen Source: Urine     Amphetamine Screen, Urine Neg    Barbiturate Screen, Ur Neg    Benzodiazepine Screen, Urine Neg    Cannabinoid Scrn, Ur Neg    Cocaine Metabolite Screen, Urine Neg    Opiate Scrn, Ur Neg    PCP Screen, Urine Neg    Methadone Screen, Urine Neg    Propoxyphene Scrn, Ur Neg    Oxycodone Urine Neg    FENTANYL SCREEN, URINE Neg    Drug Screen Comment: see below    Comment: This method is a screening test to detect only these drug   classes as part of a medical workup. Confirmatory testing   by another method should be ordered if clinically indicated. ASSESSMENT & PLAN:      Assessment:   IUP @ 31.5 weeks, Mono-Di Twins  Abdominal Cramping     Plan:    NST reassuring  Cervix is closed. Wet prep pending. UA showing dehydration. Reassurance given. Encouraged increase in water intake  Discharge home to follow up with Dr. Sanjay Lizama and MFKADEN with next scheduled appointment.

## 2022-12-21 ENCOUNTER — HOSPITAL ENCOUNTER (EMERGENCY)
Age: 27
Discharge: HOME | End: 2022-12-22
Payer: MEDICAID

## 2022-12-21 ENCOUNTER — OFFICE VISIT (OUTPATIENT)
Dept: FAMILY MEDICINE CLINIC | Age: 27
End: 2022-12-21
Payer: MEDICAID

## 2022-12-21 VITALS
HEIGHT: 63 IN | DIASTOLIC BLOOD PRESSURE: 74 MMHG | BODY MASS INDEX: 26.22 KG/M2 | WEIGHT: 148 LBS | OXYGEN SATURATION: 97 % | TEMPERATURE: 98.9 F | HEART RATE: 130 BPM | SYSTOLIC BLOOD PRESSURE: 132 MMHG

## 2022-12-21 DIAGNOSIS — J10.1 INFLUENZA A: Primary | ICD-10-CM

## 2022-12-21 DIAGNOSIS — B34.9 VIRAL ILLNESS: ICD-10-CM

## 2022-12-21 DIAGNOSIS — N30.00 ACUTE CYSTITIS WITHOUT HEMATURIA: ICD-10-CM

## 2022-12-21 LAB
INFLUENZA A ANTIBODY: DETECTED
INFLUENZA B ANTIBODY: ABNORMAL
Lab: NORMAL
PERFORMING INSTRUMENT: NORMAL
QC PASS/FAIL: NORMAL
SARS-COV-2, POC: NORMAL

## 2022-12-21 PROCEDURE — 99213 OFFICE O/P EST LOW 20 MIN: CPT | Performed by: NURSE PRACTITIONER

## 2022-12-21 PROCEDURE — 1036F TOBACCO NON-USER: CPT | Performed by: NURSE PRACTITIONER

## 2022-12-21 PROCEDURE — 87086 URINE CULTURE/COLONY COUNT: CPT

## 2022-12-21 PROCEDURE — 81001 URINALYSIS AUTO W/SCOPE: CPT

## 2022-12-21 PROCEDURE — 87426 SARSCOV CORONAVIRUS AG IA: CPT | Performed by: NURSE PRACTITIONER

## 2022-12-21 PROCEDURE — 83735 ASSAY OF MAGNESIUM: CPT

## 2022-12-21 PROCEDURE — 87804 INFLUENZA ASSAY W/OPTIC: CPT | Performed by: NURSE PRACTITIONER

## 2022-12-21 PROCEDURE — G8427 DOCREV CUR MEDS BY ELIG CLIN: HCPCS | Performed by: NURSE PRACTITIONER

## 2022-12-21 PROCEDURE — 85025 COMPLETE CBC W/AUTO DIFF WBC: CPT

## 2022-12-21 PROCEDURE — 6360000002 HC RX W HCPCS: Performed by: PERSONAL EMERGENCY RESPONSE ATTENDANT

## 2022-12-21 PROCEDURE — G8419 CALC BMI OUT NRM PARAM NOF/U: HCPCS | Performed by: NURSE PRACTITIONER

## 2022-12-21 PROCEDURE — 2580000003 HC RX 258: Performed by: PERSONAL EMERGENCY RESPONSE ATTENDANT

## 2022-12-21 PROCEDURE — 99284 EMERGENCY DEPT VISIT MOD MDM: CPT

## 2022-12-21 PROCEDURE — 36415 COLL VENOUS BLD VENIPUNCTURE: CPT

## 2022-12-21 PROCEDURE — 96374 THER/PROPH/DIAG INJ IV PUSH: CPT

## 2022-12-21 PROCEDURE — G8484 FLU IMMUNIZE NO ADMIN: HCPCS | Performed by: NURSE PRACTITIONER

## 2022-12-21 PROCEDURE — 80053 COMPREHEN METABOLIC PANEL: CPT

## 2022-12-21 RX ORDER — 0.9 % SODIUM CHLORIDE 0.9 %
1000 INTRAVENOUS SOLUTION INTRAVENOUS ONCE
Status: COMPLETED | OUTPATIENT
Start: 2022-12-21 | End: 2022-12-22

## 2022-12-21 RX ORDER — ONDANSETRON 2 MG/ML
4 INJECTION INTRAMUSCULAR; INTRAVENOUS ONCE
Status: COMPLETED | OUTPATIENT
Start: 2022-12-21 | End: 2022-12-21

## 2022-12-21 RX ORDER — OSELTAMIVIR PHOSPHATE 75 MG/1
75 CAPSULE ORAL 2 TIMES DAILY
Qty: 10 CAPSULE | Refills: 0 | Status: SHIPPED | OUTPATIENT
Start: 2022-12-21 | End: 2022-12-26

## 2022-12-21 RX ADMIN — ONDANSETRON 4 MG: 2 INJECTION INTRAMUSCULAR; INTRAVENOUS at 23:27

## 2022-12-21 RX ADMIN — SODIUM CHLORIDE 1000 ML: 9 INJECTION, SOLUTION INTRAVENOUS at 23:26

## 2022-12-21 SDOH — ECONOMIC STABILITY: FOOD INSECURITY: WITHIN THE PAST 12 MONTHS, THE FOOD YOU BOUGHT JUST DIDN'T LAST AND YOU DIDN'T HAVE MONEY TO GET MORE.: NEVER TRUE

## 2022-12-21 SDOH — ECONOMIC STABILITY: FOOD INSECURITY: WITHIN THE PAST 12 MONTHS, YOU WORRIED THAT YOUR FOOD WOULD RUN OUT BEFORE YOU GOT MONEY TO BUY MORE.: NEVER TRUE

## 2022-12-21 ASSESSMENT — ENCOUNTER SYMPTOMS
SHORTNESS OF BREATH: 0
SORE THROAT: 0
WHEEZING: 0
COLOR CHANGE: 0
SHORTNESS OF BREATH: 0
VOMITING: 0
DIARRHEA: 0
RHINORRHEA: 1
SORE THROAT: 1
RHINORRHEA: 1
NAUSEA: 1
COUGH: 1
VOMITING: 0
BLOOD IN STOOL: 0
COUGH: 1
DIARRHEA: 0
ABDOMINAL PAIN: 0
NAUSEA: 1

## 2022-12-21 ASSESSMENT — SOCIAL DETERMINANTS OF HEALTH (SDOH): HOW HARD IS IT FOR YOU TO PAY FOR THE VERY BASICS LIKE FOOD, HOUSING, MEDICAL CARE, AND HEATING?: NOT HARD AT ALL

## 2022-12-21 ASSESSMENT — PAIN - FUNCTIONAL ASSESSMENT: PAIN_FUNCTIONAL_ASSESSMENT: NONE - DENIES PAIN

## 2022-12-21 NOTE — LETTER
[address] [address]  Phone: [phone]  Fax: [phone] [address] ADRIAN Walker CNP        December 21, 2022     Patient: Jacqueline Hollis   YOB: 1995   Date of Visit: 12/21/2022       To Whom it May Concern:    Roverto Brownlee was seen in my clinic on 12/21/2022. She may return to work on 12/26 as long as no fever or chills for 24 hours without taking fever reducing medication. If you have any questions or concerns, please don't hesitate to call.     Sincerely,         ADRIAN Dunn CNP

## 2022-12-21 NOTE — PROGRESS NOTES
Subjective:      Patient ID: Christene Riedel is a 33 y.o. female who presents today for:  Chief Complaint   Patient presents with    URI     Chills, body aches, head aches, cough, congestion start 12/21       URI   Associated symptoms include congestion, coughing, nausea, rhinorrhea and a sore throat. Pertinent negatives include no diarrhea, headaches, rash, vomiting or wheezing.        It was overnight she got sick   Yesterday a sore throat   Sneezing   Took robitussin before bed then woke up   She is pregnant with twins -Jan 18th   Chills   Body aches  99.5          Past Medical History:   Diagnosis Date    Cough variant asthma 2/6/2014    Hodgkin disease ([facility] [address]     Hodgkin's    Kidney stone      Past Surgical History:   Procedure Laterality Date    BREAST FIBROADENOMA SURGERY  01/2013    DENTAL SURGERY  01/2022    all wisdom teeth removed     TYMPANOSTOMY TUBE PLACEMENT  1997    WISDOM TOOTH EXTRACTION  01/06/2022     Social History     Socioeconomic History    Marital status:      Spouse name: Not on file    Number of children: Not on file    Years of education: Not on file    Highest education level: Not on file   Occupational History    Not on file   Tobacco Use    Smoking status: Never    Smokeless tobacco: Never   Vaping Use    Vaping Use: Never used   Substance and Sexual Activity    Alcohol use: Not Currently     Comment: rare    Drug use: No    Sexual activity: Yes     Partners: Male   Other Topics Concern    Not on file   Social History Narrative    Not on file     Social Determinants of Health     Financial Resource Strain: Low Risk     Difficulty of Paying Living Expenses: Not hard at all   Food Insecurity: No Food Insecurity    Worried About Running Out of Food in the Last Year: Never true    Ran Out of Food in the Last Year: Never true   Transportation Needs: Not on file   Physical Activity: Not on file   Stress: Not on file   Social Connections: Not on file   Intimate Partner Violence: Not on file   Housing Stability: Not on file     Family History   Problem Relation Age of Onset    High Blood Pressure Mother     High Blood Pressure Father     High Blood Pressure Maternal Aunt     High Blood Pressure Maternal Grandmother     High Blood Pressure Maternal Grandfather     Diabetes Other         mom's side    Cancer Neg Hx     Breast Cancer Neg Hx     Prostate Cancer Neg Hx     Depression Neg Hx      Allergies   Allergen Reactions    Penicillins Hives     Current Outpatient Medications   Medication Sig Dispense Refill    Pediatric Multivitamins-Iron (POLY-VITAMIN/IRON) 10 MG/ML SOLN Take by mouth      oseltamivir (TAMIFLU) 75 MG capsule Take 1 capsule by mouth 2 times daily for 5 days 10 capsule 0    ferrous sulfate (IRON 325) 325 (65 Fe) MG tablet Take 1 tablet by mouth 2 times daily (Patient not taking: No sig reported) 180 tablet 2    docusate sodium (COLACE) 100 MG capsule Take 1 capsule by mouth 2 times daily (Patient not taking: No sig reported) 180 capsule 2    Loratadine (CLARITIN PO) Take by mouth (Patient not taking: No sig reported)      Prenatal Vit-Fe Fumarate-FA (PRENATAL PO) Take by mouth (Patient not taking: Reported on 12/21/2022)       No current facility-administered medications for this visit. Review of Systems   Constitutional:  Positive for chills, diaphoresis and fever. Negative for fatigue. HENT:  Positive for congestion, rhinorrhea and sore throat. Respiratory:  Positive for cough. Negative for shortness of breath and wheezing. Gastrointestinal:  Positive for nausea. Negative for diarrhea and vomiting. Musculoskeletal:  Positive for myalgias. Skin:  Negative for rash. Neurological:  Negative for headaches. Psychiatric/Behavioral:  Negative for agitation, confusion and hallucinations.       Objective:   /74   Pulse (!) 130   Temp 98.9 °F (37.2 °C)   Ht 5' 3\" (1.6 m)   Wt 148 lb (67.1 kg)   LMP 05/06/2022   SpO2 97%   BMI 26.22 kg/m²     Physical Exam  Vitals reviewed. Constitutional:       Appearance: Normal appearance. HENT:      Head: Normocephalic and atraumatic. Right Ear: Hearing, tympanic membrane, ear canal and external ear normal. No middle ear effusion. No foreign body. Tympanic membrane is not injected, erythematous or bulging. Left Ear: Hearing, tympanic membrane, ear canal and external ear normal.  No middle ear effusion. No foreign body. Tympanic membrane is not injected, erythematous or bulging. Nose: Nose normal. No congestion or rhinorrhea. Right Nostril: No foreign body. Left Nostril: No foreign body. Right Turbinates: Enlarged and swollen. Left Turbinates: Not enlarged. Right Sinus: No maxillary sinus tenderness or frontal sinus tenderness. Left Sinus: No maxillary sinus tenderness or frontal sinus tenderness. Comments: Right nares with blood in it      Mouth/Throat:      Lips: Pink. Mouth: Mucous membranes are moist.      Pharynx: Oropharynx is clear. Uvula midline. Posterior oropharyngeal erythema present. No pharyngeal swelling, oropharyngeal exudate or uvula swelling. Tonsils: No tonsillar exudate or tonsillar abscesses. Eyes:      General: Lids are normal.         Right eye: No foreign body. Left eye: No foreign body. Extraocular Movements: Extraocular movements intact. Conjunctiva/sclera: Conjunctivae normal.   Cardiovascular:      Rate and Rhythm: Normal rate and regular rhythm. Heart sounds: Normal heart sounds. Pulmonary:      Effort: Pulmonary effort is normal. No tachypnea, accessory muscle usage or respiratory distress. Breath sounds: Normal breath sounds. No wheezing or rhonchi. Abdominal:      Tenderness: There is no abdominal tenderness. There is no guarding. Musculoskeletal:         General: Normal range of motion. Cervical back: Normal range of motion. Lymphadenopathy:      Cervical: No cervical adenopathy.       Upper Body:      Right upper body: No supraclavicular adenopathy. Left upper body: No supraclavicular adenopathy. Skin:     General: Skin is warm and dry. Capillary Refill: Capillary refill takes less than 2 seconds. Neurological:      General: No focal deficit present. Mental Status: She is alert and oriented to person, place, and time. Gait: Gait is intact. Psychiatric:         Mood and Affect: Mood normal.         Speech: Speech normal.         Behavior: Behavior normal. Behavior is cooperative. Thought Content: Thought content normal.         Judgment: Judgment normal.       Assessment:       Diagnosis Orders   1. Influenza A  oseltamivir (TAMIFLU) 75 MG capsule      2. Viral illness  POCT COVID-19, Antigen    POCT Influenza A/B        Results for POC orders placed in visit on 12/21/22   POCT Influenza A/B   Result Value Ref Range    Influenza A Ab detected     Influenza B Ab neg       Plan:   She should call OBGYN and let them know that she has the flu, be sure they approve the Tamiflu, rest, push fluids. Continue Robitussin PRN  Assessment & Plan   Ransom Najjar was seen today for uri. Diagnoses and all orders for this visit:    Influenza A  -     oseltamivir (TAMIFLU) 75 MG capsule; Take 1 capsule by mouth 2 times daily for 5 days    Viral illness  -     POCT COVID-19, Antigen  -     POCT Influenza A/B    Orders Placed This Encounter   Procedures    POCT COVID-19, Antigen     Order Specific Question:   Is this test for diagnosis or screening? Answer:   Diagnosis of ill patient     Order Specific Question:   Symptomatic for COVID-19 as defined by CDC? Answer:   Yes     Order Specific Question:   Date of Symptom Onset     Answer:   12/20/2022     Order Specific Question:   Hospitalized for COVID-19? Answer:   No     Order Specific Question:   Admitted to ICU for COVID-19? Answer:   No     Order Specific Question:   Employed in healthcare setting? Answer:    No

## 2022-12-22 VITALS
SYSTOLIC BLOOD PRESSURE: 116 MMHG | TEMPERATURE: 98.3 F | HEART RATE: 105 BPM | HEIGHT: 63 IN | OXYGEN SATURATION: 97 % | BODY MASS INDEX: 26.58 KG/M2 | WEIGHT: 150 LBS | RESPIRATION RATE: 20 BRPM | DIASTOLIC BLOOD PRESSURE: 73 MMHG

## 2022-12-22 LAB
ALBUMIN SERPL-MCNC: 3.2 G/DL (ref 3.5–4.6)
ALP BLD-CCNC: 130 U/L (ref 40–130)
ALT SERPL-CCNC: 10 U/L (ref 0–33)
ANION GAP SERPL CALCULATED.3IONS-SCNC: 14 MEQ/L (ref 9–15)
ANISOCYTOSIS: ABNORMAL
AST SERPL-CCNC: 22 U/L (ref 0–35)
ATYPICAL LYMPHOCYTE RELATIVE PERCENT: 1 %
BACTERIA: ABNORMAL /HPF
BASOPHILS ABSOLUTE: 0 K/UL (ref 0–0.2)
BASOPHILS RELATIVE PERCENT: 0.5 %
BILIRUB SERPL-MCNC: <0.2 MG/DL (ref 0.2–0.7)
BILIRUBIN URINE: NEGATIVE
BLOOD, URINE: NEGATIVE
BUN BLDV-MCNC: 16 MG/DL (ref 6–20)
CALCIUM SERPL-MCNC: 8.6 MG/DL (ref 8.5–9.9)
CHLORIDE BLD-SCNC: 102 MEQ/L (ref 95–107)
CLARITY: CLEAR
CO2: 21 MEQ/L (ref 20–31)
COLOR: YELLOW
CREAT SERPL-MCNC: 0.78 MG/DL (ref 0.5–0.9)
EOSINOPHILS ABSOLUTE: 0 K/UL (ref 0–0.7)
EOSINOPHILS RELATIVE PERCENT: 0.2 %
EPITHELIAL CELLS, UA: ABNORMAL /HPF (ref 0–5)
GFR SERPL CREATININE-BSD FRML MDRD: >60 ML/MIN/{1.73_M2}
GLOBULIN: 2.6 G/DL (ref 2.3–3.5)
GLUCOSE BLD-MCNC: 86 MG/DL (ref 70–99)
GLUCOSE URINE: NEGATIVE MG/DL
HCT VFR BLD CALC: 25.7 % (ref 37–47)
HEMOGLOBIN: 8.2 G/DL (ref 12–16)
HYALINE CASTS: ABNORMAL /HPF (ref 0–5)
HYPOCHROMIA: ABNORMAL
KETONES, URINE: 15 MG/DL
LEUKOCYTE ESTERASE, URINE: ABNORMAL
LYMPHOCYTES ABSOLUTE: 0.3 K/UL (ref 1–4.8)
LYMPHOCYTES RELATIVE PERCENT: 6 %
MACROCYTES: ABNORMAL
MAGNESIUM: 1.9 MG/DL (ref 1.7–2.4)
MCH RBC QN AUTO: 27.5 PG (ref 27–31.3)
MCHC RBC AUTO-ENTMCNC: 31.9 % (ref 33–37)
MCV RBC AUTO: 86.3 FL (ref 79.4–94.8)
MICROCYTES: ABNORMAL
MONOCYTES ABSOLUTE: 0.1 K/UL (ref 0.2–0.8)
MONOCYTES RELATIVE PERCENT: 1.9 %
MYELOCYTE PERCENT: 1 %
NEUTROPHILS ABSOLUTE: 3.5 K/UL (ref 1.4–6.5)
NEUTROPHILS RELATIVE PERCENT: 90 %
NITRITE, URINE: NEGATIVE
NUCLEATED RED BLOOD CELLS: 1 /100 WBC
PDW BLD-RTO: 16.1 % (ref 11.5–14.5)
PH UA: 6 (ref 5–9)
PLATELET # BLD: 163 K/UL (ref 130–400)
PLATELET SLIDE REVIEW: NORMAL
POIKILOCYTES: ABNORMAL
POTASSIUM SERPL-SCNC: 4 MEQ/L (ref 3.4–4.9)
PROTEIN UA: NEGATIVE MG/DL
RBC # BLD: 2.98 M/UL (ref 4.2–5.4)
RBC UA: ABNORMAL /HPF (ref 0–5)
SODIUM BLD-SCNC: 137 MEQ/L (ref 135–144)
SPECIFIC GRAVITY UA: 1.01 (ref 1–1.03)
TOTAL PROTEIN: 5.8 G/DL (ref 6.3–8)
URINE REFLEX TO CULTURE: YES
UROBILINOGEN, URINE: 0.2 E.U./DL
WBC # BLD: 3.9 K/UL (ref 4.8–10.8)
WBC UA: ABNORMAL /HPF (ref 0–5)

## 2022-12-22 PROCEDURE — 6370000000 HC RX 637 (ALT 250 FOR IP): Performed by: PERSONAL EMERGENCY RESPONSE ATTENDANT

## 2022-12-22 RX ORDER — ONDANSETRON 4 MG/1
4 TABLET, FILM COATED ORAL EVERY 8 HOURS PRN
Qty: 20 TABLET | Refills: 0 | Status: SHIPPED | OUTPATIENT
Start: 2022-12-22

## 2022-12-22 RX ORDER — CEPHALEXIN 500 MG/1
1000 CAPSULE ORAL 2 TIMES DAILY
Qty: 28 CAPSULE | Refills: 0 | Status: SHIPPED | OUTPATIENT
Start: 2022-12-22 | End: 2022-12-29

## 2022-12-22 RX ORDER — CEPHALEXIN 500 MG/1
500 CAPSULE ORAL ONCE
Status: COMPLETED | OUTPATIENT
Start: 2022-12-22 | End: 2022-12-22

## 2022-12-22 RX ADMIN — CEPHALEXIN 500 MG: 500 CAPSULE ORAL at 01:11

## 2022-12-22 NOTE — ED PROVIDER NOTES
[address] [facility] ED  eMERGENCY dEPARTMENT eNCOUnter      Pt Name: Kat Abbott  MRN: [id number]  [place] 1995  Date of evaluation: 2022  Provider: JOSEE Thomas      HISTORY OF PRESENT ILLNESS    Kat Abbott is a 33 y.o. female with PMHx of anemia, Hodgkin's lymphoma, kidney stone presents to the emergency department with URI symptoms. Patient 32 weeks pregnant,  3, para 1 with twins. today patient started with fever 100.8, headaches, runny nose, congestion, dry cough. She went to walk-in clinic and diagnosed with flu A. They put her on Tamiflu and afterwards she started with severe nausea, no vomiting. She has had decreased oral intake, minimal urine output, and dysuria when she does urinate. She is having positive fetal movement. Tylenol last taken 8 PM.  She denies chest pain, shortness of breath, abdominal pain, vaginal bleeding. HPI    Nursing Notes were reviewed. REVIEW OF SYSTEMS       Review of Systems   Constitutional:  Positive for fever. Negative for appetite change and chills. HENT:  Positive for congestion and rhinorrhea. Negative for sore throat. Respiratory:  Positive for cough. Negative for shortness of breath. Cardiovascular:  Negative for chest pain. Gastrointestinal:  Positive for nausea. Negative for abdominal pain, blood in stool, diarrhea and vomiting. Genitourinary:  Positive for decreased urine volume and dysuria. Negative for difficulty urinating. Musculoskeletal:  Negative for neck stiffness. Skin:  Negative for color change and rash. Neurological:  Negative for dizziness, syncope, weakness, light-headedness, numbness and headaches. All other systems reviewed and are negative.           PAST MEDICAL HISTORY     Past Medical History:   Diagnosis Date    Cough variant asthma 2014    Hodgkin disease ([address]     Hodgkin's    Kidney stone          SURGICAL HISTORY       Past Surgical History:   Procedure Laterality Date BREAST FIBROADENOMA SURGERY  01/2013    DENTAL SURGERY  01/2022    all wisdom teeth removed     TYMPANOSTOMY TUBE PLACEMENT  1997    WISDOM TOOTH EXTRACTION  01/06/2022         CURRENT MEDICATIONS       Previous Medications    DOCUSATE SODIUM (COLACE) 100 MG CAPSULE    Take 1 capsule by mouth 2 times daily    FERROUS SULFATE (IRON 325) 325 (65 FE) MG TABLET    Take 1 tablet by mouth 2 times daily    LORATADINE (CLARITIN PO)    Take by mouth    OSELTAMIVIR (TAMIFLU) 75 MG CAPSULE    Take 1 capsule by mouth 2 times daily for 5 days    PEDIATRIC MULTIVITAMINS-IRON (POLY-VITAMIN/IRON) 10 MG/ML SOLN    Take by mouth    PRENATAL VIT-FE FUMARATE-FA (PRENATAL PO)    Take by mouth       ALLERGIES     Penicillins    FAMILY HISTORY       Family History   Problem Relation Age of Onset    High Blood Pressure Mother     High Blood Pressure Father     High Blood Pressure Maternal Aunt     High Blood Pressure Maternal Grandmother     High Blood Pressure Maternal Grandfather     Diabetes Other         mom's side    Cancer Neg Hx     Breast Cancer Neg Hx     Prostate Cancer Neg Hx     Depression Neg Hx           SOCIAL HISTORY       Social History     Socioeconomic History    Marital status:      Spouse name: None    Number of children: None    Years of education: None    Highest education level: None   Tobacco Use    Smoking status: Never    Smokeless tobacco: Never   Vaping Use    Vaping Use: Never used   Substance and Sexual Activity    Alcohol use: Not Currently     Comment: rare    Drug use: No    Sexual activity: Yes     Partners: Male     Social Determinants of Health     Financial Resource Strain: Low Risk     Difficulty of Paying Living Expenses: Not hard at all   Food Insecurity: No Food Insecurity    Worried About Running Out of Food in the Last Year: Never true    Ran Out of Food in the Last Year: Never true         PHYSICAL EXAM         ED Triage Vitals [12/21/22 2133]   BP Temp Temp Source Heart Rate Resp SpO2 Height Weight   (!) 95/56 98.3 °F (36.8 °C) Oral (!) 122 20 96 % 5' 3\" (1.6 m) 150 lb (68 kg)       Physical Exam  Constitutional:       Appearance: She is well-developed. HENT:      Head: Normocephalic and atraumatic. Right Ear: Tympanic membrane normal.      Left Ear: Tympanic membrane normal.      Mouth/Throat:      Pharynx: No oropharyngeal exudate or posterior oropharyngeal erythema. Eyes:      Conjunctiva/sclera: Conjunctivae normal.      Pupils: Pupils are equal, round, and reactive to light. Neck:      Trachea: No tracheal deviation. Cardiovascular:      Heart sounds: Normal heart sounds. Pulmonary:      Effort: Pulmonary effort is normal. No respiratory distress. Breath sounds: Normal breath sounds. No stridor. Abdominal:      General: Bowel sounds are normal. There is no distension. Palpations: Abdomen is soft. There is no mass. Tenderness: There is no abdominal tenderness. There is no guarding or rebound. Musculoskeletal:         General: Normal range of motion. Cervical back: Normal range of motion and neck supple. Skin:     General: Skin is warm and dry. Capillary Refill: Capillary refill takes less than 2 seconds. Findings: No rash. Neurological:      Mental Status: She is alert and oriented to person, place, and time. Deep Tendon Reflexes: Reflexes are normal and symmetric. Psychiatric:         Behavior: Behavior normal.         Thought Content:  Thought content normal.         Judgment: Judgment normal.       DIAGNOSTIC RESULTS     EKG:All EKG's are interpreted by the Emergency Department Physician who either signs or Co-signs this chart in the absence of a cardiologist.        RADIOLOGY:   Non-plain film images such as CT, Ultrasound and MRI are read by theradiologist. Plain radiographic images are visualized and preliminarily interpreted by the emergency physician with the below findings:    Interpretation per theRadiologist below, if available at the time of this note:    No orders to display           LABS:  [address] - Abnormal; Notable for the following components:       Result Value    Ketones, Urine 15 (*)     Leukocyte Esterase, Urine MODERATE (*)     All other components within normal limits   CBC WITH AUTO DIFFERENTIAL - Abnormal; Notable for the following components:    WBC 3.9 (*)     RBC 2.98 (*)     Hemoglobin 8.2 (*)     Hematocrit 25.7 (*)     MCHC 31.9 (*)     RDW 16.1 (*)     All other components within normal limits   COMPREHENSIVE METABOLIC PANEL - Abnormal; Notable for the following components: Total Protein 5.8 (*)     Albumin 3.2 (*)     All other components within normal limits   MICROSCOPIC URINALYSIS - Abnormal; Notable for the following components:    Bacteria, UA MODERATE (*)     WBC, UA 20-50 (*)     RBC, UA 3-5 (*)     All other components within normal limits   CULTURE, URINE   MAGNESIUM       All other labs were within normal range or not returned as of this dictation. EMERGENCY DEPARTMENT COURSE and DIFFERENTIAL DIAGNOSIS/MDM:   Vitals:    Vitals:    12/21/22 2133 12/22/22 0000 12/22/22 0007   BP: (!) 95/56 111/81    Pulse: (!) 122  (!) 107   Resp: 20     Temp: 98.3 °F (36.8 °C)     TempSrc: Oral     SpO2: 96% 99%    Weight: 150 lb (68 kg)     Height: 5' 3\" (1.6 m)           MDM    Blood work unremarkable. Urine does show urinary tract infection with moderate leukocytes, 20-50 WBCs, and moderate bacteria. Patient given a liter IV fluid, Zofran, and started on Keflex. On reassessment symptoms have improved. She will be sent home with Keflex and Zofran and aware to keep her self hydrated and to take Tylenol every 4-6 hours. Standard anticipatory guidance given to patient upon discharge. Have given them a specific time frame in which to follow-up and who to follow-up with.  I have also advised them that they should return to the emergency department if they get worse, or not getting better or develop any new or concerning symptoms. Patient demonstrates understanding. CRITICAL CARE TIME   Total Critical Caretime was 0 minutes, excluding separately reportable procedures. There was a high probability of clinically significant/life threatening deterioration in the patient's condition which required my urgent intervention. Procedures    FINAL IMPRESSION      1. Influenza A    2. Acute cystitis without hematuria          DISPOSITION/PLAN   DISPOSITION Discharge - Pending Orders Complete 12/22/2022 12:56:18 AM      PATIENT REFERRED TO:  ADRIAN Tomas - [place] [address], [address]  [place] [address]  [phone]          DISCHARGE MEDICATIONS:  New Prescriptions    CEPHALEXIN (KEFLEX) 500 MG CAPSULE    Take 2 capsules by mouth 2 times daily for 7 days    ONDANSETRON (ZOFRAN) 4 MG TABLET    Take 1 tablet by mouth every 8 hours as needed for Nausea          (Please notethat portions of this note were completed with a voice recognition program.  Efforts were made to edit the dictations but occasionally words are mis-transcribed. )    JSOEE Cano (electronically signed)  Emergency Physician Assistant         Amaya Cooley  12/22/22 0110

## 2022-12-22 NOTE — ED TRIAGE NOTES
32 week pregnant patient comes to the ER for complaints of flu like symptoms with nausea and vomiting x 2 todays. Tested positive for Flu today and is concerned for dehydration.

## 2022-12-23 LAB — URINE CULTURE, ROUTINE: NORMAL

## 2022-12-27 PROBLEM — O30.033 MONOCHORIONIC DIAMNIOTIC TWIN PREGNANCY IN THIRD TRIMESTER: Status: ACTIVE | Noted: 2022-08-31

## 2022-12-30 ENCOUNTER — ROUTINE PRENATAL (OUTPATIENT)
Dept: OBGYN CLINIC | Age: 27
End: 2022-12-30

## 2022-12-30 VITALS
HEART RATE: 86 BPM | WEIGHT: 153 LBS | SYSTOLIC BLOOD PRESSURE: 118 MMHG | DIASTOLIC BLOOD PRESSURE: 74 MMHG | BODY MASS INDEX: 27.1 KG/M2

## 2022-12-30 DIAGNOSIS — O99.019 ANEMIA AFFECTING THIRD PREGNANCY: ICD-10-CM

## 2022-12-30 RX ORDER — SODIUM CHLORIDE 9 MG/ML
INJECTION, SOLUTION INTRAVENOUS CONTINUOUS
OUTPATIENT
Start: 2022-12-30

## 2022-12-30 RX ORDER — ALBUTEROL SULFATE 90 UG/1
4 AEROSOL, METERED RESPIRATORY (INHALATION) PRN
OUTPATIENT
Start: 2022-12-30

## 2022-12-30 RX ORDER — ACETAMINOPHEN 325 MG/1
650 TABLET ORAL
OUTPATIENT
Start: 2022-12-30

## 2022-12-30 RX ORDER — ONDANSETRON 2 MG/ML
8 INJECTION INTRAMUSCULAR; INTRAVENOUS
OUTPATIENT
Start: 2022-12-30

## 2022-12-30 RX ORDER — DIPHENHYDRAMINE HYDROCHLORIDE 50 MG/ML
50 INJECTION INTRAMUSCULAR; INTRAVENOUS
OUTPATIENT
Start: 2022-12-30

## 2022-12-30 RX ORDER — FAMOTIDINE 10 MG/ML
20 INJECTION, SOLUTION INTRAVENOUS
OUTPATIENT
Start: 2022-12-30

## 2022-12-30 RX ORDER — LIDOCAINE HYDROCHLORIDE 10 MG/ML
0.25 INJECTION, SOLUTION EPIDURAL; INFILTRATION; INTRACAUDAL; PERINEURAL
OUTPATIENT
Start: 2022-12-30

## 2022-12-30 RX ORDER — EPINEPHRINE 1 MG/ML
0.3 INJECTION, SOLUTION, CONCENTRATE INTRAVENOUS PRN
OUTPATIENT
Start: 2022-12-30

## 2022-12-30 RX ORDER — MEPERIDINE HYDROCHLORIDE 50 MG/ML
12.5 INJECTION INTRAMUSCULAR; INTRAVENOUS; SUBCUTANEOUS PRN
OUTPATIENT
Start: 2022-12-30

## 2022-12-30 ASSESSMENT — ENCOUNTER SYMPTOMS
ABDOMINAL PAIN: 0
SHORTNESS OF BREATH: 0
NAUSEA: 0
CONSTIPATION: 0
DIARRHEA: 0
VOMITING: 0

## 2022-12-30 NOTE — PROGRESS NOTES
SUBJECTIVE:  Denies bleeding, spotting, leaking of fluid, abnormal discharge. Good fetal movement. Unable to tolerate iron supplements. They cause GI upset, N/V, and loose stools. Discussed scheduling an iron infusion. Experiencing swelling of lower extremities. Discussed using support socks/compression socks to relieve discomfort. Review of Systems   Eyes:  Negative for visual disturbance. Respiratory:  Negative for shortness of breath. Gastrointestinal:  Negative for abdominal pain, constipation, diarrhea, nausea and vomiting. Genitourinary:  Negative for dysuria, vaginal bleeding and vaginal discharge. Neurological:  Negative for headaches. OBJECTIVE:  Physical Exam  Appearance:  Normal appearance  Cardiovascular:  Normal rate, Capillary refill less than 2 seconds  Pulmonary:  Normal effort, no distress  Abdominal:  No tenderness  MS:  No Swelling, No dependent edema  Skin:  Warm, dry  Neuro:  Alert and oriented x3, reflexes normal.  Psychiatric:  Normal mood and behavior    ASSESSMENT:  33 y.o. female  IUP at 34w0d    Patient Active Problem List    Diagnosis Date Noted    Anemia affecting third pregnancy 2022     Priority: Medium    Dehydration during pregnancy 2022     Priority: Medium    Monochorionic diamniotic twin pregnancy in third trimester 2022     Priority: Medium    Single episode of elevated blood pressure     Thrombocytopenia affecting pregnancy ([address]     Hodgkin's lymphoma ([facility] [address] in remission 2014    Anemia 2013      Diagnosis Orders   1. Anemia affecting third pregnancy            PLAN:  Anemia - schedule iron infusion  Keep MFM appointment next week     Follow-Up  Return in 1 week (on 2023) for Prenatal Care Visit.     ADRIAN Key CNM

## 2022-12-30 NOTE — PROGRESS NOTES
Issues with severe swelling, pt is taking iron pills but they are making her very nauseous. Would like to get urine sent for UTI as well.

## 2023-01-05 ENCOUNTER — TELEPHONE (OUTPATIENT)
Dept: PRIMARY CARE CLINIC | Age: 28
End: 2023-01-05

## 2023-01-09 ENCOUNTER — HOSPITAL ENCOUNTER (INPATIENT)
Age: 28
LOS: 5 days | Discharge: HOME | DRG: 539 | End: 2023-01-14
Attending: OBSTETRICS & GYNECOLOGY | Admitting: OBSTETRICS & GYNECOLOGY
Payer: MEDICAID

## 2023-01-09 ENCOUNTER — ROUTINE PRENATAL (OUTPATIENT)
Dept: OBGYN CLINIC | Age: 28
End: 2023-01-09
Payer: MEDICAID

## 2023-01-09 VITALS
BODY MASS INDEX: 27.63 KG/M2 | SYSTOLIC BLOOD PRESSURE: 126 MMHG | HEART RATE: 98 BPM | DIASTOLIC BLOOD PRESSURE: 72 MMHG | WEIGHT: 156 LBS

## 2023-01-09 DIAGNOSIS — O30.033 MONOCHORIONIC DIAMNIOTIC TWIN GESTATION IN THIRD TRIMESTER: ICD-10-CM

## 2023-01-09 DIAGNOSIS — O99.019 ANEMIA AFFECTING THIRD PREGNANCY: Primary | ICD-10-CM

## 2023-01-09 PROBLEM — Z78.9 ADMITTED TO LABOR AND DELIVERY: Status: ACTIVE | Noted: 2023-01-09

## 2023-01-09 LAB
ABO/RH: NORMAL
ALBUMIN SERPL-MCNC: 3 G/DL (ref 3.5–4.6)
ALP BLD-CCNC: 146 U/L (ref 40–130)
ALT SERPL-CCNC: 6 U/L (ref 0–33)
AMPHETAMINE SCREEN, URINE: NORMAL
ANION GAP SERPL CALCULATED.3IONS-SCNC: 12 MEQ/L (ref 9–15)
ANTIBODY SCREEN: NORMAL
AST SERPL-CCNC: 18 U/L (ref 0–35)
BARBITURATE SCREEN URINE: NORMAL
BASOPHILS ABSOLUTE: 0 K/UL (ref 0–0.2)
BASOPHILS RELATIVE PERCENT: 0.6 %
BENZODIAZEPINE SCREEN, URINE: NORMAL
BILIRUB SERPL-MCNC: <0.2 MG/DL (ref 0.2–0.7)
BILIRUBIN URINE: NEGATIVE
BLOOD, URINE: ABNORMAL
BUN BLDV-MCNC: 21 MG/DL (ref 6–20)
CALCIUM SERPL-MCNC: 8.6 MG/DL (ref 8.5–9.9)
CANNABINOID SCREEN URINE: NORMAL
CHLORIDE BLD-SCNC: 104 MEQ/L (ref 95–107)
CLARITY: CLEAR
CO2: 19 MEQ/L (ref 20–31)
COCAINE METABOLITE SCREEN URINE: NORMAL
COLOR: YELLOW
CREAT SERPL-MCNC: 0.71 MG/DL (ref 0.5–0.9)
CREATININE URINE: 137.6 MG/DL
EOSINOPHILS ABSOLUTE: 0 K/UL (ref 0–0.7)
EOSINOPHILS RELATIVE PERCENT: 0.3 %
FENTANYL SCREEN, URINE: NORMAL
GFR SERPL CREATININE-BSD FRML MDRD: >60 ML/MIN/{1.73_M2}
GLOBULIN: 2.7 G/DL (ref 2.3–3.5)
GLUCOSE BLD-MCNC: 77 MG/DL (ref 70–99)
GLUCOSE URINE: NEGATIVE MG/DL
HCT VFR BLD CALC: 28.8 % (ref 37–47)
HEMOGLOBIN: 9.4 G/DL (ref 12–16)
KETONES, URINE: NEGATIVE MG/DL
LEUKOCYTE ESTERASE, URINE: ABNORMAL
LYMPHOCYTES ABSOLUTE: 1.7 K/UL (ref 1–4.8)
LYMPHOCYTES RELATIVE PERCENT: 40.1 %
Lab: NORMAL
MCH RBC QN AUTO: 28 PG (ref 27–31.3)
MCHC RBC AUTO-ENTMCNC: 32.8 % (ref 33–37)
MCV RBC AUTO: 85.3 FL (ref 79.4–94.8)
METHADONE SCREEN, URINE: NORMAL
MONOCYTES ABSOLUTE: 0.3 K/UL (ref 0.2–0.8)
MONOCYTES RELATIVE PERCENT: 6.3 %
NEUTROPHILS ABSOLUTE: 2.3 K/UL (ref 1.4–6.5)
NEUTROPHILS RELATIVE PERCENT: 52.7 %
NITRITE, URINE: NEGATIVE
OPIATE SCREEN URINE: NORMAL
OXYCODONE URINE: NORMAL
PDW BLD-RTO: 18 % (ref 11.5–14.5)
PH UA: 6 (ref 5–9)
PHENCYCLIDINE SCREEN URINE: NORMAL
PLATELET # BLD: 205 K/UL (ref 130–400)
POTASSIUM SERPL-SCNC: 4.1 MEQ/L (ref 3.4–4.9)
PROPOXYPHENE SCREEN: NORMAL
PROTEIN PROTEIN: 120 MG/DL
PROTEIN UA: 100 MG/DL
PROTEIN/CREAT RATIO: 0.9 ML/ML
PROTEIN/CREAT RATIO: 0.9 ML/ML (ref 0–0.2)
RBC # BLD: 3.37 M/UL (ref 4.2–5.4)
SARS-COV-2, NAAT: NOT DETECTED
SODIUM BLD-SCNC: 135 MEQ/L (ref 135–144)
SPECIFIC GRAVITY UA: 1.02 (ref 1–1.03)
TOTAL PROTEIN: 5.7 G/DL (ref 6.3–8)
URIC ACID, SERUM: 6.6 MG/DL (ref 2.4–5.7)
URINE REFLEX TO CULTURE: ABNORMAL
UROBILINOGEN, URINE: 0.2 E.U./DL
WBC # BLD: 4.4 K/UL (ref 4.8–10.8)
WBC UA: NORMAL /HPF (ref 0–5)

## 2023-01-09 PROCEDURE — 84550 ASSAY OF BLOOD/URIC ACID: CPT

## 2023-01-09 PROCEDURE — 83615 LACTATE (LD) (LDH) ENZYME: CPT

## 2023-01-09 PROCEDURE — 80307 DRUG TEST PRSMV CHEM ANLYZR: CPT

## 2023-01-09 PROCEDURE — 85025 COMPLETE CBC W/AUTO DIFF WBC: CPT

## 2023-01-09 PROCEDURE — 83625 ASSAY OF LDH ENZYMES: CPT

## 2023-01-09 PROCEDURE — 2580000003 HC RX 258: Performed by: OBSTETRICS & GYNECOLOGY

## 2023-01-09 PROCEDURE — 87081 CULTURE SCREEN ONLY: CPT

## 2023-01-09 PROCEDURE — 6360000002 HC RX W HCPCS: Performed by: OBSTETRICS & GYNECOLOGY

## 2023-01-09 PROCEDURE — 86901 BLOOD TYPING SEROLOGIC RH(D): CPT

## 2023-01-09 PROCEDURE — 86900 BLOOD TYPING SEROLOGIC ABO: CPT

## 2023-01-09 PROCEDURE — G8419 CALC BMI OUT NRM PARAM NOF/U: HCPCS | Performed by: OBSTETRICS & GYNECOLOGY

## 2023-01-09 PROCEDURE — 81001 URINALYSIS AUTO W/SCOPE: CPT

## 2023-01-09 PROCEDURE — 86850 RBC ANTIBODY SCREEN: CPT

## 2023-01-09 PROCEDURE — G8484 FLU IMMUNIZE NO ADMIN: HCPCS | Performed by: OBSTETRICS & GYNECOLOGY

## 2023-01-09 PROCEDURE — G8427 DOCREV CUR MEDS BY ELIG CLIN: HCPCS | Performed by: OBSTETRICS & GYNECOLOGY

## 2023-01-09 PROCEDURE — 99213 OFFICE O/P EST LOW 20 MIN: CPT | Performed by: OBSTETRICS & GYNECOLOGY

## 2023-01-09 PROCEDURE — 1036F TOBACCO NON-USER: CPT | Performed by: OBSTETRICS & GYNECOLOGY

## 2023-01-09 PROCEDURE — 1220000000 HC SEMI PRIVATE OB R&B

## 2023-01-09 PROCEDURE — 87635 SARS-COV-2 COVID-19 AMP PRB: CPT

## 2023-01-09 PROCEDURE — 80053 COMPREHEN METABOLIC PANEL: CPT

## 2023-01-09 PROCEDURE — 84156 ASSAY OF PROTEIN URINE: CPT

## 2023-01-09 RX ORDER — SODIUM CHLORIDE 9 MG/ML
5-250 INJECTION, SOLUTION INTRAVENOUS PRN
OUTPATIENT
Start: 2023-01-09

## 2023-01-09 RX ORDER — HEPARIN SODIUM (PORCINE) LOCK FLUSH IV SOLN 100 UNIT/ML 100 UNIT/ML
500 SOLUTION INTRAVENOUS PRN
OUTPATIENT
Start: 2023-01-09

## 2023-01-09 RX ORDER — ACETAMINOPHEN 325 MG/1
650 TABLET ORAL
OUTPATIENT
Start: 2023-01-09

## 2023-01-09 RX ORDER — BETAMETHASONE SODIUM PHOSPHATE AND BETAMETHASONE ACETATE 3; 3 MG/ML; MG/ML
12 INJECTION, SUSPENSION INTRA-ARTICULAR; INTRALESIONAL; INTRAMUSCULAR; SOFT TISSUE DAILY
Status: COMPLETED | OUTPATIENT
Start: 2023-01-09 | End: 2023-01-10

## 2023-01-09 RX ORDER — EPINEPHRINE 1 MG/ML
0.3 INJECTION, SOLUTION, CONCENTRATE INTRAVENOUS PRN
OUTPATIENT
Start: 2023-01-09

## 2023-01-09 RX ORDER — SODIUM CHLORIDE 9 MG/ML
INJECTION, SOLUTION INTRAVENOUS CONTINUOUS
OUTPATIENT
Start: 2023-01-09

## 2023-01-09 RX ORDER — SODIUM CHLORIDE, SODIUM LACTATE, POTASSIUM CHLORIDE, AND CALCIUM CHLORIDE .6; .31; .03; .02 G/100ML; G/100ML; G/100ML; G/100ML
500 INJECTION, SOLUTION INTRAVENOUS ONCE
Status: COMPLETED | OUTPATIENT
Start: 2023-01-09 | End: 2023-01-09

## 2023-01-09 RX ORDER — FAMOTIDINE 10 MG/ML
20 INJECTION, SOLUTION INTRAVENOUS
OUTPATIENT
Start: 2023-01-09

## 2023-01-09 RX ORDER — DIPHENHYDRAMINE HYDROCHLORIDE 50 MG/ML
50 INJECTION INTRAMUSCULAR; INTRAVENOUS
OUTPATIENT
Start: 2023-01-09

## 2023-01-09 RX ORDER — NIFEDIPINE 30 MG/1
30 TABLET, EXTENDED RELEASE ORAL DAILY
Status: DISCONTINUED | OUTPATIENT
Start: 2023-01-09 | End: 2023-01-11

## 2023-01-09 RX ORDER — BETAMETHASONE SODIUM PHOSPHATE AND BETAMETHASONE ACETATE 3; 3 MG/ML; MG/ML
12 INJECTION, SUSPENSION INTRA-ARTICULAR; INTRALESIONAL; INTRAMUSCULAR; SOFT TISSUE EVERY 24 HOURS
Status: DISCONTINUED | OUTPATIENT
Start: 2023-01-10 | End: 2023-01-09 | Stop reason: SDUPTHER

## 2023-01-09 RX ORDER — SODIUM CHLORIDE 0.9 % (FLUSH) 0.9 %
5-40 SYRINGE (ML) INJECTION PRN
OUTPATIENT
Start: 2023-01-09

## 2023-01-09 RX ORDER — ONDANSETRON 2 MG/ML
8 INJECTION INTRAMUSCULAR; INTRAVENOUS
OUTPATIENT
Start: 2023-01-09

## 2023-01-09 RX ORDER — SODIUM CHLORIDE, SODIUM LACTATE, POTASSIUM CHLORIDE, CALCIUM CHLORIDE 600; 310; 30; 20 MG/100ML; MG/100ML; MG/100ML; MG/100ML
INJECTION, SOLUTION INTRAVENOUS
Status: DISPENSED
Start: 2023-01-09 | End: 2023-01-10

## 2023-01-09 RX ORDER — ALBUTEROL SULFATE 90 UG/1
4 AEROSOL, METERED RESPIRATORY (INHALATION) PRN
OUTPATIENT
Start: 2023-01-09

## 2023-01-09 RX ADMIN — BETAMETHASONE SODIUM PHOSPHATE AND BETAMETHASONE ACETATE 12 MG: 3; 3 INJECTION, SUSPENSION INTRA-ARTICULAR; INTRALESIONAL; INTRAMUSCULAR at 12:30

## 2023-01-09 RX ADMIN — SODIUM CHLORIDE, POTASSIUM CHLORIDE, SODIUM LACTATE AND CALCIUM CHLORIDE 500 ML: 600; 310; 30; 20 INJECTION, SOLUTION INTRAVENOUS at 22:52

## 2023-01-09 NOTE — TELEPHONE ENCOUNTER
Per the transfusion center, there is a shortage of NSAID so they can not schedule the pt. Please advise if order can be changed.

## 2023-01-09 NOTE — PROGRESS NOTES
Patient's last menstrual period was 05/06/2022.   Please reference prenatal and OB flow chart for further information  PT here today for routine prenatal care  Pt endorses fetal movement and denies loss of fluid, contractions or vaginal bleeding  Pt without complaints  ROS:  Pt denies headache, dysuria, nausea/vomiting  PE:  /72   Pulse 98   Wt 156 lb (70.8 kg)   LMP 05/06/2022   BMI 27.63 kg/m²   Gen - Alert and oriented x 3  HEENT- NC/AT, CVS - RRR, Lungs - CTAB  Abd - FH difficult to assess due to twin gestation  Appropriate fetal growth  Proteinuria on exam today - pt sent to L&D for further evaluation   Venofur transfusion therapy plan ordered

## 2023-01-09 NOTE — FLOWSHEET NOTE
Call placed to Dr Дмитрий Reynolds, pediatrician to inquire for Dr Serena Cazares how long after steroids given to wait before delivery. Dr Дмитрий Reynolds states as long as possible, if stable.

## 2023-01-09 NOTE — H&P
Department of Obstetrics and Gynecology   History & Physical    Pt Name: Kaushik Boles  MRN: [id number] [place] #: [[id number]]  YOB: 1995  Estimated Date of Delivery: 2/10/23      HPI: The patient is a 33 y.o.  35w3d female who is referred to L&D for proteinuria in office. Pt with twin gestation and new onset proteinuria with significant bilateral edema. Pt for prolonged monitoring, ANS and plan for delivery at completion of ANS.   +FM, -VB, -LOF, -CTX    Allergies: Allergies as of 2022 - Fully Reviewed 2022   Allergen Reaction Noted    Penicillins Hives 2012       Medications:    Current Facility-Administered Medications:     betamethasone acetate-betamethasone sodium phosphate (CELESTONE) injection 12 mg, 12 mg, IntraMUSCular, Daily, Kerry Dent MD, 12 mg at 23 1230    OB History:     Gyn History: Denies h/o abnormal pap smear, h/o STDs. Past Medical History:   Past Medical History:   Diagnosis Date    Cough variant asthma 2014    Hodgkin disease ([facility] [address]     Hodgkin's    Kidney stone        Past Surgical History:   Past Surgical History:   Procedure Laterality Date    BREAST FIBROADENOMA SURGERY  2013    DENTAL SURGERY  2022    all wisdom teeth removed     TYMPANOSTOMY TUBE PLACEMENT      WISDOM TOOTH EXTRACTION  2022       Social History:   Social History     Tobacco Use   Smoking Status Never   Smokeless Tobacco Never        Family History: Noncontributory; Denies h/o cancer. ROS:  Negative except as stated in HPI, denies nausea, vomiting, fever, chills, headache or dysuria. PE:  There were no vitals filed for this visit.     General: well nourished, well developed, in no acute distress  CV: Normal heart sounds  Resp: breathing unlabored  Abdomen: Nontender, no rebound, no guarding  FH: difficult to assess due to twin gestation   Cx: deferred    NST - Cat 1: FHR 140s, moderate variability, +accels, -decels    Labs: Blood Type/Rh: A POS    Group B Strep:  pending    Assessment:   33 y.o. [id number] 35w3d female with proteinuria, r/o PIH, mono/di twin gestation     Plan:   Admit for ANS and delivery    Lorne De Leon MD

## 2023-01-09 NOTE — FLOWSHEET NOTE
Dr Lei Tobin called in, blood pressures reported of 120-130/90s. PCR elevated, orders received to admit for OBS, steroids and Qshift NST.

## 2023-01-09 NOTE — FLOWSHEET NOTE
Patient sent by Dr Cal Rice for monitoring, blood pressure workup, preeclampsia labs and PC Ratio. Patient pregnant with mono di twins. Urine collected and sent to lab.

## 2023-01-10 ENCOUNTER — ANESTHESIA EVENT (OUTPATIENT)
Dept: LABOR AND DELIVERY | Age: 28
DRG: 539 | End: 2023-01-10
Payer: MEDICAID

## 2023-01-10 PROCEDURE — 99232 SBSQ HOSP IP/OBS MODERATE 35: CPT | Performed by: OBSTETRICS & GYNECOLOGY

## 2023-01-10 PROCEDURE — 59025 FETAL NON-STRESS TEST: CPT | Performed by: OBSTETRICS & GYNECOLOGY

## 2023-01-10 PROCEDURE — 6360000002 HC RX W HCPCS: Performed by: OBSTETRICS & GYNECOLOGY

## 2023-01-10 PROCEDURE — 1220000000 HC SEMI PRIVATE OB R&B

## 2023-01-10 PROCEDURE — 2580000003 HC RX 258: Performed by: OBSTETRICS & GYNECOLOGY

## 2023-01-10 RX ORDER — SCOLOPAMINE TRANSDERMAL SYSTEM 1 MG/1
1 PATCH, EXTENDED RELEASE TRANSDERMAL
Status: DISCONTINUED | OUTPATIENT
Start: 2023-01-11 | End: 2023-01-11

## 2023-01-10 RX ORDER — CLINDAMYCIN PHOSPHATE 900 MG/50ML
900 INJECTION INTRAVENOUS EVERY 8 HOURS
Status: DISCONTINUED | OUTPATIENT
Start: 2023-01-11 | End: 2023-01-11

## 2023-01-10 RX ADMIN — BETAMETHASONE SODIUM PHOSPHATE AND BETAMETHASONE ACETATE 12 MG: 3; 3 INJECTION, SUSPENSION INTRA-ARTICULAR; INTRALESIONAL; INTRAMUSCULAR at 12:44

## 2023-01-10 RX ADMIN — IRON SUCROSE 500 MG: 20 INJECTION, SOLUTION INTRAVENOUS at 10:10

## 2023-01-10 NOTE — FLOWSHEET NOTE
Called Dr. Jocelin Vallejo to verify if she wanted to add parameters to the Procardia XL order. Patient's last two BP wnl. Per Dr. Jocelin Vallejo hold Procardia if bp is less than 140/90.  Medication help at this time due to bp of 118/72

## 2023-01-10 NOTE — PROCEDURES
Labor and Delivery OB Triage                                                  Nonstress Testing Note     S:  Patient is a 34 yo  female @ 35.4 weeks, EDC 2/10/2023 with Monochorionic Diamniotic Twin Pregnancy             O:       NST: Category 1     FHR:    Baby A 140s R,               Baby B 130s R   Long Term Variablity: +Accels, No Decels, Moderate Variability. [place]: Irregular contractions           A: [place]-Di Twin IUP @ 35.4 weeks-Reactive NST. P: Continue NST q shift.

## 2023-01-10 NOTE — PROGRESS NOTES
Department of Obstetrics and Gynecology  Labor and Delivery   Progress Note      SUBJECTIVE:  Patient is doing well. Reports +FM x 2. Denies any concerns overnight. Lower pedal swelling has gone down. She denies HA, blurry vision, CP, and SOB. OBJECTIVE:      Vitals:    /73   Pulse 75   Temp 98.3 °F (36.8 °C) (Oral)   Resp 16   LMP 05/06/2022   SpO2 96%       Fetal heart rate: Category I       Baseline Heart Rate:  140s R  , 130s R       Accelerations:  present       Long Term Variability:  moderate       Decelerations:  absent         Contraction frequency: q8-10 minutes, irregular    Membranes:  Intact    DATA:  Results     Component Value Units   COVID-19, Rapid [[id number]]    Collected: 01/09/23 1249    Updated: 01/09/23 1359    Specimen Type: Nasal    Specimen Source: Nasopharyngeal Swab     SARS-CoV-2, NAAT Not Detected    Comment: Rapid NAAT:   Negative results should be treated as presumptive and,   if inconsistent with clinical signs and symptoms or necessary for   patient management, should be tested with an alternative molecular   assay. Negative results do not preclude SARS-CoV-2 infection and   should not be used as the sole basis for patient management decisions. This test has been authorized by the FDA under an Emergency Use   Authorization (EUA) for use by authorized laboratories.      Fact sheet for Healthcare Providers:   http://www.shira.debbie/   Fact sheet for Patients: http://www.catherine-ritika.debbie/     METHODOLOGY: Isothermal Nucleic Acid Amplification       TYPE AND SCREEN [[id number]]    Collected: 01/09/23 1207    Updated: 01/09/23 1343    Specimen Type: Blood     ABO/Rh A POS    Antibody Screen NEG   Urinalysis with Reflex to Culture [[id number]] (Abnormal)    Collected: 01/09/23 1106    Updated: 01/09/23 1332    Specimen Source: Urine, clean catch     Color, UA Yellow    Clarity, UA Clear    Glucose, Ur Negative mg/dL    Bilirubin Urine Negative Ketones, Urine Negative mg/dL    Specific Gravity, UA 1.019    Blood, Urine SMALL Abnormal     pH, UA 6.0    Protein,  Abnormal  mg/dL    Urobilinogen, Urine 0.2 E.U./dL    Nitrite, Urine Negative    Leukocyte Esterase, Urine SMALL Abnormal     Urine Reflex to Culture Not Indicated   Microscopic Urinalysis [[id number]]    Collected: 01/09/23 1106    Updated: 01/09/23 1332     WBC, UA 0-2 /HPF   Culture, Strep B Screen, Vaginal/Rectal [[id number]]    Collected: 01/09/23 1248    Updated: 01/09/23 1258    Specimen Type: Genital    Specimen Source: Vaginal    Comprehensive Metabolic Panel [[id number]] (Abnormal)    Collected: 01/09/23 1208    Updated: 01/09/23 1247    Specimen Source: Blood     Sodium 135 mEq/L    Potassium 4.1 mEq/L    Chloride 104 mEq/L    CO2 19 Low  mEq/L    Anion Gap 12 mEq/L    Glucose 77 mg/dL    BUN 21 High  mg/dL    Creatinine 0.71 mg/dL    Est, Glom Filt Rate >60.0    Comment: Pediatric calculator link   [facility]at. org/professionals/kdoqi/gfr_calculatorped   Effective Oct 3, 2022   These results are not intended for use in patients   <25years of age. eGFR results are calculated without   a race factor using the 2021 CKD-EPI equation. Careful   clinical correlation is recommended, particularly when   comparing to results calculated using previous equations. The CKD-EPI equation is less accurate in patients with   extremes of muscle mass, extra-renal metabolism of   creatinine, excessive creatinine ingestion, or following   therapy that affects renal tubular secretion.         Calcium 8.6 mg/dL    Total Protein 5.7 Low  g/dL    Albumin 3.0 Low  g/dL    Total Bilirubin <0.2 mg/dL    Alkaline Phosphatase 146 High  U/L    ALT 6 U/L    AST 18 U/L    Globulin 2.7 g/dL   Uric Acid [[id number]] (Abnormal)    Collected: 01/09/23 1208    Updated: 01/09/23 1247    Specimen Source: Blood     Uric Acid, Serum 6.6 High  mg/dL   CBC with Auto Differential [[id number]] (Abnormal) Collected: 01/09/23 1208    Updated: 01/09/23 1231    Specimen Source: Blood     WBC 4.4 Low  K/uL    RBC 3.37 Low  M/uL    Hemoglobin 9.4 Low  g/dL    Hematocrit 28.8 Low  %    MCV 85.3 fL    MCH 28.0 pg    MCHC 32.8 Low  %    RDW 18.0 High  %    Platelets 105 K/uL    Neutrophils % 52.7 %    Lymphocytes % 40.1 %    Monocytes % 6.3 %    Eosinophils % 0.3 %    Basophils % 0.6 %    Neutrophils Absolute 2.3 K/uL    Lymphocytes Absolute 1.7 K/uL    Monocytes Absolute 0.3 K/uL    Eosinophils Absolute 0.0 K/uL    Basophils Absolute 0.0 K/uL   LACTATE DEHYDROGENASE, ISOENZYMES [[id number]]    Collected: 01/09/23 1205    Updated: 01/09/23 1217    Specimen Source: Blood    PROTEIN TOTAL URINE RANDOM, ON [[id number]] (Abnormal)    Collected: 01/09/23 1106    Updated: 01/09/23 1130    Specimen Source: Urine, clean catch     Protein, Ur 120.0 mg/dL    Creatinine, Ur 137.6 mg/dL    Protein/Creat Ratio 0.9 High  mL/mL    Protein/Creat Ratio 0.9 mL/mL   Urine Drug Screen [[id number]]    Collected: 01/09/23 1106    Updated: 01/09/23 1129    Specimen Source: Urine     Amphetamine Screen, Urine Neg    Barbiturate Screen, Ur Neg    Benzodiazepine Screen, Urine Neg    Cannabinoid Scrn, Ur Neg    Cocaine Metabolite Screen, Urine Neg    Opiate Scrn, Ur Neg    PCP Screen, Urine Neg    Methadone Screen, Urine Neg    Propoxyphene Scrn, Ur Neg    Oxycodone Urine Neg    FENTANYL SCREEN, URINE Neg    Drug Screen Comment: see below    Comment: This method is a screening test to detect only these drug   classes as part of a medical workup. Confirmatory testing   by another method should be ordered if clinically indicated. ASSESSMENT & PLAN:    Assessment:   Twin IUP @ 35.4 weeks, Mono Di   New Onset Proteinuria-suggestive of Pre-Eclampsia  Iron Deficiency Anemia      Plan:   NST q shift  Monitor BP curve. Second dose of BMZ @ 1230pm  Venofer IV infusion today.    Anticipate repeat CD with BS 1/11/2023

## 2023-01-10 NOTE — FLOWSHEET NOTE
[facility] Dr. Coles report patient complaints of headache and heart racing. Patient declines vision changes, anxiety, and history of migraines. New order for 500 cc LR bolus.

## 2023-01-11 ENCOUNTER — ANESTHESIA EVENT (OUTPATIENT)
Dept: LABOR AND DELIVERY | Age: 28
End: 2023-01-11

## 2023-01-11 ENCOUNTER — ANESTHESIA (OUTPATIENT)
Dept: LABOR AND DELIVERY | Age: 28
DRG: 539 | End: 2023-01-11
Payer: MEDICAID

## 2023-01-11 ENCOUNTER — ANESTHESIA (OUTPATIENT)
Dept: LABOR AND DELIVERY | Age: 28
End: 2023-01-11

## 2023-01-11 PROBLEM — O12.13 PROTEINURIA AFFECTING PREGNANCY IN THIRD TRIMESTER: Status: ACTIVE | Noted: 2023-01-11

## 2023-01-11 LAB
BASOPHILS ABSOLUTE: 0 K/UL (ref 0–0.2)
BASOPHILS RELATIVE PERCENT: 0.2 %
EOSINOPHILS ABSOLUTE: 0 K/UL (ref 0–0.7)
EOSINOPHILS RELATIVE PERCENT: 0 %
HCT VFR BLD CALC: 26.8 % (ref 37–47)
HEMOGLOBIN: 8.8 G/DL (ref 12–16)
LACTATE DEHYDROGENASE: 209 U/L (ref 105–230)
LD ISOENZYME 1: 27 % (ref 14–27)
LD ISOENZYME 2: 28 % (ref 29–42)
LD ISOENZYME 3: 27 % (ref 18–30)
LD ISOENZYME 4: 10 % (ref 8–15)
LD ISOENZYME 5: 8 % (ref 6–23)
LYMPHOCYTES ABSOLUTE: 1.5 K/UL (ref 1–4.8)
LYMPHOCYTES RELATIVE PERCENT: 27.4 %
MCH RBC QN AUTO: 28 PG (ref 27–31.3)
MCHC RBC AUTO-ENTMCNC: 32.7 % (ref 33–37)
MCV RBC AUTO: 85.7 FL (ref 79.4–94.8)
MONOCYTES ABSOLUTE: 0.3 K/UL (ref 0.2–0.8)
MONOCYTES RELATIVE PERCENT: 6 %
NEUTROPHILS ABSOLUTE: 3.7 K/UL (ref 1.4–6.5)
NEUTROPHILS RELATIVE PERCENT: 66.4 %
PDW BLD-RTO: 18.6 % (ref 11.5–14.5)
PLATELET # BLD: 199 K/UL (ref 130–400)
RBC # BLD: 3.13 M/UL (ref 4.2–5.4)
WBC # BLD: 5.6 K/UL (ref 4.8–10.8)

## 2023-01-11 PROCEDURE — 3609079900 HC CESAREAN SECTION: Performed by: OBSTETRICS & GYNECOLOGY

## 2023-01-11 PROCEDURE — 6370000000 HC RX 637 (ALT 250 FOR IP): Performed by: OBSTETRICS & GYNECOLOGY

## 2023-01-11 PROCEDURE — 3700000001 HC ADD 15 MINUTES (ANESTHESIA): Performed by: OBSTETRICS & GYNECOLOGY

## 2023-01-11 PROCEDURE — 1220000000 HC SEMI PRIVATE OB R&B

## 2023-01-11 PROCEDURE — 7100000001 HC PACU RECOVERY - ADDTL 15 MIN: Performed by: OBSTETRICS & GYNECOLOGY

## 2023-01-11 PROCEDURE — 6360000002 HC RX W HCPCS: Performed by: NURSE ANESTHETIST, CERTIFIED REGISTERED

## 2023-01-11 PROCEDURE — 6360000002 HC RX W HCPCS: Performed by: OBSTETRICS & GYNECOLOGY

## 2023-01-11 PROCEDURE — 7100000000 HC PACU RECOVERY - FIRST 15 MIN: Performed by: OBSTETRICS & GYNECOLOGY

## 2023-01-11 PROCEDURE — 2500000003 HC RX 250 WO HCPCS: Performed by: OBSTETRICS & GYNECOLOGY

## 2023-01-11 PROCEDURE — A4216 STERILE WATER/SALINE, 10 ML: HCPCS | Performed by: OBSTETRICS & GYNECOLOGY

## 2023-01-11 PROCEDURE — 2500000003 HC RX 250 WO HCPCS: Performed by: NURSE ANESTHETIST, CERTIFIED REGISTERED

## 2023-01-11 PROCEDURE — 85025 COMPLETE CBC W/AUTO DIFF WBC: CPT

## 2023-01-11 PROCEDURE — 2709999900 HC NON-CHARGEABLE SUPPLY: Performed by: OBSTETRICS & GYNECOLOGY

## 2023-01-11 PROCEDURE — 2580000003 HC RX 258: Performed by: OBSTETRICS & GYNECOLOGY

## 2023-01-11 PROCEDURE — 0UT70ZZ RESECTION OF BILATERAL FALLOPIAN TUBES, OPEN APPROACH: ICD-10-PCS | Performed by: OBSTETRICS & GYNECOLOGY

## 2023-01-11 PROCEDURE — 51702 INSERT TEMP BLADDER CATH: CPT

## 2023-01-11 PROCEDURE — 3700000000 HC ANESTHESIA ATTENDED CARE: Performed by: OBSTETRICS & GYNECOLOGY

## 2023-01-11 PROCEDURE — 2580000003 HC RX 258: Performed by: NURSE ANESTHETIST, CERTIFIED REGISTERED

## 2023-01-11 RX ORDER — ACETAMINOPHEN 500 MG
1000 TABLET ORAL EVERY 8 HOURS PRN
Status: DISCONTINUED | OUTPATIENT
Start: 2023-01-11 | End: 2023-01-14 | Stop reason: HOSPADM

## 2023-01-11 RX ORDER — KETOROLAC TROMETHAMINE 30 MG/ML
INJECTION, SOLUTION INTRAMUSCULAR; INTRAVENOUS PRN
Status: DISCONTINUED | OUTPATIENT
Start: 2023-01-11 | End: 2023-01-11 | Stop reason: SDUPTHER

## 2023-01-11 RX ORDER — NALOXONE HYDROCHLORIDE 0.4 MG/ML
INJECTION, SOLUTION INTRAMUSCULAR; INTRAVENOUS; SUBCUTANEOUS PRN
Status: DISCONTINUED | OUTPATIENT
Start: 2023-01-11 | End: 2023-01-11

## 2023-01-11 RX ORDER — IBUPROFEN 600 MG/1
600 TABLET ORAL EVERY 6 HOURS PRN
Qty: 120 TABLET | Refills: 3 | Status: SHIPPED | OUTPATIENT
Start: 2023-01-11

## 2023-01-11 RX ORDER — DEXAMETHASONE SODIUM PHOSPHATE 10 MG/ML
INJECTION, SOLUTION INTRAMUSCULAR; INTRAVENOUS PRN
Status: DISCONTINUED | OUTPATIENT
Start: 2023-01-11 | End: 2023-01-11 | Stop reason: SDUPTHER

## 2023-01-11 RX ORDER — KETOROLAC TROMETHAMINE 30 MG/ML
30 INJECTION, SOLUTION INTRAMUSCULAR; INTRAVENOUS EVERY 6 HOURS
Status: DISPENSED | OUTPATIENT
Start: 2023-01-11 | End: 2023-01-14

## 2023-01-11 RX ORDER — FENTANYL CITRATE 50 UG/ML
INJECTION, SOLUTION INTRAMUSCULAR; INTRAVENOUS PRN
Status: DISCONTINUED | OUTPATIENT
Start: 2023-01-11 | End: 2023-01-11 | Stop reason: SDUPTHER

## 2023-01-11 RX ORDER — ONDANSETRON 2 MG/ML
4 INJECTION INTRAMUSCULAR; INTRAVENOUS EVERY 6 HOURS PRN
Status: DISCONTINUED | OUTPATIENT
Start: 2023-01-11 | End: 2023-01-14 | Stop reason: HOSPADM

## 2023-01-11 RX ORDER — OXYCODONE HYDROCHLORIDE AND ACETAMINOPHEN 5; 325 MG/1; MG/1
2 TABLET ORAL ONCE
Status: DISCONTINUED | OUTPATIENT
Start: 2023-01-11 | End: 2023-01-11

## 2023-01-11 RX ORDER — SODIUM CHLORIDE 0.9 % (FLUSH) 0.9 %
5-40 SYRINGE (ML) INJECTION EVERY 12 HOURS SCHEDULED
Status: DISCONTINUED | OUTPATIENT
Start: 2023-01-11 | End: 2023-01-14 | Stop reason: HOSPADM

## 2023-01-11 RX ORDER — NALBUPHINE HYDROCHLORIDE 20 MG/ML
10 INJECTION, SOLUTION INTRAMUSCULAR; INTRAVENOUS; SUBCUTANEOUS EVERY 4 HOURS PRN
Status: DISPENSED | OUTPATIENT
Start: 2023-01-11 | End: 2023-01-12

## 2023-01-11 RX ORDER — MISOPROSTOL 200 UG/1
TABLET ORAL
Status: DISCONTINUED
Start: 2023-01-11 | End: 2023-01-11 | Stop reason: WASHOUT

## 2023-01-11 RX ORDER — DIPHENHYDRAMINE HCL 25 MG
25 TABLET ORAL EVERY 6 HOURS PRN
Status: DISCONTINUED | OUTPATIENT
Start: 2023-01-11 | End: 2023-01-14 | Stop reason: HOSPADM

## 2023-01-11 RX ORDER — SODIUM CHLORIDE 0.9 % (FLUSH) 0.9 %
5-40 SYRINGE (ML) INJECTION PRN
Status: DISCONTINUED | OUTPATIENT
Start: 2023-01-11 | End: 2023-01-14 | Stop reason: HOSPADM

## 2023-01-11 RX ORDER — HYDROCODONE BITARTRATE AND ACETAMINOPHEN 5; 325 MG/1; MG/1
2 TABLET ORAL EVERY 6 HOURS PRN
Status: DISCONTINUED | OUTPATIENT
Start: 2023-01-11 | End: 2023-01-14 | Stop reason: HOSPADM

## 2023-01-11 RX ORDER — DOCUSATE SODIUM 100 MG/1
100 CAPSULE, LIQUID FILLED ORAL DAILY
Status: DISCONTINUED | OUTPATIENT
Start: 2023-01-11 | End: 2023-01-14 | Stop reason: HOSPADM

## 2023-01-11 RX ORDER — SODIUM CHLORIDE, SODIUM LACTATE, POTASSIUM CHLORIDE, CALCIUM CHLORIDE 600; 310; 30; 20 MG/100ML; MG/100ML; MG/100ML; MG/100ML
INJECTION, SOLUTION INTRAVENOUS CONTINUOUS PRN
Status: DISCONTINUED | OUTPATIENT
Start: 2023-01-11 | End: 2023-01-11 | Stop reason: SDUPTHER

## 2023-01-11 RX ORDER — OXYCODONE HYDROCHLORIDE AND ACETAMINOPHEN 5; 325 MG/1; MG/1
1 TABLET ORAL EVERY 4 HOURS PRN
Status: DISCONTINUED | OUTPATIENT
Start: 2023-01-11 | End: 2023-01-11

## 2023-01-11 RX ORDER — CARBOPROST TROMETHAMINE 250 UG/ML
INJECTION, SOLUTION INTRAMUSCULAR
Status: DISCONTINUED
Start: 2023-01-11 | End: 2023-01-11 | Stop reason: WASHOUT

## 2023-01-11 RX ORDER — SODIUM CHLORIDE 9 MG/ML
INJECTION, SOLUTION INTRAVENOUS PRN
Status: DISCONTINUED | OUTPATIENT
Start: 2023-01-11 | End: 2023-01-14 | Stop reason: HOSPADM

## 2023-01-11 RX ORDER — ONDANSETRON 2 MG/ML
4 INJECTION INTRAMUSCULAR; INTRAVENOUS EVERY 6 HOURS PRN
Status: DISCONTINUED | OUTPATIENT
Start: 2023-01-11 | End: 2023-01-11

## 2023-01-11 RX ORDER — PRENATAL VIT/IRON FUM/FOLIC AC 27MG-0.8MG
1 TABLET ORAL DAILY
Status: DISCONTINUED | OUTPATIENT
Start: 2023-01-11 | End: 2023-01-14 | Stop reason: HOSPADM

## 2023-01-11 RX ORDER — ONDANSETRON 2 MG/ML
INJECTION INTRAMUSCULAR; INTRAVENOUS PRN
Status: DISCONTINUED | OUTPATIENT
Start: 2023-01-11 | End: 2023-01-11 | Stop reason: SDUPTHER

## 2023-01-11 RX ORDER — NIFEDIPINE 30 MG/1
30 TABLET, EXTENDED RELEASE ORAL DAILY
Status: DISCONTINUED | OUTPATIENT
Start: 2023-01-11 | End: 2023-01-14 | Stop reason: HOSPADM

## 2023-01-11 RX ORDER — SIMETHICONE 80 MG
80 TABLET,CHEWABLE ORAL EVERY 6 HOURS PRN
Status: DISCONTINUED | OUTPATIENT
Start: 2023-01-11 | End: 2023-01-14 | Stop reason: HOSPADM

## 2023-01-11 RX ORDER — OXYTOCIN 10 [USP'U]/ML
INJECTION, SOLUTION INTRAMUSCULAR; INTRAVENOUS
Status: DISCONTINUED
Start: 2023-01-11 | End: 2023-01-11 | Stop reason: WASHOUT

## 2023-01-11 RX ORDER — SODIUM CHLORIDE, SODIUM LACTATE, POTASSIUM CHLORIDE, CALCIUM CHLORIDE 600; 310; 30; 20 MG/100ML; MG/100ML; MG/100ML; MG/100ML
INJECTION, SOLUTION INTRAVENOUS CONTINUOUS
Status: DISCONTINUED | OUTPATIENT
Start: 2023-01-11 | End: 2023-01-14 | Stop reason: HOSPADM

## 2023-01-11 RX ORDER — TRANEXAMIC ACID 100 MG/ML
INJECTION, SOLUTION INTRAVENOUS
Status: DISCONTINUED
Start: 2023-01-11 | End: 2023-01-11 | Stop reason: WASHOUT

## 2023-01-11 RX ORDER — MAG HYDROX/ALUMINUM HYD/SIMETH 400-400-40
SUSPENSION, ORAL (FINAL DOSE FORM) ORAL PRN
Status: DISCONTINUED | OUTPATIENT
Start: 2023-01-11 | End: 2023-01-14 | Stop reason: HOSPADM

## 2023-01-11 RX ORDER — CALCIUM CARBONATE 200(500)MG
500 TABLET,CHEWABLE ORAL 3 TIMES DAILY PRN
Status: DISCONTINUED | OUTPATIENT
Start: 2023-01-11 | End: 2023-01-14 | Stop reason: HOSPADM

## 2023-01-11 RX ORDER — SODIUM CHLORIDE, SODIUM LACTATE, POTASSIUM CHLORIDE, CALCIUM CHLORIDE 600; 310; 30; 20 MG/100ML; MG/100ML; MG/100ML; MG/100ML
INJECTION, SOLUTION INTRAVENOUS
Status: COMPLETED
Start: 2023-01-11 | End: 2023-01-11

## 2023-01-11 RX ORDER — OXYCODONE HYDROCHLORIDE AND ACETAMINOPHEN 5; 325 MG/1; MG/1
1 TABLET ORAL EVERY 6 HOURS PRN
Qty: 20 TABLET | Refills: 0 | Status: SHIPPED | OUTPATIENT
Start: 2023-01-11 | End: 2023-01-16

## 2023-01-11 RX ORDER — ACETAMINOPHEN 500 MG
1000 TABLET ORAL EVERY 8 HOURS PRN
Status: DISCONTINUED | OUTPATIENT
Start: 2023-01-11 | End: 2023-01-11

## 2023-01-11 RX ORDER — BUPIVACAINE HYDROCHLORIDE 7.5 MG/ML
INJECTION, SOLUTION INTRASPINAL PRN
Status: DISCONTINUED | OUTPATIENT
Start: 2023-01-11 | End: 2023-01-11 | Stop reason: SDUPTHER

## 2023-01-11 RX ORDER — IBUPROFEN 600 MG/1
600 TABLET ORAL EVERY 6 HOURS PRN
Status: DISCONTINUED | OUTPATIENT
Start: 2023-01-11 | End: 2023-01-14 | Stop reason: HOSPADM

## 2023-01-11 RX ORDER — ONDANSETRON 4 MG/1
4 TABLET, ORALLY DISINTEGRATING ORAL EVERY 8 HOURS PRN
Status: DISCONTINUED | OUTPATIENT
Start: 2023-01-11 | End: 2023-01-14 | Stop reason: HOSPADM

## 2023-01-11 RX ORDER — MODIFIED LANOLIN
OINTMENT (GRAM) TOPICAL
Status: DISCONTINUED | OUTPATIENT
Start: 2023-01-11 | End: 2023-01-14 | Stop reason: HOSPADM

## 2023-01-11 RX ORDER — DIPHENHYDRAMINE HYDROCHLORIDE 50 MG/ML
25 INJECTION INTRAMUSCULAR; INTRAVENOUS EVERY 6 HOURS PRN
Status: DISCONTINUED | OUTPATIENT
Start: 2023-01-11 | End: 2023-01-14 | Stop reason: HOSPADM

## 2023-01-11 RX ORDER — HETASTARCH 6 G/100ML
INJECTION, SOLUTION INTRAVENOUS
Status: DISCONTINUED
Start: 2023-01-11 | End: 2023-01-11 | Stop reason: WASHOUT

## 2023-01-11 RX ORDER — SODIUM CHLORIDE 9 MG/ML
INJECTION, SOLUTION INTRAVENOUS
Status: DISCONTINUED
Start: 2023-01-11 | End: 2023-01-11 | Stop reason: WASHOUT

## 2023-01-11 RX ORDER — METHYLERGONOVINE MALEATE 0.2 MG/ML
INJECTION INTRAVENOUS
Status: DISCONTINUED
Start: 2023-01-11 | End: 2023-01-11 | Stop reason: WASHOUT

## 2023-01-11 RX ADMIN — ACETAMINOPHEN 1000 MG: 500 TABLET ORAL at 11:53

## 2023-01-11 RX ADMIN — HYDROCODONE BITARTRATE AND ACETAMINOPHEN 2 TABLET: 5; 325 TABLET ORAL at 18:11

## 2023-01-11 RX ADMIN — SODIUM CHLORIDE, POTASSIUM CHLORIDE, SODIUM LACTATE AND CALCIUM CHLORIDE: 600; 310; 30; 20 INJECTION, SOLUTION INTRAVENOUS at 08:55

## 2023-01-11 RX ADMIN — FENTANYL CITRATE 15 MCG: 50 INJECTION, SOLUTION INTRAMUSCULAR; INTRAVENOUS at 08:24

## 2023-01-11 RX ADMIN — GENTAMICIN SULFATE 120 MG: 40 INJECTION, SOLUTION INTRAMUSCULAR; INTRAVENOUS at 08:25

## 2023-01-11 RX ADMIN — Medication 999 ML/HR: at 08:44

## 2023-01-11 RX ADMIN — SODIUM CHLORIDE, POTASSIUM CHLORIDE, SODIUM LACTATE AND CALCIUM CHLORIDE: 600; 310; 30; 20 INJECTION, SOLUTION INTRAVENOUS at 08:10

## 2023-01-11 RX ADMIN — FAMOTIDINE 20 MG: 10 INJECTION, SOLUTION INTRAVENOUS at 06:04

## 2023-01-11 RX ADMIN — DEXAMETHASONE SODIUM PHOSPHATE 10 MG: 10 INJECTION, SOLUTION INTRAMUSCULAR; INTRAVENOUS at 08:51

## 2023-01-11 RX ADMIN — KETOROLAC TROMETHAMINE 30 MG: 30 INJECTION, SOLUTION INTRAMUSCULAR; INTRAVENOUS at 15:09

## 2023-01-11 RX ADMIN — NALBUPHINE HYDROCHLORIDE 10 MG: 20 INJECTION, SOLUTION INTRAMUSCULAR; INTRAVENOUS; SUBCUTANEOUS at 14:03

## 2023-01-11 RX ADMIN — KETOROLAC TROMETHAMINE 30 MG: 30 INJECTION, SOLUTION INTRAMUSCULAR; INTRAVENOUS at 21:41

## 2023-01-11 RX ADMIN — KETOROLAC TROMETHAMINE 60 MG: 30 INJECTION, SOLUTION INTRAMUSCULAR at 09:01

## 2023-01-11 RX ADMIN — CLINDAMYCIN IN 5 PERCENT DEXTROSE 900 MG: 18 INJECTION, SOLUTION INTRAVENOUS at 08:25

## 2023-01-11 RX ADMIN — BUPIVACAINE HYDROCHLORIDE 1.6 ML: 7.5 INJECTION, SOLUTION INTRASPINAL at 08:24

## 2023-01-11 RX ADMIN — ACETAMINOPHEN 1000 MG: 500 TABLET ORAL at 23:06

## 2023-01-11 RX ADMIN — PHENYLEPHRINE HYDROCHLORIDE 100 MCG: 10 INJECTION INTRAVENOUS at 08:51

## 2023-01-11 RX ADMIN — NIFEDIPINE 30 MG: 30 TABLET, EXTENDED RELEASE ORAL at 13:17

## 2023-01-11 RX ADMIN — ONDANSETRON 4 MG: 2 INJECTION INTRAMUSCULAR; INTRAVENOUS at 08:51

## 2023-01-11 ASSESSMENT — PAIN DESCRIPTION - DESCRIPTORS
DESCRIPTORS: ACHING;CRAMPING
DESCRIPTORS: CRAMPING
DESCRIPTORS: CRAMPING;ACHING

## 2023-01-11 ASSESSMENT — PAIN SCALES - GENERAL
PAINLEVEL_OUTOF10: 3
PAINLEVEL_OUTOF10: 5
PAINLEVEL_OUTOF10: 6
PAINLEVEL_OUTOF10: 2
PAINLEVEL_OUTOF10: 6
PAINLEVEL_OUTOF10: 6

## 2023-01-11 ASSESSMENT — PAIN DESCRIPTION - LOCATION
LOCATION: ABDOMEN
LOCATION: ABDOMEN
LOCATION: ABDOMEN;INCISION
LOCATION: ABDOMEN
LOCATION: ABDOMEN

## 2023-01-11 ASSESSMENT — PAIN DESCRIPTION - ORIENTATION
ORIENTATION: LOWER

## 2023-01-11 ASSESSMENT — PAIN - FUNCTIONAL ASSESSMENT
PAIN_FUNCTIONAL_ASSESSMENT: ACTIVITIES ARE NOT PREVENTED
PAIN_FUNCTIONAL_ASSESSMENT: ACTIVITIES ARE NOT PREVENTED

## 2023-01-11 NOTE — OP NOTE
OPERATIVE NOTE:  DELIVERY WITH BILATERAL SALPINGECTOMY    33 y.o. Janina Acosta at 35w5d presented to L&D for repeat  delivery. PT with mono/di twins and gestational proteinuria. Pt also with increased risk factors for GYN related cancers and pt planned for risk reducing bilateral salpingectomy. Keyla Jain MD requested an ethics consult for Dat Carter ([id number]) a 33 y.o. female who has requested a bilateral salpingectomy to reduce the risk of ovarian cancer. The patient will have bilateral salpingectomy at time of repeat CD. The patient has increased risk for GYN cancers. The patient has been counseled regarding irreversible infertility. This procedure is consistent with the ERDs, as there is an increased likelihood of existing pathology in her fallopian tubes, and the procedure is morally justified. Risks, benefits and alternative therapies for  delivery were reviewed with the patient and the patient agrees to proceed with planned repeat  delivery. Discussed at length the risks and benefits of concurrent bilateral salpingectomy with patient's upcoming scheduled abdominal or pelvic surgery per recommendation of the Society of Gynecologic Oncology, American College of Obstetricians and Gynecologists as this patient is at above average risk for development of ovarian cancer. Advised patient that the primary benefit of such surgery is a 65% reduction in future risk of ovarian cancer. Patient advised that large scale studies have not demonstrated an increase in estimated blood loss, complications, or operating time but that bleeding, infection, and injury to nearby organs are potential complications with this additional surgery. Finally, patient has been thoroughly counseled regarding the consequence of loss of fertility following this procedure.  Patient understands that this loss of fertility cannot be reversed and has expressed via verbal and written consent that her wishes are to proceed with this surgery for the purposes of ovarian cancer reduction. PreOp Dx:  Section with increased risk of GYN cancers, twin gestation  PostOp Dx: same   Procedure: Low Transverse  section with bilateral salpingectomy  Surgeon: Boston Ramos  Assistant: Gail Bo  Anesthesia: Spinal   EBL: 800cc - please see nursing chart/notes for additional EBL  Findings: viable twins at 08:41. Twin A with Apgars 8, 9 wt 2205g, Twin B with Apgars 8, 9, wt 2515g      Pt was taken to the OR where she was prepped and draped in the normal sterile fashion in a supine position with a leftward tilt. Time out was then performed to confirm proper patient, placement and procedure. After ensuring adequate anesthesia, a Pfannenstiel incision was made, carried to the the fascia which was then incised in the midline and extended laterally with cautery. The rectus muscle was then dissected off the fascia superiorly and the peritoneum was entered bluntly. An Nestor retractor was then placed without difficulty. The hysterotomy was then made and the uterine cavity was entered bluntly. Clear amniotic fluid was noted. Twin A's head was then grasped and delivered atraumatically followed quickly by the rest of the body. The cord was then clamped and cut and the infant was handed to the awaiting nurse. Twin B's head was at the hysterotomy and delivered atraumatically followed quickly by the rest of the body. A nuchal/shoulder cord was noted on Twin B and reduced upon delivery. The cord was then clamped and cut and the infant was handed to the awaiting nurse. The cord blood was then drawn for labs for both infants and the placenta delivered spontaneously. The uterus was then cleared of all clots and debris. The uterus was exteriorized. The hysterotomy was then closed in a running locked fashion. A second layer of imbricating suture was placed for a 2-layer closure. The hysterotomy was again inspected and excellent hemostasis was noted. Attention was then turned to the fallopian tubes. Using the sonja retractor and the Ligasure instrument, bilateral fallopian tubes were completely removed, including fimbriae. The tube specimens were sent to pathology. Excellent hemostasis was noted. The uterus was replaced in the abdomen. The Nestor retractor was then removed and the peritoneum was closed in a running fashion followed by the fascia which was closed in a running fashion. The subcutaneous tissue was then irrigated and re-approximated with suture and the skin was closed with subcuticular absorbable staples. Vital signs were stable throughout, sponge and needle counts were correct x 3 and mother was noted to have excellent uterine tone. Pt was noted to have drainage of clear yellow urine throughout the case. Pt was taken to recovery in stable condition. The use of a first assistant surgeon was necessary because there was no qualified resident surgeon available. The assistant surgeon assisted in the proper positioning, prepping, and draping of the patient, intraoperative retraction and suctioning for visualization, passing sutures and suture management.     Dilip Rueda MD

## 2023-01-11 NOTE — ANESTHESIA PRE PROCEDURE
Department of Anesthesiology  Preprocedure Note       Name:  Wil Monreal   Age:  33 y.o.  :  1995                                          MRN:  [id number]         Date:  2023      Surgeon: Angel Luis Pastrana):  Odessa Lowery MD    Procedure: Procedure(s):   SECTION    Medications prior to admission:   Prior to Admission medications    Medication Sig Start Date End Date Taking?  Authorizing Provider   ondansetron (ZOFRAN) 4 MG tablet Take 1 tablet by mouth every 8 hours as needed for Nausea  Patient not taking: Reported on 22   JOSEE Serra   Pediatric Multivitamins-Iron (POLY-VITAMIN/IRON) 10 MG/ML SOLN Take by mouth    Historical Provider, MD   ferrous sulfate (IRON 325) 325 (65 Fe) MG tablet Take 1 tablet by mouth 2 times daily  Patient not taking: Reported on 22   Odessa Lowery MD   docusate sodium (COLACE) 100 MG capsule Take 1 capsule by mouth 2 times daily  Patient not taking: No sig reported 22  Odessa Lowery MD   Loratadine (CLARITIN PO) Take by mouth  Patient not taking: No sig reported    Historical Provider, MD   Prenatal Vit-Fe Fumarate-FA (PRENATAL PO) Take by mouth  Patient not taking: No sig reported    Historical Provider, MD       Current medications:    Current Facility-Administered Medications   Medication Dose Route Frequency Provider Last Rate Last Admin    lactated ringers infusion             oxytocin (PITOCIN) 30 units in 500 mL infusion  1-20 michelle-units/min IntraVENous Continuous Odessa Lowery MD        famotidine (PEPCID) 20 mg in sodium chloride (PF) 0.9 % 10 mL injection  20 mg IntraVENous BID Odessa Lowery MD   20 mg at 23 0604    gentamicin (GARAMYCIN) 120 mg in dextrose 5 % 100 mL IVPB  120 mg IntraVENous Q8H Odessa Lowery MD        scopolamine (TRANSDERM-SCOP) transdermal patch 1 patch  1 patch TransDERmal Q72H Odessa Lowery MD   1 patch at 23 0603    clindamycin (CLEOCIN) 900 mg in dextrose 5 % 50 mL IVPB  900 mg IntraVENous Q8H Kanika Cutler MD        NIFEdipine (PROCARDIA XL) extended release tablet 30 mg  30 mg Oral Daily Berince Ma MD           Allergies:     Allergies   Allergen Reactions    Penicillins Hives       Problem List:    Patient Active Problem List   Diagnosis Code    Anemia D64.9    [facility] ([facility] in remission C81.90    Single episode of elevated blood pressure R03.0    Thrombocytopenia affecting pregnancy ([facility] O99.119, D69.6    Monochorionic diamniotic twin gestation in third trimester O27.46    Dehydration during pregnancy O99.280, E86.0    Anemia affecting third pregnancy O99.019    Admitted to labor and delivery Z78.9       Past Medical History:        Diagnosis Date    Cough variant asthma 2/6/2014    Hodgkin disease ([facility]     Hodgkin's    Kidney stone        Past Surgical History:        Procedure Laterality Date    BREAST FIBROADENOMA SURGERY  01/2013   [facility] DENTAL SURG[facility]  01/2022    all wisdom teeth removed     TYMPANOSTOMY [facility] EXTRACTION  01/06/2022       Social History:    Social History     Tobacco Use    Smoking status: Never    Smokeless tobacco: Never   Substance Use Topics    Alcohol use: Not Currently     Comment: rare                                Counseling given: Not Answered      Vital Signs (Current):   Vitals:    01/10/23 2004 01/10/23 2328 01/11/23 0559 01/11/23 0653   BP: 129/80 121/78 (!) 146/83 131/85   Pulse: 74 71 73 67   Resp: 16 16 16    Temp: 36.7 °C (98 °F) 36.6 °C (97.8 °F) 36.7 °C (98.1 °F)    TempSrc: Oral Oral Oral    SpO2: 98% 97% 94%                                               BP Readings from Last 3 Encounters:   01/11/23 131/85   01/09/23 126/72   12/30/22 118/74       NPO Status:                                                                                 BMI:   Wt Readings from Last 3 Encounters:   01/09/23 156 lb (70.8 kg)   12/30/22 153 lb (69.4 kg)   12/21/22 150 lb (68 kg)     There is no height or weight on file to calculate BMI.    CBC:   Lab Results   Component Value Date/Time    WBC 5.6 01/11/2023 06:58 AM    RBC 3.13 01/11/2023 06:58 AM    RBC 4.11 04/01/2012 03:51 PM    HGB 8.8 01/11/2023 06:58 AM    HCT 26.8 01/11/2023 06:58 AM    MCV 85.7 01/11/2023 06:58 AM    RDW 18.6 01/11/2023 06:58 AM     01/11/2023 06:58 AM       CMP:   Lab Results   Component Value Date/Time     01/09/2023 12:08 PM    K 4.1 01/09/2023 12:08 PM     01/09/2023 12:08 PM    CO2 19 01/09/2023 12:08 PM    BUN 21 01/09/2023 12:08 PM    CREATININE 0.71 01/09/2023 12:08 PM    GFRAA >60.0 06/23/2022 05:15 PM    LABGLOM >60.0 01/09/2023 12:08 PM    GLUCOSE 77 01/09/2023 12:08 PM    GLUCOSE 90 04/01/2012 03:51 PM    PROT 5.7 01/09/2023 12:08 PM    CALCIUM 8.6 01/09/2023 12:08 PM    BILITOT <0.2 01/09/2023 12:08 PM    ALKPHOS 146 01/09/2023 12:08 PM    AST 18 01/09/2023 12:08 PM    ALT 6 01/09/2023 12:08 PM       POC Tests: No results for input(s): POCGLU, POCNA, POCK, POCCL, POCBUN, POCHEMO, POCHCT in the last 72 hours.     Coags:   Lab Results   Component Value Date/Time    PROTIME 13.1 06/23/2022 05:15 PM    INR 1.0 06/23/2022 05:15 PM    APTT 30.7 06/23/2022 05:15 PM       HCG (If Applicable):   Lab Results   Component Value Date    PREGTESTUR positive 06/14/2022        ABGs: No results found for: PHART, PO2ART, UPK8ORP, IDP5KRG, BEART, E5BIIHDS     Type & Screen (If Applicable):  No results found for: LABABO, LABRH    Drug/Infectious Status (If Applicable):  Lab Results   Component Value Date/Time    HEPCAB NONREACTIVE 07/13/2022 02:30 PM       COVID-19 Screening (If Applicable):   Lab Results   Component Value Date/Time    COVID19 Not Detected 01/09/2023 12:49 PM    COVID19 Detected 12/27/2021 05:33 PM           Anesthesia Evaluation  Patient summary reviewed and Nursing notes reviewed no history of anesthetic complications:   Airway: Mallampati: II  TM distance: >3 FB   Neck ROM: full  Mouth opening: > = 3 FB   Dental:          Pulmonary:normal exam    (+) asthma:                            Cardiovascular:Negative CV ROS                      Neuro/Psych:   Negative Neuro/Psych ROS              GI/Hepatic/Renal: Neg GI/Hepatic/Renal ROS            Endo/Other:                      ROS comment: Hx non-Hodgkin's lymphoma, remission Abdominal:   (+) obese,           Vascular: negative vascular ROS. Other Findings:           Anesthesia Plan      spinal     ASA 2           MIPS: Postoperative opioids intended and Prophylactic antiemetics administered. Anesthetic plan and risks discussed with patient. Use of blood products discussed with patient whom.    Plan discussed with surgical team.          Post-op pain plan if not by surgeon: intrathecal narcotics            ADRIAN Mercedes - CRNA   1/11/2023

## 2023-01-11 NOTE — FLOWSHEET NOTE
Call to Dr. Mgagi Sanabria to read NST, per Dr. Maggi Sanabria NST is reactive and ok to take off monitors.

## 2023-01-11 NOTE — ANESTHESIA POSTPROCEDURE EVALUATION
Department of Anesthesiology  Postprocedure Note    Patient: Carlos Juárez  MRN: [id number]  YOB: 1995  Date of evaluation: 2023      Procedure Summary     Date: 23 Room / Location: [address]    Anesthesia Start: 0554 Anesthesia Stop: 3135    Procedure:  SECTION Diagnosis:       Twin birth delivered by  section in hospital      (Twin birth delivered by  section in hospital [Z38.31])    Surgeons: Lorre Canavan, MD Responsible Provider: ADRIAN Hurtado CRNA    Anesthesia Type: spinal ASA Status: 2          Anesthesia Type: No value filed.     Clair Phase I: Clair Score: 9    Clair Phase II:        Anesthesia Post Evaluation    Patient location during evaluation: bedside  Patient participation: complete - patient participated  Level of consciousness: awake and awake and alert  Pain score: 0  Airway patency: patent  Nausea & Vomiting: no nausea and no vomiting  Complications: no  Cardiovascular status: blood pressure returned to baseline and hemodynamically stable  Respiratory status: acceptable  Hydration status: euvolemic

## 2023-01-11 NOTE — ANESTHESIA PROCEDURE NOTES
Spinal Block    Patient location during procedure: OR  End time: 1/11/2023 8:25 AM  Reason for block: primary anesthetic  Staffing  Performed: resident/CRNA   Resident/CRNA: Kaye Sanchez APRN - CRNA  Spinal Block  Patient position: sitting  Prep: Betadine  Patient monitoring: cardiac monitor, continuous pulse ox and frequent blood pressure checks  Approach: midline  Location: L4/L5  Provider prep: mask and sterile gloves  Local infiltration: lidocaine  Needle  Needle type: Pencan   Needle gauge: 22 G  Needle length: 3.5 in  Expiration date: 9/6/2023  Assessment  Sensory level: T8  Swirl obtained: Yes  CSF: clear  Attempts: 3+  Hemodynamics: stable  Preanesthetic Checklist  Completed: patient identified, IV checked, site marked, risks and benefits discussed, surgical/procedural consents, equipment checked, pre-op evaluation, timeout performed, anesthesia consent given, oxygen available, monitors applied/VS acknowledged, fire risk safety assessment completed and verbalized and blood product R/B/A discussed and consented

## 2023-01-11 NOTE — PLAN OF CARE
Problem: Pain  Goal: Verbalizes/displays adequate comfort level or baseline comfort level  1/11/2023 0735 by Madelaine Calderon RN  Outcome: Progressing  1/10/2023 2018 by Collin Schwarz RN  Outcome: Progressing

## 2023-01-11 NOTE — FLOWSHEET NOTE
Pt moved to [facility] via bed accompanied by family and babies. Pt oriented to room and call system.

## 2023-01-11 NOTE — LACTATION NOTE
This note was copied from a baby's chart.  -initial lactation visit  -mom in recovery s/p c-sec, twins delivered at 35 weeks gestation  -both babies at breast upon entering room  -Baby A latched to right breast and Baby B to left, both in cradle hold  -rhythmic sucking noted by both babies  -repositioned baby B slightly to obtain deeper latch  -mom reports prior breastfeeding experience  -provided with written breastfeeding education materials and reviewed  -counseled on feeding cues and benefits of skin to skin  -provided with double electric hospital grade breastpump and info re: use, care and cleaning  -recommend to pump after every other feed for increased breast stimulation  -will monitor blood sugars, weights and diapers  -offered support and encouraged to call for feeds  -mom verbalized understanding of teaching  - team will continue to follow    1330-follow up  -mom reports both babies  within the hour  -accu-checks were WNL  -denies latch difficulty or pain with feeds  -offered support and encouragement

## 2023-01-11 NOTE — PLAN OF CARE
Problem: Pain  Goal: Verbalizes/displays adequate comfort level or baseline comfort level  2023 1458 by Rosita Charles RN  Outcome: Progressing  2023 0735 by Rosita Charles RN  Outcome: Progressing     Problem: Postpartum  Goal: Experiences normal postpartum course  Description:  Postpartum OB-Pregnancy care plan goal which identifies if the mother is experiencing a normal postpartum course  Outcome: Progressing  Goal: Appropriate maternal -  bonding  Description:  Postpartum OB-Pregnancy care plan goal which identifies if the mother and  are bonding appropriately  Outcome: Progressing  Goal: Establishment of infant feeding pattern  Description:  Postpartum OB-Pregnancy care plan goal which identifies if the mother is establishing a feeding pattern with their   Outcome: Progressing  Goal: Incisions, wounds, or drain sites healing without S/S of infection  Outcome: Progressing     Problem: Infection - Adult  Goal: Absence of infection at discharge  Outcome: Progressing  Goal: Absence of infection during hospitalization  Outcome: Progressing  Goal: Absence of fever/infection during anticipated neutropenic period  Outcome: Progressing     Problem: Safety - Adult  Goal: Free from fall injury  Outcome: Progressing     Problem: Discharge Planning  Goal: Discharge to home or other facility with appropriate resources  Outcome: Progressing

## 2023-01-12 LAB
ALBUMIN SERPL-MCNC: 2.7 G/DL (ref 3.5–4.6)
ALP BLD-CCNC: 108 U/L (ref 40–130)
ALT SERPL-CCNC: 8 U/L (ref 0–33)
ANION GAP SERPL CALCULATED.3IONS-SCNC: 7 MEQ/L (ref 9–15)
AST SERPL-CCNC: 18 U/L (ref 0–35)
BASOPHILS ABSOLUTE: 0.1 K/UL (ref 0–0.2)
BASOPHILS RELATIVE PERCENT: 0.6 %
BILIRUB SERPL-MCNC: <0.2 MG/DL (ref 0.2–0.7)
BUN BLDV-MCNC: 14 MG/DL (ref 6–20)
CALCIUM SERPL-MCNC: 8.5 MG/DL (ref 8.5–9.9)
CHLORIDE BLD-SCNC: 105 MEQ/L (ref 95–107)
CO2: 23 MEQ/L (ref 20–31)
CREAT SERPL-MCNC: 0.74 MG/DL (ref 0.5–0.9)
EOSINOPHILS ABSOLUTE: 0 K/UL (ref 0–0.7)
EOSINOPHILS RELATIVE PERCENT: 0 %
GFR SERPL CREATININE-BSD FRML MDRD: >60 ML/MIN/{1.73_M2}
GLOBULIN: 2.1 G/DL (ref 2.3–3.5)
GLUCOSE BLD-MCNC: 89 MG/DL (ref 70–99)
GROUP B STREP CULTURE: NORMAL
HCT VFR BLD CALC: 24.2 % (ref 37–47)
HEMOGLOBIN: 7.7 G/DL (ref 12–16)
LYMPHOCYTES ABSOLUTE: 2.2 K/UL (ref 1–4.8)
LYMPHOCYTES RELATIVE PERCENT: 22.4 %
MCH RBC QN AUTO: 27.5 PG (ref 27–31.3)
MCHC RBC AUTO-ENTMCNC: 31.8 % (ref 33–37)
MCV RBC AUTO: 86.7 FL (ref 79.4–94.8)
MONOCYTES ABSOLUTE: 0.9 K/UL (ref 0.2–0.8)
MONOCYTES RELATIVE PERCENT: 9.1 %
NEUTROPHILS ABSOLUTE: 6.6 K/UL (ref 1.4–6.5)
NEUTROPHILS RELATIVE PERCENT: 67.9 %
PDW BLD-RTO: 18.7 % (ref 11.5–14.5)
PLATELET # BLD: 191 K/UL (ref 130–400)
POTASSIUM SERPL-SCNC: 4.5 MEQ/L (ref 3.4–4.9)
RBC # BLD: 2.79 M/UL (ref 4.2–5.4)
SODIUM BLD-SCNC: 135 MEQ/L (ref 135–144)
TOTAL PROTEIN: 4.8 G/DL (ref 6.3–8)
WBC # BLD: 9.7 K/UL (ref 4.8–10.8)

## 2023-01-12 PROCEDURE — 2580000003 HC RX 258: Performed by: OBSTETRICS & GYNECOLOGY

## 2023-01-12 PROCEDURE — 80053 COMPREHEN METABOLIC PANEL: CPT

## 2023-01-12 PROCEDURE — 1220000000 HC SEMI PRIVATE OB R&B

## 2023-01-12 PROCEDURE — 6370000000 HC RX 637 (ALT 250 FOR IP): Performed by: OBSTETRICS & GYNECOLOGY

## 2023-01-12 PROCEDURE — 85025 COMPLETE CBC W/AUTO DIFF WBC: CPT

## 2023-01-12 PROCEDURE — 36415 COLL VENOUS BLD VENIPUNCTURE: CPT

## 2023-01-12 PROCEDURE — 6360000002 HC RX W HCPCS: Performed by: OBSTETRICS & GYNECOLOGY

## 2023-01-12 RX ADMIN — DOCUSATE SODIUM 100 MG: 100 CAPSULE, LIQUID FILLED ORAL at 09:19

## 2023-01-12 RX ADMIN — KETOROLAC TROMETHAMINE 30 MG: 30 INJECTION, SOLUTION INTRAMUSCULAR; INTRAVENOUS at 15:21

## 2023-01-12 RX ADMIN — IRON SUCROSE 200 MG: 20 INJECTION, SOLUTION INTRAVENOUS at 09:26

## 2023-01-12 RX ADMIN — KETOROLAC TROMETHAMINE 30 MG: 30 INJECTION, SOLUTION INTRAMUSCULAR; INTRAVENOUS at 02:38

## 2023-01-12 RX ADMIN — HYDROCODONE BITARTRATE AND ACETAMINOPHEN 2 TABLET: 5; 325 TABLET ORAL at 19:53

## 2023-01-12 RX ADMIN — PRENATAL VIT W/ FE FUMARATE-FA TAB 27-0.8 MG 1 TABLET: 27-0.8 TAB at 09:19

## 2023-01-12 RX ADMIN — KETOROLAC TROMETHAMINE 30 MG: 30 INJECTION, SOLUTION INTRAMUSCULAR; INTRAVENOUS at 09:19

## 2023-01-12 RX ADMIN — HYDROCODONE BITARTRATE AND ACETAMINOPHEN 2 TABLET: 5; 325 TABLET ORAL at 00:24

## 2023-01-12 ASSESSMENT — PAIN DESCRIPTION - LOCATION
LOCATION: ABDOMEN
LOCATION: ABDOMEN;INCISION
LOCATION: ABDOMEN;INCISION
LOCATION: ABDOMEN;HEAD
LOCATION: ABDOMEN

## 2023-01-12 ASSESSMENT — PAIN SCALES - GENERAL
PAINLEVEL_OUTOF10: 7
PAINLEVEL_OUTOF10: 5
PAINLEVEL_OUTOF10: 5
PAINLEVEL_OUTOF10: 6
PAINLEVEL_OUTOF10: 6
PAINLEVEL_OUTOF10: 3
PAINLEVEL_OUTOF10: 0
PAINLEVEL_OUTOF10: 2
PAINLEVEL_OUTOF10: 3

## 2023-01-12 ASSESSMENT — PAIN DESCRIPTION - DESCRIPTORS
DESCRIPTORS: ACHING;CRAMPING
DESCRIPTORS: CRAMPING
DESCRIPTORS: CRAMPING
DESCRIPTORS: CRAMPING;SORE
DESCRIPTORS: CRAMPING;ACHING

## 2023-01-12 ASSESSMENT — PAIN DESCRIPTION - ORIENTATION
ORIENTATION: LOWER
ORIENTATION: ANTERIOR
ORIENTATION: LOWER
ORIENTATION: ANTERIOR

## 2023-01-12 NOTE — PROGRESS NOTES
PROGRESS NOTE:  POSTOP DAY 1    Pt doing well. Pt ambulating, tolerating po and voiding without issue. Pt bottle feeding without issue. Pt with postpartum anemia. /87   Pulse 61   Temp 98.1 °F (36.7 °C) (Oral)   Resp 16   LMP 2022   SpO2 99%   Breastfeeding Unknown   Hemoglobin   Date Value Ref Range Status   2023 7.7 (L) 12.0 - 16.0 g/dL Final     CVS: RRR  Lungs: CTAB  ABD: soft, NT, uterus firm at umbilicus  Pfannenstiel incision without erythema  EXT: no c/c/e    33 y.o. [id number] now P3 s/p  delivery doing well POD#1. 1. Routine postop care   2. May discharge to home when infant released  3. Rx percocet and Ibuprofen  4. F/u with Dr. Ronnie Whyte in 2wks  5.  Loni Owens MD

## 2023-01-12 NOTE — LACTATION NOTE
This note was copied from a baby's chart.  -initial lactation visit  -mom in recovery s/p c-sec, twins delivered at 28 weeks gestation  -both babies at breast upon entering room  -Baby A latched to right breast and Baby B to left, both in cradle hold  -rhythmic sucking noted by both babies  -repositioned baby B slightly to obtain deeper latch  -mom reports prior breastfeeding experience  -provided with written breastfeeding education materials and reviewed  -counseled on feeding cues and benefits of skin to skin  -provided with double electric hospital grade breastpump and info re: use, care and cleaning  -recommend to pump after every other feed for increased breast stimulation  -will monitor blood sugars, weights and diapers  -offered support and encouraged to call for feeds  -mom verbalized understanding of teaching  -[facility] team will continue to follow

## 2023-01-12 NOTE — PLAN OF CARE
Problem: Pain  Goal: Verbalizes/displays adequate comfort level or baseline comfort level  2023 by Lizbet Quiñones RN  Outcome: Progressing  2023 1458 by Abdulkadir Mullins RN  Outcome: Progressing     Problem: Postpartum  Goal: Experiences normal postpartum course  Description:  Postpartum OB-Pregnancy care plan goal which identifies if the mother is experiencing a normal postpartum course  2023 by Lizbet Quiñones RN  Outcome: Progressing  2023 1458 by Abdulkadir Mullins RN  Outcome: Progressing  Goal: Appropriate maternal -  bonding  Description:  Postpartum OB-Pregnancy care plan goal which identifies if the mother and  are bonding appropriately  2023 by Lizbet Quiñones RN  Outcome: Progressing  2023 1458 by Abdulkadir Mullins RN  Outcome: Progressing  Goal: Establishment of infant feeding pattern  Description:  Postpartum OB-Pregnancy care plan goal which identifies if the mother is establishing a feeding pattern with their   2023 by Lizbet Quiñones RN  Outcome: Progressing  2023 1458 by Abdulkadir Mullins RN  Outcome: Progressing  Goal: Incisions, wounds, or drain sites healing without S/S of infection  2023 by Lizbet Quiñones RN  Outcome: Progressing  2023 1458 by Abdulkadir Mullins RN  Outcome: Progressing     Problem: Safety - Adult  Goal: Free from fall injury  2023 by Lizbet Quiñones RN  Outcome: Progressing  2023 1458 by Abdulkadir Mullins RN  Outcome: Progressing     Problem: Discharge Planning  Goal: Discharge to home or other facility with appropriate resources  2023 by Lizbet Quiñones RN  Outcome: Progressing  2023 1458 by Abdulkadir Mullins RN  Outcome: Progressing

## 2023-01-12 NOTE — FLOWSHEET NOTE
Dr. Lei Tobin notified of patients blood pressure and that procardia was held. Per Dr. Lei Tobin if patients blood pressure is less than 140/90 please hold medication.

## 2023-01-13 PROCEDURE — S9443 LACTATION CLASS: HCPCS

## 2023-01-13 PROCEDURE — 6370000000 HC RX 637 (ALT 250 FOR IP): Performed by: OBSTETRICS & GYNECOLOGY

## 2023-01-13 PROCEDURE — 2580000003 HC RX 258: Performed by: OBSTETRICS & GYNECOLOGY

## 2023-01-13 PROCEDURE — 6360000002 HC RX W HCPCS: Performed by: OBSTETRICS & GYNECOLOGY

## 2023-01-13 PROCEDURE — 1220000000 HC SEMI PRIVATE OB R&B

## 2023-01-13 RX ORDER — SODIUM CHLORIDE, SODIUM LACTATE, POTASSIUM CHLORIDE, AND CALCIUM CHLORIDE .6; .31; .03; .02 G/100ML; G/100ML; G/100ML; G/100ML
500 INJECTION, SOLUTION INTRAVENOUS ONCE
Status: COMPLETED | OUTPATIENT
Start: 2023-01-13 | End: 2023-01-13

## 2023-01-13 RX ORDER — FERROUS SULFATE 325(65) MG
325 TABLET ORAL 2 TIMES DAILY WITH MEALS
Status: DISCONTINUED | OUTPATIENT
Start: 2023-01-13 | End: 2023-01-14 | Stop reason: HOSPADM

## 2023-01-13 RX ORDER — CYCLOBENZAPRINE HCL 5 MG
10 TABLET ORAL 3 TIMES DAILY PRN
Status: DISCONTINUED | OUTPATIENT
Start: 2023-01-13 | End: 2023-01-14 | Stop reason: HOSPADM

## 2023-01-13 RX ORDER — BUTALBITAL, ACETAMINOPHEN AND CAFFEINE 300; 40; 50 MG/1; MG/1; MG/1
1 CAPSULE ORAL EVERY 4 HOURS PRN
Status: DISCONTINUED | OUTPATIENT
Start: 2023-01-13 | End: 2023-01-14 | Stop reason: HOSPADM

## 2023-01-13 RX ORDER — DIPHENHYDRAMINE HYDROCHLORIDE 50 MG/ML
12.5 INJECTION INTRAMUSCULAR; INTRAVENOUS ONCE
Status: COMPLETED | OUTPATIENT
Start: 2023-01-13 | End: 2023-01-13

## 2023-01-13 RX ADMIN — FERROUS SULFATE TAB 325 MG (65 MG ELEMENTAL FE) 325 MG: 325 (65 FE) TAB at 11:15

## 2023-01-13 RX ADMIN — NIFEDIPINE 30 MG: 30 TABLET, EXTENDED RELEASE ORAL at 10:18

## 2023-01-13 RX ADMIN — HYDROCODONE BITARTRATE AND ACETAMINOPHEN 2 TABLET: 5; 325 TABLET ORAL at 16:32

## 2023-01-13 RX ADMIN — ACETAMINOPHEN 1000 MG: 500 TABLET ORAL at 21:10

## 2023-01-13 RX ADMIN — KETOROLAC TROMETHAMINE 30 MG: 30 INJECTION, SOLUTION INTRAMUSCULAR; INTRAVENOUS at 00:21

## 2023-01-13 RX ADMIN — DIPHENHYDRAMINE HYDROCHLORIDE 12.5 MG: 50 INJECTION, SOLUTION INTRAMUSCULAR; INTRAVENOUS at 18:03

## 2023-01-13 RX ADMIN — HYDROCODONE BITARTRATE AND ACETAMINOPHEN 2 TABLET: 5; 325 TABLET ORAL at 10:19

## 2023-01-13 RX ADMIN — DOCUSATE SODIUM 100 MG: 100 CAPSULE, LIQUID FILLED ORAL at 10:18

## 2023-01-13 RX ADMIN — IBUPROFEN 600 MG: 600 TABLET, FILM COATED ORAL at 10:18

## 2023-01-13 RX ADMIN — IBUPROFEN 600 MG: 600 TABLET, FILM COATED ORAL at 22:40

## 2023-01-13 RX ADMIN — BUTALBITA,ACETAMINOPHEN AND CAFFEINE 1 CAPSULE: 50; 300; 40 CAPSULE ORAL at 18:03

## 2023-01-13 RX ADMIN — HYDROCODONE BITARTRATE AND ACETAMINOPHEN 2 TABLET: 5; 325 TABLET ORAL at 22:40

## 2023-01-13 RX ADMIN — SODIUM CHLORIDE, POTASSIUM CHLORIDE, SODIUM LACTATE AND CALCIUM CHLORIDE 500 ML: 600; 310; 30; 20 INJECTION, SOLUTION INTRAVENOUS at 18:01

## 2023-01-13 RX ADMIN — FERROUS SULFATE TAB 325 MG (65 MG ELEMENTAL FE) 325 MG: 325 (65 FE) TAB at 18:03

## 2023-01-13 RX ADMIN — IBUPROFEN 600 MG: 600 TABLET, FILM COATED ORAL at 16:32

## 2023-01-13 RX ADMIN — PRENATAL VIT W/ FE FUMARATE-FA TAB 27-0.8 MG 1 TABLET: 27-0.8 TAB at 10:18

## 2023-01-13 RX ADMIN — ONDANSETRON 4 MG: 2 INJECTION INTRAMUSCULAR; INTRAVENOUS at 22:51

## 2023-01-13 ASSESSMENT — PAIN SCALES - GENERAL
PAINLEVEL_OUTOF10: 6
PAINLEVEL_OUTOF10: 8
PAINLEVEL_OUTOF10: 8
PAINLEVEL_OUTOF10: 7
PAINLEVEL_OUTOF10: 6
PAINLEVEL_OUTOF10: 8

## 2023-01-13 ASSESSMENT — PAIN DESCRIPTION - LOCATION
LOCATION: ABDOMEN
LOCATION: NECK;HEAD
LOCATION: ABDOMEN;INCISION
LOCATION: HEAD;NECK
LOCATION: ABDOMEN;INCISION

## 2023-01-13 ASSESSMENT — PAIN DESCRIPTION - DESCRIPTORS
DESCRIPTORS: BURNING;ACHING
DESCRIPTORS: BURNING
DESCRIPTORS: ACHING;DISCOMFORT;BURNING
DESCRIPTORS: ACHING

## 2023-01-13 NOTE — DISCHARGE INSTRUCTIONS
Breast feeding support group meets every Thursday here on maternity at 11:00 am    [email or web address] is a good resource about breast feeding

## 2023-01-13 NOTE — PROGRESS NOTES
Department of Obstetrics and Gynecology  Labor and Delivery  Post Partum Progress Note      SUBJECTIVE:  Called to see patient secondary to neck pain, with pain radiating up the back to neck, over the head and ending behind the eyes. She also reports recent nose bleed. She has history of migraines and admits to nose bleeds during pregnancy. She admits that this headache is not as bad as migraines but she is sensitive to light currently. She also reports very little sleep over the last 48 hours as she is exclusively breast feeding the twins. OBJECTIVE:      Vitals:  /84   Pulse (!) 106   Temp 97.9 °F (36.6 °C) (Oral)   Resp 18   LMP 05/06/2022   SpO2 94%   Breastfeeding Unknown         DATA:        ASSESSMENT & PLAN:      Assessment:   POD#2 s/p Repeat LTCS with BS  Iron Deficiency Anemia  Migraines exacerbated by Fatigue    Plan:   Treat with IV Bolus 500 cc LR  IV Benadryl 12.5 mg x 1   Po Fioricet  Recommend 3 hour nap while twins are undergoing car seat challenges. Will continue to monitor for improvement.

## 2023-01-13 NOTE — PLAN OF CARE
Problem: Pain  Goal: Verbalizes/displays adequate comfort level or baseline comfort level  2023 09 by Wang Abebe RN  Outcome: Progressing  2023 06 by Jerene Oppenheim, RN  Outcome: Progressing     Problem: Postpartum  Goal: Experiences normal postpartum course  Description:  Postpartum OB-Pregnancy care plan goal which identifies if the mother is experiencing a normal postpartum course  2023 0938 by Wang Abebe RN  Outcome: Progressing  2023 06 by Jerene Oppenheim, RN  Outcome: Progressing  Goal: Appropriate maternal -  bonding  Description:  Postpartum OB-Pregnancy care plan goal which identifies if the mother and  are bonding appropriately  2023 0938 by Wang Abebe RN  Outcome: Progressing  2023 by Jerene Oppenheim, RN  Outcome: Progressing  Goal: Establishment of infant feeding pattern  Description:  Postpartum OB-Pregnancy care plan goal which identifies if the mother is establishing a feeding pattern with their   2023 [id number] by Wang Abebe RN  Outcome: Progressing  2023 0607 by Jerene Oppenheim, RN  Outcome: Progressing  Goal: Incisions, wounds, or drain sites healing without S/S of infection  2023 by Wang Abebe RN  Outcome: Progressing  2023 by Jerene Oppenheim, RN  Outcome: Progressing     Problem: Infection - Adult  Goal: Absence of infection at discharge  2023 by Wang Abebe RN  Outcome: Progressing  [id number] by Jerene Oppenheim, RN  Outcome: Progressing  Goal: Absence of infection during hospitalization  2023 0938 by Wang Abebe RN  Outcome: Progressing  2023 06Juan by Jerene Oppenheim, RN  Outcome: Progressing  Goal: Absence of fever/infection during anticipated neutropenic period  2023 09 by Wang Abebe RN  Outcome: Progressing  202307 by Jerene Oppenheim, RN  Outcome: Progressing     Problem: Infection - Adult  Goal: Absence of infection at discharge  1/13/2023 0938 by Kiesha Thakur RN  Outcome: Progressing  1/13/2023 0607 by Stan Rodriges RN  Outcome: Progressing  Goal: Absence of infection during hospitalization  1/13/2023 0938 by Kiesha Thakur RN  Outcome: Progressing  1/13/2023 0607 by Stan Rodriges RN  Outcome: Progressing  Goal: Absence of fever/infection during anticipated neutropenic period  1/13/2023 0938 by Kiesha Thakur RN  Outcome: Progressing  1/13/2023 0607 by Stan Rodriges RN  Outcome: Progressing     Problem: Safety - Adult  Goal: Free from fall injury  1/13/2023 0938 by Kiesha Thakur RN  Outcome: Progressing  1/13/2023 0607 by Stan Rodriges RN  Outcome: Progressing     Problem: Discharge Planning  Goal: Discharge to home or other facility with appropriate resources  1/13/2023 0938 by Kiesha Thakur RN  Outcome: Progressing  1/13/2023 0607 by Stan Rodriges RN  Outcome: Progressing

## 2023-01-13 NOTE — DISCHARGE SUMMARY
DISCHARGE SUMMARY: POST OP DAY #2    Pt doing well. Pt ambulating, tolerating po and voiding without issue. Pt bottle feeding without issue. Pt eager for discharge. /70   Pulse 68   Temp 97.6 °F (36.4 °C) (Oral)   Resp 18   LMP 2022   SpO2 95%   Breastfeeding Unknown   Hemoglobin   Date Value Ref Range Status   2023 7.7 (L) 12.0 - 16.0 g/dL Final     CVS: RRR  Lungs: CTAB  ABD: soft, NT, uterus firm at umbilicus  Pfannenstiel incision without erythema  EXT: no c/c/e    33 y.o. [id number] now P3 s/p  delivery doing well POD#2.   1. Discharge pt to home when infant released  2. Rx Percocet, Ibuprofen  3.  Pt to f/u with Dr. Andrew Cordero in 2wks    William Simmons MD

## 2023-01-13 NOTE — LACTATION NOTE
In to visit pt   Mother holding infants  Mother states infants are sleepy  Encouraged to undress them and stimulate them awake  Encouraged mother to breast feed every 2-3 hours for 10-20 minutes per breast  Informed mother she will need to return for an out pt lactation consult    56  In to visit pt  Mother states she fed infants at 21   Encouraged to call with the next feed    1339  Mother attempting to breast feed Baby A  Infant latched and sucking  Infant needed stimulation to remain awake and sucking   Undressed infant infant sleeping  Mother states infant nursed for 9 minutes    Infant B to left  breast with football hold  Encouraged to latch infant deeply  Infant latched and sucking   Breast milk spilling from corner of mouth while sucking  Infant sleepy  Encouraged mother to undress infant and stimulate awake    36  Requisition for out patient lactation consult given to pt  Mother and babies are returning Monday 1/16/23 at 1300

## 2023-01-13 NOTE — FLOWSHEET NOTE
Patient given abdominal binder and assisted back to bed from bathroom. Patient complaints of burning at the incision. Incision clean, dry, intact. Patient given toradol via IV. Fundal check firm at U, scant bleeding. Will continue to monitor.

## 2023-01-14 VITALS
HEART RATE: 83 BPM | DIASTOLIC BLOOD PRESSURE: 79 MMHG | SYSTOLIC BLOOD PRESSURE: 122 MMHG | RESPIRATION RATE: 16 BRPM | TEMPERATURE: 97.7 F | OXYGEN SATURATION: 98 %

## 2023-01-14 PROCEDURE — 99024 POSTOP FOLLOW-UP VISIT: CPT | Performed by: OBSTETRICS & GYNECOLOGY

## 2023-01-14 PROCEDURE — 6370000000 HC RX 637 (ALT 250 FOR IP): Performed by: OBSTETRICS & GYNECOLOGY

## 2023-01-14 RX ADMIN — DOCUSATE SODIUM 100 MG: 100 CAPSULE, LIQUID FILLED ORAL at 08:42

## 2023-01-14 RX ADMIN — IBUPROFEN 600 MG: 600 TABLET, FILM COATED ORAL at 08:41

## 2023-01-14 RX ADMIN — NIFEDIPINE 30 MG: 30 TABLET, EXTENDED RELEASE ORAL at 08:41

## 2023-01-14 RX ADMIN — FERROUS SULFATE TAB 325 MG (65 MG ELEMENTAL FE) 325 MG: 325 (65 FE) TAB at 08:41

## 2023-01-14 RX ADMIN — HYDROCODONE BITARTRATE AND ACETAMINOPHEN 2 TABLET: 5; 325 TABLET ORAL at 08:42

## 2023-01-14 RX ADMIN — PRENATAL VIT W/ FE FUMARATE-FA TAB 27-0.8 MG 1 TABLET: 27-0.8 TAB at 08:41

## 2023-01-14 RX ADMIN — CYCLOBENZAPRINE HYDROCHLORIDE 10 MG: 5 TABLET, FILM COATED ORAL at 08:42

## 2023-01-14 ASSESSMENT — PAIN SCALES - GENERAL: PAINLEVEL_OUTOF10: 6

## 2023-01-14 ASSESSMENT — PAIN DESCRIPTION - LOCATION: LOCATION: ABDOMEN;HEAD

## 2023-01-14 NOTE — LACTATION NOTE
This note was copied from a baby's chart. In to see mom and babies about breastfeeding. Both babies quiet/alert at this time. Assisted mom in latching both babies on in football hold. Good latch with rhythmic sucking and audible swallowing noted. Mom denies any pain with latch. Shown mom how to use breast compression to keep infants interested in feeding. Also show techniques to keep babies awake during feed. Mom denies any questions or concerns at this time. States that she has follow lactation and pediatrician appointments already scheduled. Mom encouraged to call lactation as needed for any issues.

## 2023-01-14 NOTE — PLAN OF CARE
Problem: Pain  Goal: Verbalizes/displays adequate comfort level or baseline comfort level  2023 by Vivian Quiros RN  Outcome: Completed  2023 by Smith Arteaga RN  Outcome: Progressing     Problem: Postpartum  Goal: Experiences normal postpartum course  Description:  Postpartum OB-Pregnancy care plan goal which identifies if the mother is experiencing a normal postpartum course  2023 by Vivian Quiros RN  Outcome: Completed  2023 by Smith Arteaga RN  Outcome: Progressing  Goal: Appropriate maternal -  bonding  Description:  Postpartum OB-Pregnancy care plan goal which identifies if the mother and  are bonding appropriately  2023 by Vivian Quiros RN  Outcome: Completed  2023 by Smiht Arteaga RN  Outcome: Progressing  Goal: Establishment of infant feeding pattern  Description:  Postpartum OB-Pregnancy care plan goal which identifies if the mother is establishing a feeding pattern with their   2023 by Vivian Quiros RN  Outcome: Completed  2023 by Smith Arteaga RN  Outcome: Progressing  Goal: Incisions, wounds, or drain sites healing without S/S of infection  2023 by Vivian Quiros RN  Outcome: Completed  2023 by Smith Arteaga RN  Outcome: Progressing     Problem: Infection - Adult  Goal: Absence of infection at discharge  2023 by Vivian Quiros RN  Outcome: Completed  2023 by Smith Arteaga RN  Outcome: Progressing  Goal: Absence of infection during hospitalization  2023 by Vivian Quiros RN  Outcome: Completed  2023 by Smith Arteaga RN  Outcome: Progressing  Goal: Absence of fever/infection during anticipated neutropenic period  2023 by Vivian Quiros RN  Outcome: Completed  2023 by Smith Arteaga RN  Outcome: Progressing     Problem: Safety - Adult  Goal: Free from fall injury  1/14/2023 0912 by Jason Rios RN  Outcome: Completed  1/13/2023 2343 by Chinedu Prescott RN  Outcome: Progressing     Problem: Discharge Planning  Goal: Discharge to home or other facility with appropriate resources  1/14/2023 0912 by Jason Rios RN  Outcome: Completed  1/13/2023 2343 by Chinedu Prescott RN  Outcome: Progressing

## 2023-01-14 NOTE — PROGRESS NOTES
Department of Obstetrics and Gynecology  Labor and Delivery  Attending Post Partum Progress Note      SUBJECTIVE:  pt doing well. Pain controlled with motrin and norco.  Desires d/c home. OBJECTIVE:      Vitals:  /79   Pulse 83   Temp 97.7 °F (36.5 °C) (Oral)   Resp 16   LMP 05/06/2022   SpO2 98%   Breastfeeding Unknown     CONSTITUTIONAL:  awake, alert, cooperative, and no apparent distress  ABDOMEN:  bandage dry, mildly distended, and non-tender, fundus firm below umbilicus   MUSCULOSKELETAL:  There is no redness, warmth, or swelling of the joints. Full range of motion noted. Motor strength is 5 out of 5 all extremities bilaterally.   Tone is normal.      ASSESSMENT & PLAN:      PP visit, pod#3 s/p PCS, twins  Doing well  Pain mgmt  D/c home  F/u in office in 1 week

## 2023-01-19 ENCOUNTER — OFFICE VISIT (OUTPATIENT)
Dept: OBGYN CLINIC | Age: 28
End: 2023-01-19

## 2023-01-19 VITALS
HEIGHT: 63 IN | SYSTOLIC BLOOD PRESSURE: 132 MMHG | WEIGHT: 124 LBS | DIASTOLIC BLOOD PRESSURE: 86 MMHG | BODY MASS INDEX: 21.97 KG/M2

## 2023-01-19 ASSESSMENT — PATIENT HEALTH QUESTIONNAIRE - PHQ9
SUM OF ALL RESPONSES TO PHQ9 QUESTIONS 1 & 2: 0
SUM OF ALL RESPONSES TO PHQ QUESTIONS 1-9: 0
1. LITTLE INTEREST OR PLEASURE IN DOING THINGS: 0
2. FEELING DOWN, DEPRESSED OR HOPELESS: 0
SUM OF ALL RESPONSES TO PHQ QUESTIONS 1-9: 0

## 2023-01-19 NOTE — PROGRESS NOTES
SUBJECTIVE:   33 y.o. [id number] here for post operative exam. Pt underwent LTCD with BS for twin gestation post op 1wks. Pt breast feeding without difficulty. Pt with irregular VB since delivery. Review of Systems:  General ROS: negative  Psychological ROS: negative  ENT ROS: negative  Endocrine ROS: negative  Respiratory ROS: no cough, shortness of breath, or wheezing  Cardiovascular ROS: no chest pain or dyspnea on exertion  Gastrointestinal ROS: no abdominal pain, change in bowel habits, or black or bloody stools  Genito-Urinary ROS: no dysuria, trouble voiding, or hematuria  Musculoskeletal ROS: negative  Neurological ROS: no TIA or stroke symptoms  Dermatological ROS: negative    OBJECTIVE:   Physical Exam:  GEN: She appears well, afebrile. HEENT: normal cephalic, atraumatic  CVS: regular rate and rhythm  ABDOMEN: benign, soft, nontender, no masses. Pfannenstiel incision, no erythema or induration  MUSCULOSKELETAL: normal gait, no masses  SKIN: normal texture and tone, no lesions  NEURO: normal tone, no hyperreflexia, 1+DTRs throughout    ASSESSMENT:   Post operative wound check - LTCS    PLAN:   Post operative precautions reviewed. Pt to continue pelvic rest and lifting precautions. Follow up for postpartum exam in 4wks.

## 2023-02-10 ENCOUNTER — OFFICE VISIT (OUTPATIENT)
Dept: OBGYN CLINIC | Age: 28
End: 2023-02-10
Payer: MEDICAID

## 2023-02-10 VITALS
DIASTOLIC BLOOD PRESSURE: 68 MMHG | WEIGHT: 115 LBS | BODY MASS INDEX: 20.38 KG/M2 | HEART RATE: 134 BPM | TEMPERATURE: 98.9 F | SYSTOLIC BLOOD PRESSURE: 126 MMHG | HEIGHT: 63 IN

## 2023-02-10 DIAGNOSIS — N61.0 MASTITIS, LEFT, ACUTE: Primary | ICD-10-CM

## 2023-02-10 PROBLEM — Z78.9 ADMITTED TO LABOR AND DELIVERY: Status: RESOLVED | Noted: 2023-01-09 | Resolved: 2023-02-10

## 2023-02-10 PROBLEM — O30.033 MONOCHORIONIC DIAMNIOTIC TWIN GESTATION IN THIRD TRIMESTER: Status: RESOLVED | Noted: 2022-08-31 | Resolved: 2023-02-10

## 2023-02-10 PROBLEM — O99.280 DEHYDRATION DURING PREGNANCY: Status: RESOLVED | Noted: 2022-12-14 | Resolved: 2023-02-10

## 2023-02-10 PROBLEM — E86.0 DEHYDRATION DURING PREGNANCY: Status: RESOLVED | Noted: 2022-12-14 | Resolved: 2023-02-10

## 2023-02-10 PROBLEM — O12.13 PROTEINURIA AFFECTING PREGNANCY IN THIRD TRIMESTER: Status: RESOLVED | Noted: 2023-01-11 | Resolved: 2023-02-10

## 2023-02-10 PROCEDURE — 99214 OFFICE O/P EST MOD 30 MIN: CPT | Performed by: ADVANCED PRACTICE MIDWIFE

## 2023-02-10 PROCEDURE — 1036F TOBACCO NON-USER: CPT | Performed by: ADVANCED PRACTICE MIDWIFE

## 2023-02-10 PROCEDURE — 1111F DSCHRG MED/CURRENT MED MERGE: CPT | Performed by: ADVANCED PRACTICE MIDWIFE

## 2023-02-10 PROCEDURE — G8427 DOCREV CUR MEDS BY ELIG CLIN: HCPCS | Performed by: ADVANCED PRACTICE MIDWIFE

## 2023-02-10 PROCEDURE — G8420 CALC BMI NORM PARAMETERS: HCPCS | Performed by: ADVANCED PRACTICE MIDWIFE

## 2023-02-10 PROCEDURE — G8484 FLU IMMUNIZE NO ADMIN: HCPCS | Performed by: ADVANCED PRACTICE MIDWIFE

## 2023-02-10 RX ORDER — CEPHALEXIN 500 MG/1
500 CAPSULE ORAL 4 TIMES DAILY
Qty: 40 CAPSULE | Refills: 0 | Status: SHIPPED | OUTPATIENT
Start: 2023-02-10 | End: 2023-02-20

## 2023-02-10 RX ORDER — OXYCODONE HYDROCHLORIDE AND ACETAMINOPHEN 5; 325 MG/1; MG/1
TABLET ORAL
COMMUNITY
Start: 2023-01-23

## 2023-02-10 RX ORDER — PEDIATRIC MULTIPLE VITAMINS W/ IRON DROPS 10 MG/ML 10 MG/ML
SOLUTION ORAL
COMMUNITY
Start: 2023-01-23

## 2023-02-10 ASSESSMENT — ENCOUNTER SYMPTOMS
CONSTIPATION: 0
NAUSEA: 0
DIARRHEA: 0
ABDOMINAL PAIN: 0
SHORTNESS OF BREATH: 0
COUGH: 0
VOMITING: 0

## 2023-02-10 NOTE — PROGRESS NOTES
SUBJECTIVE:  Zelda Wilcox is a 33 y.o. female who presents here today for complaints of:      Chief Complaint   Patient presents with    Breast Pain     Body aches and chills as well as a lot of nausea, painful and tender breasts. Mastitis Left Breast  New onset of left breast pain with body aches, and chills. Symptoms began about 16 hours ago and have rapidly worsened. She is breastfeeding her twins, they do continue to breastfeed well. Encouraged to continue feeding, keep the breast as empty as possible, pump between feedings if needed. Review of Systems   Constitutional:  Positive for activity change, chills and fatigue. Respiratory:  Negative for cough and shortness of breath. Gastrointestinal:  Negative for abdominal pain, constipation, diarrhea, nausea and vomiting. Genitourinary:  Negative for difficulty urinating, dysuria, menstrual problem, pelvic pain, vaginal bleeding and vaginal discharge. All other systems reviewed and are negative. OBJECTIVE:  Vitals:  /68   Pulse (!) 134   Temp 98.9 °F (37.2 °C)   Ht 5' 3\" (1.6 m)   Wt 115 lb (52.2 kg)   LMP 05/06/2022   Breastfeeding Yes   BMI 20.37 kg/m²     Physical Exam  Constitutional:       General: She is not in acute distress. Appearance: Normal appearance. She is not ill-appearing. HENT:      Mouth/Throat:      Mouth: Mucous membranes are moist.   Eyes:      General: No scleral icterus. Right eye: No discharge. Left eye: No discharge. Cardiovascular:      Rate and Rhythm: Normal rate. Pulmonary:      Effort: Pulmonary effort is normal. No respiratory distress. Chest:   Breasts:     Breasts are symmetrical.      Right: Normal. No swelling, bleeding, inverted nipple, mass, nipple discharge, skin change or tenderness. Left: Tenderness present. No swelling, bleeding, inverted nipple, mass, nipple discharge or skin change. Abdominal:      Palpations: Abdomen is soft.    Musculoskeletal: General: Normal range of motion. Cervical back: Normal range of motion and neck supple. Right lower leg: No edema. Left lower leg: No edema. Lymphadenopathy:      Upper Body:      Right upper body: No supraclavicular, axillary or pectoral adenopathy. Left upper body: No supraclavicular, axillary or pectoral adenopathy. Skin:     General: Skin is warm and dry. Capillary Refill: Capillary refill takes less than 2 seconds. Coloration: Skin is not jaundiced or pale. Neurological:      Mental Status: She is alert and oriented to person, place, and time. Mental status is at baseline. Psychiatric:         Mood and Affect: Mood normal.         Behavior: Behavior normal.     ASSESSMENT & PLAN:   Diagnosis Orders   1. Mastitis, left, acute            Mastitis, Left Breast  Start Keflex  Follow-up on Monday if symptoms have not significantly improved. Return if symptoms worsen or fail to improve.     ADRIAN Johnson CNM

## 2023-02-15 ENCOUNTER — OFFICE VISIT (OUTPATIENT)
Dept: OBGYN CLINIC | Age: 28
End: 2023-02-15
Payer: MEDICAID

## 2023-02-15 VITALS
BODY MASS INDEX: 20.37 KG/M2 | DIASTOLIC BLOOD PRESSURE: 72 MMHG | HEART RATE: 84 BPM | SYSTOLIC BLOOD PRESSURE: 116 MMHG | WEIGHT: 115 LBS

## 2023-02-15 NOTE — PROGRESS NOTES
SUBJECTIVE:   33 y.o. [id number] here for post partum exam. Pt with CD with Bilateral Salpingectomy. PT seen last week for mastitis and sx are improved at this time. Pt struggling with some depression sx. Pt breast feeding without difficulty. Pt with irregular VB since delivery and no complaints. Review of Systems:  General ROS: negative  Psychological ROS: negative  ENT ROS: negative  Endocrine ROS: negative  Respiratory ROS: no cough, shortness of breath, or wheezing  Cardiovascular ROS: no chest pain or dyspnea on exertion  Gastrointestinal ROS: no abdominal pain, change in bowel habits, or black or bloody stools  Genito-Urinary ROS: no dysuria, trouble voiding, or hematuria  Musculoskeletal ROS: negative  Neurological ROS: no TIA or stroke symptoms  Dermatological ROS: negative    OBJECTIVE:   /72   Pulse 84   Wt 115 lb (52.2 kg)   LMP 05/06/2022   Breastfeeding Yes   BMI 20.37 kg/m²     Physical Exam:  GEN: She appears well, afebrile. HEENT: normal cephalic, atraumatic  CVS: regular rate and rhythm  ABDOMEN: benign, soft, nontender, no masses. MUSCULOSKELETAL: normal gait, no masses  SKIN: normal texture and tone, no lesions  NEURO: normal tone, no hyperreflexia, 1+DTRs throughout      ASSESSMENT:   Post partum care and exam    PLAN:   LPS 6/20 NILM HPV neg  Past medical, social and family history reviewed and updated in pt's chart. Post partum care reviewed with patient.    PT with some depression sx  Referral to psychiatry for depression

## 2023-03-08 ENCOUNTER — OFFICE VISIT (OUTPATIENT)
Dept: BEHAVIORAL/MENTAL HEALTH CLINIC | Age: 28
End: 2023-03-08

## 2023-03-08 ENCOUNTER — TELEPHONE (OUTPATIENT)
Dept: BEHAVIORAL/MENTAL HEALTH CLINIC | Age: 28
End: 2023-03-08

## 2023-03-08 DIAGNOSIS — F41.1 GENERALIZED ANXIETY DISORDER: ICD-10-CM

## 2023-03-08 DIAGNOSIS — F33.9 MDD (MAJOR DEPRESSIVE DISORDER), RECURRENT, WITH POSTPARTUM ONSET (HCC): Primary | ICD-10-CM

## 2023-03-08 ASSESSMENT — ANXIETY QUESTIONNAIRES
1. FEELING NERVOUS, ANXIOUS, OR ON EDGE: 3
3. WORRYING TOO MUCH ABOUT DIFFERENT THINGS: 3
GAD7 TOTAL SCORE: 19
5. BEING SO RESTLESS THAT IT IS HARD TO SIT STILL: 1
4. TROUBLE RELAXING: 3
2. NOT BEING ABLE TO STOP OR CONTROL WORRYING: 3
6. BECOMING EASILY ANNOYED OR IRRITABLE: 3
7. FEELING AFRAID AS IF SOMETHING AWFUL MIGHT HAPPEN: 3

## 2023-03-08 ASSESSMENT — PATIENT HEALTH QUESTIONNAIRE - PHQ9
7. TROUBLE CONCENTRATING ON THINGS, SUCH AS READING THE NEWSPAPER OR WATCHING TELEVISION: 0
SUM OF ALL RESPONSES TO PHQ QUESTIONS 1-9: 10
6. FEELING BAD ABOUT YOURSELF - OR THAT YOU ARE A FAILURE OR HAVE LET YOURSELF OR YOUR FAMILY DOWN: 3
SUM OF ALL RESPONSES TO PHQ QUESTIONS 1-9: 10
1. LITTLE INTEREST OR PLEASURE IN DOING THINGS: 1
SUM OF ALL RESPONSES TO PHQ QUESTIONS 1-9: 9
4. FEELING TIRED OR HAVING LITTLE ENERGY: 1
3. TROUBLE FALLING OR STAYING ASLEEP: 1
5. POOR APPETITE OR OVEREATING: 0
2. FEELING DOWN, DEPRESSED OR HOPELESS: 3
8. MOVING OR SPEAKING SO SLOWLY THAT OTHER PEOPLE COULD HAVE NOTICED. OR THE OPPOSITE, BEING SO FIGETY OR RESTLESS THAT YOU HAVE BEEN MOVING AROUND A LOT MORE THAN USUAL: 0
SUM OF ALL RESPONSES TO PHQ QUESTIONS 1-9: 10
SUM OF ALL RESPONSES TO PHQ9 QUESTIONS 1 & 2: 4
9. THOUGHTS THAT YOU WOULD BE BETTER OFF DEAD, OR OF HURTING YOURSELF: 1

## 2023-03-08 NOTE — PROGRESS NOTES
Behavioral Health Consultation  Hwak Clark, [address] [facility] [address]. Psychologist  3/8/23  8:42 AM EST      Time spent with Patient: 30 minutes  This is patient's first  [facility] appointment. Reason for Consult:  depression  Referring Provider: Hoang Gotti MD  [address],  [address] [facility]    Pt provided informed consent for the behavioral health program. Discussed with patient model of service to include the limits of confidentiality (i.e. abuse reporting, suicide intervention, etc.) and short-term intervention focused approach. Pt indicated understanding. Feedback given to PCP. S:  Presenting Concerns:  Pt reports she recently had twins. Pt endorses depression daily, some anhedonia, trouble sleeping, fatigue, feeling bad about herself. Pt reports \"constant\" worry about her babies. Pt states that she has always had anxiety, frequent worry about a worst case scenario. Pt reports that she is frequently checking on the babies to make sure they are breathing, make sure they are ok. Has had similar thoughts/behaviors in the past regarding door checking. Pt reports that she has some help with getting up at night with the babies, but she is up every two hours. She gets about 4-5 hours of sleep per night. Pt is . Her  has an 6year-old who lives with them full time. Pt reports that she may have ADHD. Her biological mother is not really involved right now as she is living in [place]. They have a 1year-old daughter and just had twins in January. Pt reports that her  also has depression. States that she feels they have a good marriage but they have problems communicating describing that they will often \"shut each other out. \"     Pt states that her mother is living with them. She helps with the children. She will be staying with them until pt feels comfortable with all three kids.     Pt was previously prescribed Zoloft prior to her first daughter but this made her feel sick, was tried on something else but also had side-effects and stopped. History:          Diagnosis Date    Cough variant asthma 2/6/2014    Hodgkin disease ([facility] [address]     Hodgkin's    Kidney stone            Medications:   Current Outpatient Medications   Medication Sig Dispense Refill    oxyCODONE-acetaminophen (PERCOCET) 5-325 MG per tablet take 1 tablet by mouth every 6 hours if needed for pain      pediatric multivitamin-iron (POLY-VI-SOL WITH IRON) 11 MG/ML SOLN solution       Loratadine (CLARITIN PO) Take by mouth      Prenatal Vit-Fe Fumarate-FA (PRENATAL PO) Take by mouth       No current facility-administered medications for this visit.        Social History:   Social History     Socioeconomic History    Marital status:      Spouse name: Not on file    Number of children: Not on file    Years of education: Not on file    Highest education level: Not on file   Occupational History    Not on file   Tobacco Use    Smoking status: Never    Smokeless tobacco: Never   Vaping Use    Vaping Use: Never used   Substance and Sexual Activity    Alcohol use: Not Currently     Comment: rare    Drug use: No    Sexual activity: Yes     Partners: Male   Other Topics Concern    Not on file   Social History Narrative    Not on file     Social Determinants of Health     Financial Resource Strain: Low Risk     Difficulty of Paying Living Expenses: Not hard at all   Food Insecurity: No Food Insecurity    Worried About Running Out of Food in the Last Year: Never true    Ran Out of Food in the Last Year: Never true   Transportation Needs: Not on file   Physical Activity: Not on file   Stress: Not on file   Social Connections: Not on file   Intimate Partner Violence: Not on file   Housing Stability: Not on file         Family History:   Family History   Problem Relation Age of Onset    High Blood Pressure Mother     High Blood Pressure Father     High Blood Pressure Maternal Aunt     High Blood Pressure Maternal Grandmother     High Blood Pressure Maternal Grandfather     Diabetes Other         mom's side    Cancer Neg Hx     Breast Cancer Neg Hx     Prostate Cancer Neg Hx     Depression Neg Hx        TOBACCO:   reports that she has never smoked. She has never used smokeless tobacco.  ETOH:   reports that she does not currently use alcohol. O:  MSE:    Appearance    alert, cooperative, mild distress   Personal Hygiene : appropriately dressed, appropriately groomed, and healthy looking  Appetite normal  Sleep disturbance Yes, including: non-restful sleep and has infant twins  Fatigue Yes  Loss of pleasure Yes  Impulsive behavior No  Speech    spontaneous, normal rate, and normal volume  Mood   depressed   Affect    depressed affect  Thought Content    intact  Thought Process    linear, goal directed, and coherent  Associations    logical connections  Insight    good  Judgment    good  Orientation    oriented to person, place, time/date, situation, day of week, month of year, and year  Memory    recent and remote memory intact  Attention/Concentration    intact  Morbid ideation Yes  Suicide Assessment    no suicidal ideation        A:  Pt is given a diagnosis of MDD, Recurrent   Symptoms of depression include: depressed mood, anhedonia, insomnia, fatigue, feelings of worthlessness/guilt, difficulty concentrating, hopelessness, and recurrent thoughts of death    Also given diagnosis of BRITTANIE.   Symptoms of generalized anxiety include: excessive worry, difficulty controlling worry, restlessness, fatigue, difficulty concentrating, irritability, and sleep disturbance      PHQ Scores 3/8/2023 1/19/2023 4/26/2022 2/3/2021 1/14/2020 12/24/2019 2/12/2019   PHQ2 Score 4 0 0 1 0 0 3   PHQ9 Score 10 0 0 1 0 0 15     Interpretation of Total Score Depression Severity: 1-4 = Minimal depression, 5-9 = Mild depression, 10-14 = Moderate depression, 15-19 = Moderately severe depression, 20-27 = Severe depression    Administered the PHQ9 which indicates a self report of moderate symptom distress. BRITTANIE 7 SCORE 3/8/2023   BRITTANIE-7 Total Score 19     Interpretation of BRITTANIE-7 score: 5-9 = mild anxiety, 10-14 = moderate anxiety, 15+ = severe anxiety. Recommend referral to behavioral health for scores 10 or greater. Administered BRITTANIE-7 which indicates self report of severe anxiety. Pt would benefit from [facility] services to increase coping skills to provide symptom management/control/relief. Diagnosis:    Major depressive disorder; recurrent and with peripartum onset  Generalized anxiety disorder        Plan:  Pt interventions:  Provided education on the use of medication to treat  depression, Discussed various factors related to the development and maintenance of  depression (including biological, cognitive, behavioral, and environmental factors), Established rapport, Conducted functional assessment, Supportive techniques, and Provided Psychoeducation re: [facility] model        Pt Behavioral Change Plan:  Follow up in 1 week    Please note this report has been partially produced using speech recognition software and may cause contain errors related to that system including grammar, punctuation and spelling as well as words and phrases that may seem inappropriate. If there are questions or concerns please feel free to contact me to clarify.

## 2023-03-13 ENCOUNTER — OFFICE VISIT (OUTPATIENT)
Dept: FAMILY MEDICINE CLINIC | Age: 28
End: 2023-03-13
Payer: MEDICAID

## 2023-03-13 VITALS
WEIGHT: 114 LBS | OXYGEN SATURATION: 97 % | DIASTOLIC BLOOD PRESSURE: 80 MMHG | SYSTOLIC BLOOD PRESSURE: 102 MMHG | BODY MASS INDEX: 20.19 KG/M2 | HEART RATE: 99 BPM | TEMPERATURE: 97.7 F

## 2023-03-13 DIAGNOSIS — F41.9 ANXIETY: Primary | ICD-10-CM

## 2023-03-13 DIAGNOSIS — F32.A DEPRESSION, UNSPECIFIED DEPRESSION TYPE: ICD-10-CM

## 2023-03-13 PROCEDURE — 1036F TOBACCO NON-USER: CPT | Performed by: FAMILY MEDICINE

## 2023-03-13 PROCEDURE — G8420 CALC BMI NORM PARAMETERS: HCPCS | Performed by: FAMILY MEDICINE

## 2023-03-13 PROCEDURE — G8427 DOCREV CUR MEDS BY ELIG CLIN: HCPCS | Performed by: FAMILY MEDICINE

## 2023-03-13 PROCEDURE — 99214 OFFICE O/P EST MOD 30 MIN: CPT | Performed by: FAMILY MEDICINE

## 2023-03-13 PROCEDURE — G8484 FLU IMMUNIZE NO ADMIN: HCPCS | Performed by: FAMILY MEDICINE

## 2023-03-13 RX ORDER — SERTRALINE HYDROCHLORIDE 25 MG/1
25 TABLET, FILM COATED ORAL DAILY
Qty: 30 TABLET | Refills: 3 | Status: SHIPPED | OUTPATIENT
Start: 2023-03-13

## 2023-03-13 SDOH — ECONOMIC STABILITY: INCOME INSECURITY: HOW HARD IS IT FOR YOU TO PAY FOR THE VERY BASICS LIKE FOOD, HOUSING, MEDICAL CARE, AND HEATING?: NOT HARD AT ALL

## 2023-03-13 SDOH — ECONOMIC STABILITY: FOOD INSECURITY: WITHIN THE PAST 12 MONTHS, THE FOOD YOU BOUGHT JUST DIDN'T LAST AND YOU DIDN'T HAVE MONEY TO GET MORE.: NEVER TRUE

## 2023-03-13 SDOH — ECONOMIC STABILITY: HOUSING INSECURITY
IN THE LAST 12 MONTHS, WAS THERE A TIME WHEN YOU DID NOT HAVE A STEADY PLACE TO SLEEP OR SLEPT IN A SHELTER (INCLUDING NOW)?: NO

## 2023-03-13 SDOH — ECONOMIC STABILITY: FOOD INSECURITY: WITHIN THE PAST 12 MONTHS, YOU WORRIED THAT YOUR FOOD WOULD RUN OUT BEFORE YOU GOT MONEY TO BUY MORE.: NEVER TRUE

## 2023-03-13 NOTE — PROGRESS NOTES
Nasima Cortez (:  1995) is a 33 y.o. female,Established patient, here for evaluation of the following chief complaint(s): Anxiety (Would like to know what medications are safe to take while best feeding. )      SUBJECTIVE    History of present illness:     Patient gave birth 2 months ago  She has longstanding history of anxiety and depression  Was previously on Zoloft but stopped when she was pregnant for the first time in 2019  She has been seeing Dr. Hawk Morley for psychotherapy. States has been helpful  Feels like she should get back on medication  Is breast-feeding  Denies suicidal ideation    Review of Symptoms: As per HPI.      Past Medical History:   Diagnosis Date    Cough variant asthma 2014    Hodgkin disease [facility])     Hodgkin's    Kidney stone      Past Surgical History:   Procedure Laterality Date    BREAST FIBROADENOMA SURGERY  2013     SECTION N/A 2023     SECTION performed by Issa Yao MD at [facility] OR    DENTAL SURGERY  2022    all wisdom teeth removed     TYMPANOSTOMY TUBE PLACEMENT      WISDOM TOOTH EXTRACTION  2022     Family History   Problem Relation Age of Onset    High Blood Pressure Mother     High Blood Pressure Father     High Blood Pressure Maternal Aunt     High Blood Pressure Maternal Grandmother     High Blood Pressure Maternal Grandfather     Diabetes Other         mom's side    Cancer Neg Hx     Breast Cancer Neg Hx     Prostate Cancer Neg Hx     Depression Neg Hx      Social History     Socioeconomic History    Marital status:      Spouse name: Not on file    Number of children: Not on file    Years of education: Not on file    Highest education level: Not on file   Occupational History    Not on file   Tobacco Use    Smoking status: Never    Smokeless tobacco: Never   Vaping Use    Vaping Use: Never used   Substance and Sexual Activity    Alcohol use: Not Currently     Comment: rare    Drug use: No    Sexual activity: Yes     Partners: Male   Other Topics Concern    Not on file   Social History Narrative    Not on file     Social Determinants of Health     Financial Resource Strain: Low Risk     Difficulty of Paying Living Expenses: Not hard at all   Food Insecurity: No Food Insecurity    Worried About [address] in the Last Year: Never true    [address] in the Last Year: Never true   Transportation Needs: Unknown    Lack of Transportation (Medical): Not on file    Lack of Transportation (Non-Medical): No   Physical Activity: Not on file   Stress: Not on file   Social Connections: Not on file   Intimate Partner Violence: Not on file   Housing Stability: Unknown    Unable to Pay for Housing in the Last Year: Not on file    Number of Places Lived in the Last Year: Not on file    Unstable Housing in the Last Year: No     Penicillins  Prior to Admission medications    Medication Sig Start Date End Date Taking?  Authorizing Provider   sertraline (ZOLOFT) 25 MG tablet Take 1 tablet by mouth daily 3/13/23  Yes Gay All, DO   oxyCODONE-acetaminophen (PERCOCET) 5-325 MG per tablet take 1 tablet by mouth every 6 hours if needed for pain  Patient not taking: Reported on 3/13/2023 1/23/23   Historical Provider, MD   pediatric multivitamin-iron (POLY-VI-SOL WITH IRON) 11 MG/ML SOLN solution  1/23/23   Historical Provider, MD   Loratadine (CLARITIN PO) Take by mouth  Patient not taking: Reported on 3/13/2023    Historical Provider, MD   Prenatal Vit-Fe Fumarate-FA (PRENATAL PO) Take by mouth  Patient not taking: Reported on 3/13/2023    Historical Provider, MD       OBJECTIVE       /80 (Site: Right Upper Arm, Position: Sitting, Cuff Size: Medium Adult)   Pulse 99   Temp 97.7 °F (36.5 °C) (Tympanic)   Wt 114 lb (51.7 kg)   SpO2 97%   Breastfeeding Yes   BMI 20.19 kg/m²   General: alert and oriented, NAD  Head: normocephalic, atraumatic  Cardiovascular: No cyanosis  Respiratory: Clear to auscultation, no respiratory distress  Neurological: no focal neurological deficits  Psychological: normal mood and affect    ASSESSMENT/PLAN    Anxiety and depression  Uncontrolled. We discussed risk and benefits of starting a SSRI. We discussed that these medications do secrete into breastmilk; and have the potential to affect her  h  We discussed starting either Paxil or sertraline as they seem to have the best evidence for their safety  As she had success with sertraline in the past we will restart this at a low dosage. Continue psychotherapy with Dr. Octavio Garza   F/u in one month     Return in about 4 weeks (around 4/10/2023) for anxiety and depression . An electronic signature was used to authenticate this note.     --Maritza Mijares, DO

## 2023-03-29 ENCOUNTER — OFFICE VISIT (OUTPATIENT)
Dept: BEHAVIORAL/MENTAL HEALTH CLINIC | Age: 28
End: 2023-03-29
Payer: MEDICAID

## 2023-03-29 DIAGNOSIS — F33.9 MDD (MAJOR DEPRESSIVE DISORDER), RECURRENT, WITH POSTPARTUM ONSET (HCC): Primary | ICD-10-CM

## 2023-03-29 DIAGNOSIS — F41.1 GENERALIZED ANXIETY DISORDER: ICD-10-CM

## 2023-03-29 PROCEDURE — 90832 PSYTX W PT 30 MINUTES: CPT | Performed by: PSYCHOLOGIST

## 2023-03-29 PROCEDURE — 1036F TOBACCO NON-USER: CPT | Performed by: PSYCHOLOGIST

## 2023-03-29 ASSESSMENT — PATIENT HEALTH QUESTIONNAIRE - PHQ9
SUM OF ALL RESPONSES TO PHQ QUESTIONS 1-9: 7
6. FEELING BAD ABOUT YOURSELF - OR THAT YOU ARE A FAILURE OR HAVE LET YOURSELF OR YOUR FAMILY DOWN: 2
8. MOVING OR SPEAKING SO SLOWLY THAT OTHER PEOPLE COULD HAVE NOTICED. OR THE OPPOSITE, BEING SO FIGETY OR RESTLESS THAT YOU HAVE BEEN MOVING AROUND A LOT MORE THAN USUAL: 0
SUM OF ALL RESPONSES TO PHQ9 QUESTIONS 1 & 2: 2
2. FEELING DOWN, DEPRESSED OR HOPELESS: 1
7. TROUBLE CONCENTRATING ON THINGS, SUCH AS READING THE NEWSPAPER OR WATCHING TELEVISION: 0
SUM OF ALL RESPONSES TO PHQ QUESTIONS 1-9: 7
5. POOR APPETITE OR OVEREATING: 0
SUM OF ALL RESPONSES TO PHQ QUESTIONS 1-9: 7
1. LITTLE INTEREST OR PLEASURE IN DOING THINGS: 1
3. TROUBLE FALLING OR STAYING ASLEEP: 1
9. THOUGHTS THAT YOU WOULD BE BETTER OFF DEAD, OR OF HURTING YOURSELF: 0
SUM OF ALL RESPONSES TO PHQ QUESTIONS 1-9: 7
4. FEELING TIRED OR HAVING LITTLE ENERGY: 2

## 2023-03-29 ASSESSMENT — ANXIETY QUESTIONNAIRES
7. FEELING AFRAID AS IF SOMETHING AWFUL MIGHT HAPPEN: 3
GAD7 TOTAL SCORE: 15
3. WORRYING TOO MUCH ABOUT DIFFERENT THINGS: 2
6. BECOMING EASILY ANNOYED OR IRRITABLE: 2
4. TROUBLE RELAXING: 3
2. NOT BEING ABLE TO STOP OR CONTROL WORRYING: 3
5. BEING SO RESTLESS THAT IT IS HARD TO SIT STILL: 0
1. FEELING NERVOUS, ANXIOUS, OR ON EDGE: 2

## 2023-03-29 NOTE — PROGRESS NOTES
Diagnosis:    Major depressive disorder; recurrent and with peripartum onset  Generalized anxiety disorder        Plan:  Pt interventions:  Provided handout on  stress, Discussed various factors related to the development and maintenance of  anxiety (including biological, cognitive, behavioral, and environmental factors), Trained in strategies for increasing balanced thinking, Discussed self-care (sleep, nutrition, rewarding activities, social support, exercise), [place]-setting to identify pt's primary goals for [facility] visit / overall health, Supportive techniques, Emphasized self-care as important for managing overall health, Explained relaxed breathing technique in detail and practiced this with pt in visit, and Provided pt list of websites and several smartphone marlyn resources for further practicing guided meditations and breathing exercises        Pt Behavioral Change Plan:  1. Dedicate 1-3 minutes 3x/day to practice the deep breathing    2. Review the list of apps and give some a try! ([employer], [employer], [employer]BTi  and progressive muscle relaxation for sleep, etc.)    3. Read the below information about stress management    4. Follow up on 3/12 at 11:30    Please note this report has been partially produced using speech recognition software and may cause contain errors related to that system including grammar, punctuation and spelling as well as words and phrases that may seem inappropriate. If there are questions or concerns please feel free to contact me to clarify.

## 2023-05-26 ENCOUNTER — OFFICE VISIT (OUTPATIENT)
Dept: FAMILY MEDICINE CLINIC | Age: 28
End: 2023-05-26
Payer: MEDICAID

## 2023-05-26 VITALS
TEMPERATURE: 97 F | HEIGHT: 63 IN | HEART RATE: 89 BPM | SYSTOLIC BLOOD PRESSURE: 106 MMHG | DIASTOLIC BLOOD PRESSURE: 78 MMHG | OXYGEN SATURATION: 100 % | WEIGHT: 114 LBS | BODY MASS INDEX: 20.2 KG/M2

## 2023-05-26 DIAGNOSIS — M54.50 LOW BACK PAIN, UNSPECIFIED BACK PAIN LATERALITY, UNSPECIFIED CHRONICITY, UNSPECIFIED WHETHER SCIATICA PRESENT: ICD-10-CM

## 2023-05-26 DIAGNOSIS — M54.50 LOW BACK PAIN, UNSPECIFIED BACK PAIN LATERALITY, UNSPECIFIED CHRONICITY, UNSPECIFIED WHETHER SCIATICA PRESENT: Primary | ICD-10-CM

## 2023-05-26 LAB
BILIRUBIN, POC: NORMAL
BLOOD URINE, POC: NORMAL
CLARITY, POC: NORMAL
COLOR, POC: YELLOW
GLUCOSE URINE, POC: NORMAL
KETONES, POC: NORMAL
LEUKOCYTE EST, POC: NORMAL
NITRITE, POC: NORMAL
PH, POC: 6
PROTEIN, POC: NORMAL
SPECIFIC GRAVITY, POC: 1.02
UROBILINOGEN, POC: 3.5

## 2023-05-26 PROCEDURE — 99213 OFFICE O/P EST LOW 20 MIN: CPT | Performed by: NURSE PRACTITIONER

## 2023-05-26 PROCEDURE — G8427 DOCREV CUR MEDS BY ELIG CLIN: HCPCS | Performed by: NURSE PRACTITIONER

## 2023-05-26 PROCEDURE — 81003 URINALYSIS AUTO W/O SCOPE: CPT | Performed by: NURSE PRACTITIONER

## 2023-05-26 PROCEDURE — G8420 CALC BMI NORM PARAMETERS: HCPCS | Performed by: NURSE PRACTITIONER

## 2023-05-26 PROCEDURE — 1036F TOBACCO NON-USER: CPT | Performed by: NURSE PRACTITIONER

## 2023-05-26 NOTE — PROGRESS NOTES
effects of the medication prescribed today, as well as treatment plan/rationale and result expectations have been discussed with the patient/family who expresses understanding. Patient will be discharged home in stable condition. Follow up with PCP to evaluate treatment results or return if symptoms worsen or fail to improve. Discussed signs and symptoms which require immediate follow-up in ED/call to 911. Understanding verbalized. I have reviewed the patient's medical history in detail and updated the computerized patient record.     John Jerry, ADRIAN - CNP

## 2023-05-28 LAB — BACTERIA UR CULT: NORMAL

## 2023-07-27 ENCOUNTER — OFFICE VISIT (OUTPATIENT)
Dept: FAMILY MEDICINE CLINIC | Age: 28
End: 2023-07-27

## 2023-07-27 VITALS
BODY MASS INDEX: 20.2 KG/M2 | HEART RATE: 94 BPM | DIASTOLIC BLOOD PRESSURE: 62 MMHG | TEMPERATURE: 97.1 F | OXYGEN SATURATION: 98 % | HEIGHT: 63 IN | SYSTOLIC BLOOD PRESSURE: 116 MMHG | WEIGHT: 114 LBS

## 2023-07-27 DIAGNOSIS — Z11.52 ENCOUNTER FOR SCREENING FOR COVID-19: Primary | ICD-10-CM

## 2023-07-27 DIAGNOSIS — J11.1 INFLUENZA: ICD-10-CM

## 2023-07-27 LAB
INFLUENZA A ANTIBODY: ABNORMAL
INFLUENZA B ANTIBODY: ABNORMAL
Lab: 123
PERFORMING INSTRUMENT: NORMAL
QC PASS/FAIL: NORMAL
SARS-COV-2, POC: NORMAL

## 2023-07-27 RX ORDER — PRENATAL VIT/IRON FUM/FOLIC AC 27MG-0.8MG
1 TABLET ORAL DAILY
Qty: 30 TABLET | Refills: 1 | Status: SHIPPED | OUTPATIENT
Start: 2023-07-27 | End: 2023-09-25

## 2023-07-27 ASSESSMENT — ENCOUNTER SYMPTOMS
RESPIRATORY NEGATIVE: 1
EYES NEGATIVE: 1
SORE THROAT: 1
GASTROINTESTINAL NEGATIVE: 1

## 2023-07-27 NOTE — PROGRESS NOTES
[address] [address] [place] [address]  Dept: [phone]  Loc: [phone]     Pt Name: Serene Vergara  MRN: [id number]  [facility] [facility] 1995      HISTORY OF PRESENT ILLNESS    Serene Vergara is a 33 y.o. female who presents to the [facility] with chief complaint of 3-4 days ago of sore throat, congestion, cough, and bodyaches. Her kids were also ill with the same symptoms. Tmax 100.1 last night. She denies NVD. She has no abdominal pain and no other concerns at this time. She says it is improving from initial symptoms. REVIEW OF SYSTEMS       Review of Systems   Constitutional: Negative. HENT:  Positive for congestion and sore throat. Eyes: Negative. Respiratory: Negative. Cardiovascular: Negative. Gastrointestinal: Negative. Endocrine: Negative. Genitourinary: Negative. Musculoskeletal: Negative. Skin: Negative. Neurological: Negative. Psychiatric/Behavioral: Negative.          PAST MEDICAL HISTORY     Past Medical History:   Diagnosis Date    Cough variant asthma 2014    Hodgkin disease [facility])     Hodgkin's    Kidney stone          SURGICAL HISTORY       Past Surgical History:   Procedure Laterality Date    BREAST FIBROADENOMA SURGERY  2013     SECTION N/A 2023     SECTION performed by Anna Chavez MD at [facility] OR    DENTAL SURGERY  2022    all wisdom teeth removed     TYMPANOSTOMY TUBE PLACEMENT  1997    WISDOM TOOTH EXTRACTION  2022         CURRENT MEDICATIONS       Current Outpatient Medications   Medication Sig Dispense Refill    sertraline (ZOLOFT) 25 MG tablet Take 1 tablet by mouth daily (Patient not taking: Reported on 2023) 30 tablet 3    oxyCODONE-acetaminophen (PERCOCET) 5-325 MG per tablet take 1 tablet by mouth every 6 hours if needed for pain (Patient not taking: Reported on 3/13/2023)      pediatric

## 2023-08-07 ENCOUNTER — OFFICE VISIT (OUTPATIENT)
Dept: FAMILY MEDICINE CLINIC | Age: 28
End: 2023-08-07
Payer: MEDICAID

## 2023-08-07 VITALS
DIASTOLIC BLOOD PRESSURE: 74 MMHG | HEART RATE: 83 BPM | WEIGHT: 104 LBS | BODY MASS INDEX: 18.43 KG/M2 | HEIGHT: 63 IN | TEMPERATURE: 98.7 F | OXYGEN SATURATION: 98 % | SYSTOLIC BLOOD PRESSURE: 102 MMHG

## 2023-08-07 DIAGNOSIS — J02.9 SORE THROAT: Primary | ICD-10-CM

## 2023-08-07 DIAGNOSIS — J02.9 SORE THROAT: ICD-10-CM

## 2023-08-07 LAB — S PYO AG THROAT QL: NORMAL

## 2023-08-07 PROCEDURE — 87880 STREP A ASSAY W/OPTIC: CPT

## 2023-08-07 PROCEDURE — G8419 CALC BMI OUT NRM PARAM NOF/U: HCPCS

## 2023-08-07 PROCEDURE — G8427 DOCREV CUR MEDS BY ELIG CLIN: HCPCS

## 2023-08-07 PROCEDURE — 1036F TOBACCO NON-USER: CPT

## 2023-08-07 PROCEDURE — 99213 OFFICE O/P EST LOW 20 MIN: CPT

## 2023-08-07 ASSESSMENT — ENCOUNTER SYMPTOMS
SINUS PAIN: 0
COLOR CHANGE: 0
TROUBLE SWALLOWING: 0
SORE THROAT: 1
SHORTNESS OF BREATH: 0
COUGH: 1
SINUS PRESSURE: 0
NAUSEA: 0
WHEEZING: 0
RHINORRHEA: 0
SWOLLEN GLANDS: 1

## 2023-08-07 NOTE — PROGRESS NOTES
[facility] Encounter    SUBJECTIVE    CHIEF COMPLAINT:   Chief Complaint   Patient presents with    Pharyngitis     Did have flu a few weeks ago - mouth lesions in back of throat per patient        HPI:  Serene Vergara is a 33 y.o. female who presents as an established patient to the walk-in clinic today for:     Pharyngitis  This is a new problem. The current episode started 1 to 4 weeks ago. The problem occurs constantly. The problem has been unchanged. Associated symptoms include congestion (mostly resolved), coughing (in AM), a sore throat (7/10; mostly in AM or PM) and swollen glands. Pertinent negatives include no arthralgias, chest pain, chills, fatigue, fever, headaches, myalgias, nausea, rash or weakness. She has tried acetaminophen (cough drops) for the symptoms. The treatment provided mild relief. Seen on 7/27/23 for similar complaints, tested positive for flu A. Symptoms are mostly better except for lingering sore throat. Past Medical History:   Diagnosis Date    Cough variant asthma 2/6/2014    Hodgkin disease ([facility])     Hodgkin's    Kidney stone        Current Outpatient Medications on File Prior to Visit   Medication Sig Dispense Refill    Prenatal Vit-Fe Fumarate-FA (PRENATAL VITAMIN) 27-0.8 MG TABS Take 1 tablet by mouth daily 30 tablet 1     No current facility-administered medications on file prior to visit. Allergies   Allergen Reactions    Penicillins Hives       Review of Systems   Constitutional:  Negative for chills, fatigue and fever. HENT:  Positive for congestion (mostly resolved) and sore throat (7/10; mostly in AM or PM). Negative for ear pain, rhinorrhea, sinus pressure, sinus pain, sneezing and trouble swallowing. Respiratory:  Positive for cough (in AM). Negative for shortness of breath and wheezing. Cardiovascular:  Negative for chest pain and palpitations. Gastrointestinal:  Negative for nausea.    Musculoskeletal:

## 2023-08-10 LAB — BACTERIA THROAT AEROBE CULT: NORMAL

## 2024-03-01 ENCOUNTER — OFFICE VISIT (OUTPATIENT)
Dept: OBGYN CLINIC | Age: 29
End: 2024-03-01
Payer: MEDICAID

## 2024-03-01 VITALS
WEIGHT: 101 LBS | DIASTOLIC BLOOD PRESSURE: 66 MMHG | SYSTOLIC BLOOD PRESSURE: 104 MMHG | HEART RATE: 122 BPM | BODY MASS INDEX: 17.89 KG/M2

## 2024-03-01 DIAGNOSIS — N61.0 MASTITIS, RIGHT, ACUTE: Primary | ICD-10-CM

## 2024-03-01 DIAGNOSIS — T36.95XA ANTIBIOTIC-INDUCED YEAST INFECTION: ICD-10-CM

## 2024-03-01 DIAGNOSIS — B37.9 ANTIBIOTIC-INDUCED YEAST INFECTION: ICD-10-CM

## 2024-03-01 PROCEDURE — G8484 FLU IMMUNIZE NO ADMIN: HCPCS | Performed by: ADVANCED PRACTICE MIDWIFE

## 2024-03-01 PROCEDURE — G8427 DOCREV CUR MEDS BY ELIG CLIN: HCPCS | Performed by: ADVANCED PRACTICE MIDWIFE

## 2024-03-01 PROCEDURE — 99213 OFFICE O/P EST LOW 20 MIN: CPT | Performed by: ADVANCED PRACTICE MIDWIFE

## 2024-03-01 PROCEDURE — 1036F TOBACCO NON-USER: CPT | Performed by: ADVANCED PRACTICE MIDWIFE

## 2024-03-01 PROCEDURE — G8419 CALC BMI OUT NRM PARAM NOF/U: HCPCS | Performed by: ADVANCED PRACTICE MIDWIFE

## 2024-03-01 RX ORDER — CEPHALEXIN 500 MG/1
500 CAPSULE ORAL 4 TIMES DAILY
Qty: 40 CAPSULE | Refills: 0 | Status: SHIPPED | OUTPATIENT
Start: 2024-03-01 | End: 2024-03-11

## 2024-03-01 RX ORDER — FLUCONAZOLE 150 MG/1
TABLET ORAL
Qty: 3 TABLET | Refills: 1 | Status: SHIPPED | OUTPATIENT
Start: 2024-03-01 | End: 2024-05-30

## 2024-03-01 ASSESSMENT — ENCOUNTER SYMPTOMS
COUGH: 0
VOMITING: 0
NAUSEA: 0
CONSTIPATION: 0
SHORTNESS OF BREATH: 0
ABDOMINAL PAIN: 0
DIARRHEA: 0

## 2024-03-01 NOTE — PROGRESS NOTES
SUBJECTIVE:  Agnes Brown is a 28 y.o. female who presents here today for complaints of:      Chief Complaint   Patient presents with    Breast Pain     Pt c/o right breast pain, headache, fever, chills since last night      Acute Mastitis - Right Breast  Overnight and early this morning developed increased right breast tenderness, warmth, redness, fullness, and firmness to the right upper breast.  Now with low-grade fever, body aches, and chills.  She has had mastitis previously and recognized the symptoms of it returning again.  She has been able to continue breastfeeding well/frequently, both babies are latching well.    Review of Systems   Constitutional:  Positive for chills, fatigue and fever.   Respiratory:  Negative for cough and shortness of breath.    Gastrointestinal:  Negative for abdominal pain, constipation, diarrhea, nausea and vomiting.   Genitourinary:  Negative for difficulty urinating, dysuria, menstrual problem, pelvic pain, vaginal bleeding and vaginal discharge.   All other systems reviewed and are negative.    OBJECTIVE:  Vitals:  /66   Pulse (!) 122   Wt 45.8 kg (101 lb)   Breastfeeding Yes   BMI 17.89 kg/m²     Physical Exam  Constitutional:       General: She is not in acute distress.     Appearance: Normal appearance. She is not ill-appearing.   HENT:      Mouth/Throat:      Mouth: Mucous membranes are moist.   Eyes:      General: No scleral icterus.        Right eye: No discharge.         Left eye: No discharge.   Cardiovascular:      Rate and Rhythm: Normal rate.   Pulmonary:      Effort: Pulmonary effort is normal. No respiratory distress.   Chest:   Breasts:     Breasts are symmetrical.      Right: Normal. No swelling, bleeding, inverted nipple, mass, nipple discharge, skin change or tenderness.      Left: Normal. No swelling, bleeding, inverted nipple, mass, nipple discharge, skin change or tenderness.       Abdominal:      Palpations: Abdomen is soft.

## 2024-03-05 ASSESSMENT — PATIENT HEALTH QUESTIONNAIRE - PHQ9
7. TROUBLE CONCENTRATING ON THINGS, SUCH AS READING THE NEWSPAPER OR WATCHING TELEVISION: 0
6. FEELING BAD ABOUT YOURSELF - OR THAT YOU ARE A FAILURE OR HAVE LET YOURSELF OR YOUR FAMILY DOWN: 3
3. TROUBLE FALLING OR STAYING ASLEEP: 0
8. MOVING OR SPEAKING SO SLOWLY THAT OTHER PEOPLE COULD HAVE NOTICED. OR THE OPPOSITE, BEING SO FIGETY OR RESTLESS THAT YOU HAVE BEEN MOVING AROUND A LOT MORE THAN USUAL: 0
SUM OF ALL RESPONSES TO PHQ QUESTIONS 1-9: 7
2. FEELING DOWN, DEPRESSED OR HOPELESS: 1
10. IF YOU CHECKED OFF ANY PROBLEMS, HOW DIFFICULT HAVE THESE PROBLEMS MADE IT FOR YOU TO DO YOUR WORK, TAKE CARE OF THINGS AT HOME, OR GET ALONG WITH OTHER PEOPLE: 1
5. POOR APPETITE OR OVEREATING: SEVERAL DAYS
4. FEELING TIRED OR HAVING LITTLE ENERGY: SEVERAL DAYS
1. LITTLE INTEREST OR PLEASURE IN DOING THINGS: 1
9. THOUGHTS THAT YOU WOULD BE BETTER OFF DEAD, OR OF HURTING YOURSELF: NOT AT ALL
SUM OF ALL RESPONSES TO PHQ QUESTIONS 1-9: 7
SUM OF ALL RESPONSES TO PHQ9 QUESTIONS 1 & 2: 2
SUM OF ALL RESPONSES TO PHQ QUESTIONS 1-9: 7
4. FEELING TIRED OR HAVING LITTLE ENERGY: 1
6. FEELING BAD ABOUT YOURSELF - OR THAT YOU ARE A FAILURE OR HAVE LET YOURSELF OR YOUR FAMILY DOWN: NEARLY EVERY DAY
8. MOVING OR SPEAKING SO SLOWLY THAT OTHER PEOPLE COULD HAVE NOTICED. OR THE OPPOSITE - BEING SO FIDGETY OR RESTLESS THAT YOU HAVE BEEN MOVING AROUND A LOT MORE THAN USUAL: NOT AT ALL
9. THOUGHTS THAT YOU WOULD BE BETTER OFF DEAD, OR OF HURTING YOURSELF: 0
SUM OF ALL RESPONSES TO PHQ QUESTIONS 1-9: 7
7. TROUBLE CONCENTRATING ON THINGS, SUCH AS READING THE NEWSPAPER OR WATCHING TELEVISION: NOT AT ALL
10. IF YOU CHECKED OFF ANY PROBLEMS, HOW DIFFICULT HAVE THESE PROBLEMS MADE IT FOR YOU TO DO YOUR WORK, TAKE CARE OF THINGS AT HOME, OR GET ALONG WITH OTHER PEOPLE: SOMEWHAT DIFFICULT
1. LITTLE INTEREST OR PLEASURE IN DOING THINGS: SEVERAL DAYS
3. TROUBLE FALLING OR STAYING ASLEEP: NOT AT ALL
SUM OF ALL RESPONSES TO PHQ QUESTIONS 1-9: 7
2. FEELING DOWN, DEPRESSED OR HOPELESS: SEVERAL DAYS
5. POOR APPETITE OR OVEREATING: 1

## 2024-03-06 ENCOUNTER — OFFICE VISIT (OUTPATIENT)
Dept: FAMILY MEDICINE CLINIC | Age: 29
End: 2024-03-06
Payer: MEDICAID

## 2024-03-06 VITALS
OXYGEN SATURATION: 98 % | HEART RATE: 121 BPM | HEIGHT: 63 IN | DIASTOLIC BLOOD PRESSURE: 60 MMHG | BODY MASS INDEX: 17.86 KG/M2 | TEMPERATURE: 97.7 F | WEIGHT: 100.8 LBS | SYSTOLIC BLOOD PRESSURE: 110 MMHG

## 2024-03-06 DIAGNOSIS — M54.32 SCIATICA OF LEFT SIDE: Primary | ICD-10-CM

## 2024-03-06 DIAGNOSIS — R20.0 NUMBNESS IN LEFT LEG: ICD-10-CM

## 2024-03-06 PROCEDURE — 1036F TOBACCO NON-USER: CPT | Performed by: NURSE PRACTITIONER

## 2024-03-06 PROCEDURE — G8427 DOCREV CUR MEDS BY ELIG CLIN: HCPCS | Performed by: NURSE PRACTITIONER

## 2024-03-06 PROCEDURE — G8419 CALC BMI OUT NRM PARAM NOF/U: HCPCS | Performed by: NURSE PRACTITIONER

## 2024-03-06 PROCEDURE — 99214 OFFICE O/P EST MOD 30 MIN: CPT | Performed by: NURSE PRACTITIONER

## 2024-03-06 PROCEDURE — G8484 FLU IMMUNIZE NO ADMIN: HCPCS | Performed by: NURSE PRACTITIONER

## 2024-03-06 RX ORDER — METHYLPREDNISOLONE 4 MG/1
TABLET ORAL
Qty: 21 TABLET | Refills: 0 | Status: SHIPPED | OUTPATIENT
Start: 2024-03-06 | End: 2024-03-12

## 2024-03-06 ASSESSMENT — ENCOUNTER SYMPTOMS
BACK PAIN: 0
COUGH: 0
SHORTNESS OF BREATH: 0

## 2024-03-06 NOTE — PROGRESS NOTES
Difficulty of Paying Living Expenses: Not hard at all   Transportation Needs: Unknown (3/13/2023)    PRAPARE - Transportation     Lack of Transportation (Non-Medical): No   Housing Stability: Unknown (3/13/2023)    Housing Stability Vital Sign     Unstable Housing in the Last Year: No     No current outpatient medications on file prior to visit.     No current facility-administered medications on file prior to visit.     Allergies   Allergen Reactions    Penicillins Hives       Review of Systems   Constitutional:  Negative for fatigue.   Respiratory:  Negative for cough and shortness of breath.    Cardiovascular:  Negative for chest pain and leg swelling.   Musculoskeletal:  Negative for back pain.   Neurological:  Positive for weakness and numbness.       Objective  Vitals:    03/06/24 0850   BP: 110/60   Pulse: (!) 121   Temp: 97.7 °F (36.5 °C)   SpO2: 98%   Weight: 45.7 kg (100 lb 12.8 oz)   Height: 1.6 m (5' 3\")     Physical Exam  Vitals and nursing note reviewed.   Constitutional:       Appearance: Normal appearance. She is normal weight.   HENT:      Head: Normocephalic.      Nose: Nose normal.      Mouth/Throat:      Mouth: Mucous membranes are moist.      Pharynx: Oropharynx is clear.   Eyes:      Extraocular Movements: Extraocular movements intact.      Conjunctiva/sclera: Conjunctivae normal.      Pupils: Pupils are equal, round, and reactive to light.   Cardiovascular:      Rate and Rhythm: Normal rate and regular rhythm.      Pulses: Normal pulses.      Heart sounds: Normal heart sounds.   Pulmonary:      Effort: Pulmonary effort is normal.      Breath sounds: Normal breath sounds.   Musculoskeletal:        Legs:       Comments: Weakness on active resistance on the left leg   Skin:     General: Skin is warm.   Neurological:      General: No focal deficit present.      Mental Status: She is alert and oriented to person, place, and time. Mental status is at baseline.   Psychiatric:         Mood and Affect:

## 2024-04-24 ENCOUNTER — PATIENT MESSAGE (OUTPATIENT)
Dept: FAMILY MEDICINE CLINIC | Age: 29
End: 2024-04-24

## 2024-04-24 DIAGNOSIS — F41.9 ANXIETY: ICD-10-CM

## 2024-04-24 DIAGNOSIS — F32.A DEPRESSION, UNSPECIFIED DEPRESSION TYPE: ICD-10-CM

## 2024-04-24 NOTE — TELEPHONE ENCOUNTER
Comments: Pharmacy updated to pt preferred.    Last Office Visit (last PCP visit):   3/6/2024    Next Visit Date:  No future appointments.    **If hasn't been seen in over a year OR hasn't followed up according to last diabetes/ADHD visit, make appointment for patient before sending refill to provider.    Rx requested:  Requested Prescriptions     Pending Prescriptions Disp Refills    sertraline (ZOLOFT) 50 MG tablet 30 tablet 1     Sig: Take 1 tablet by mouth daily

## 2024-04-24 NOTE — TELEPHONE ENCOUNTER
From: Agnes Brown  To: Verenice Hobson  Sent: 4/24/2024 11:04 AM EDT  Subject: Zoloft    Hello, I was wondering now that I've pretty much stopped breastfeeding if I would be able to start my Zoloft again? I didn't know if I needed to come in for a visit or if you could send it to my pharmacy since I've taken it before.   Thanks,  Agnes

## 2024-07-18 NOTE — PATIENT INSTRUCTIONS
Call your OB tomorrow concerning symptoms  Urine culture and vaginal cultures sent today we will call with results Session ID: [id number]  Language: Korean   ID: #[id number]   Name: Mariaelena

## 2024-11-20 ENCOUNTER — HOSPITAL ENCOUNTER (EMERGENCY)
Facility: HOSPITAL | Age: 29
Discharge: HOME | End: 2024-11-20
Attending: EMERGENCY MEDICINE
Payer: COMMERCIAL

## 2024-11-20 ENCOUNTER — APPOINTMENT (OUTPATIENT)
Dept: CARDIOLOGY | Facility: HOSPITAL | Age: 29
End: 2024-11-20
Payer: COMMERCIAL

## 2024-11-20 ENCOUNTER — APPOINTMENT (OUTPATIENT)
Dept: RADIOLOGY | Facility: HOSPITAL | Age: 29
End: 2024-11-20
Payer: COMMERCIAL

## 2024-11-20 VITALS
BODY MASS INDEX: 18.61 KG/M2 | HEART RATE: 74 BPM | TEMPERATURE: 98.4 F | OXYGEN SATURATION: 99 % | HEIGHT: 63 IN | DIASTOLIC BLOOD PRESSURE: 76 MMHG | SYSTOLIC BLOOD PRESSURE: 106 MMHG | WEIGHT: 105 LBS | RESPIRATION RATE: 19 BRPM

## 2024-11-20 DIAGNOSIS — R00.2 PALPITATIONS: Primary | ICD-10-CM

## 2024-11-20 LAB
ALBUMIN SERPL BCP-MCNC: 4.6 G/DL (ref 3.4–5)
ALP SERPL-CCNC: 47 U/L (ref 33–110)
ALT SERPL W P-5'-P-CCNC: 10 U/L (ref 7–45)
ANION GAP SERPL CALC-SCNC: 12 MMOL/L (ref 10–20)
AST SERPL W P-5'-P-CCNC: 14 U/L (ref 9–39)
B-HCG SERPL-ACNC: <2 MIU/ML
BASOPHILS # BLD AUTO: 0.02 X10*3/UL (ref 0–0.1)
BASOPHILS NFR BLD AUTO: 0.4 %
BILIRUB SERPL-MCNC: 0.5 MG/DL (ref 0–1.2)
BNP SERPL-MCNC: 19 PG/ML (ref 0–99)
BUN SERPL-MCNC: 33 MG/DL (ref 6–23)
CALCIUM SERPL-MCNC: 9.2 MG/DL (ref 8.6–10.3)
CARDIAC TROPONIN I PNL SERPL HS: 4 NG/L (ref 0–13)
CARDIAC TROPONIN I PNL SERPL HS: <3 NG/L (ref 0–13)
CHLORIDE SERPL-SCNC: 103 MMOL/L (ref 98–107)
CO2 SERPL-SCNC: 27 MMOL/L (ref 21–32)
CREAT SERPL-MCNC: 0.72 MG/DL (ref 0.5–1.05)
EGFRCR SERPLBLD CKD-EPI 2021: >90 ML/MIN/1.73M*2
EOSINOPHIL # BLD AUTO: 0.07 X10*3/UL (ref 0–0.7)
EOSINOPHIL NFR BLD AUTO: 1.3 %
ERYTHROCYTE [DISTWIDTH] IN BLOOD BY AUTOMATED COUNT: 11.9 % (ref 11.5–14.5)
GLUCOSE SERPL-MCNC: 105 MG/DL (ref 74–99)
HCT VFR BLD AUTO: 36.8 % (ref 36–46)
HGB BLD-MCNC: 12.3 G/DL (ref 12–16)
IMM GRANULOCYTES # BLD AUTO: 0.01 X10*3/UL (ref 0–0.7)
IMM GRANULOCYTES NFR BLD AUTO: 0.2 % (ref 0–0.9)
INR PPP: 1 (ref 0.9–1.1)
LYMPHOCYTES # BLD AUTO: 2.46 X10*3/UL (ref 1.2–4.8)
LYMPHOCYTES NFR BLD AUTO: 44.4 %
MCH RBC QN AUTO: 30.8 PG (ref 26–34)
MCHC RBC AUTO-ENTMCNC: 33.4 G/DL (ref 32–36)
MCV RBC AUTO: 92 FL (ref 80–100)
MONOCYTES # BLD AUTO: 0.29 X10*3/UL (ref 0.1–1)
MONOCYTES NFR BLD AUTO: 5.2 %
NEUTROPHILS # BLD AUTO: 2.69 X10*3/UL (ref 1.2–7.7)
NEUTROPHILS NFR BLD AUTO: 48.5 %
NRBC BLD-RTO: 0 /100 WBCS (ref 0–0)
PLATELET # BLD AUTO: 225 X10*3/UL (ref 150–450)
POTASSIUM SERPL-SCNC: 3.6 MMOL/L (ref 3.5–5.3)
PROT SERPL-MCNC: 6.9 G/DL (ref 6.4–8.2)
PROTHROMBIN TIME: 11.3 SECONDS (ref 9.8–12.8)
RBC # BLD AUTO: 3.99 X10*6/UL (ref 4–5.2)
SODIUM SERPL-SCNC: 138 MMOL/L (ref 136–145)
TSH SERPL-ACNC: 1.16 MIU/L (ref 0.44–3.98)
WBC # BLD AUTO: 5.5 X10*3/UL (ref 4.4–11.3)

## 2024-11-20 PROCEDURE — 85025 COMPLETE CBC W/AUTO DIFF WBC: CPT | Performed by: EMERGENCY MEDICINE

## 2024-11-20 PROCEDURE — 99283 EMERGENCY DEPT VISIT LOW MDM: CPT | Mod: 25

## 2024-11-20 PROCEDURE — 36415 COLL VENOUS BLD VENIPUNCTURE: CPT | Performed by: EMERGENCY MEDICINE

## 2024-11-20 PROCEDURE — 83880 ASSAY OF NATRIURETIC PEPTIDE: CPT | Performed by: EMERGENCY MEDICINE

## 2024-11-20 PROCEDURE — 80053 COMPREHEN METABOLIC PANEL: CPT | Performed by: EMERGENCY MEDICINE

## 2024-11-20 PROCEDURE — 84702 CHORIONIC GONADOTROPIN TEST: CPT | Performed by: EMERGENCY MEDICINE

## 2024-11-20 PROCEDURE — 84484 ASSAY OF TROPONIN QUANT: CPT | Performed by: EMERGENCY MEDICINE

## 2024-11-20 PROCEDURE — 84443 ASSAY THYROID STIM HORMONE: CPT | Performed by: EMERGENCY MEDICINE

## 2024-11-20 PROCEDURE — 85610 PROTHROMBIN TIME: CPT | Performed by: EMERGENCY MEDICINE

## 2024-11-20 PROCEDURE — 71045 X-RAY EXAM CHEST 1 VIEW: CPT

## 2024-11-20 PROCEDURE — 71045 X-RAY EXAM CHEST 1 VIEW: CPT | Mod: FOREIGN READ | Performed by: RADIOLOGY

## 2024-11-20 PROCEDURE — 93005 ELECTROCARDIOGRAM TRACING: CPT

## 2024-11-20 ASSESSMENT — LIFESTYLE VARIABLES
EVER FELT BAD OR GUILTY ABOUT YOUR DRINKING: NO
HAVE PEOPLE ANNOYED YOU BY CRITICIZING YOUR DRINKING: NO
TOTAL SCORE: 0
HAVE YOU EVER FELT YOU SHOULD CUT DOWN ON YOUR DRINKING: NO
EVER HAD A DRINK FIRST THING IN THE MORNING TO STEADY YOUR NERVES TO GET RID OF A HANGOVER: NO

## 2024-11-20 ASSESSMENT — PAIN SCALES - GENERAL
PAINLEVEL_OUTOF10: 2
PAINLEVEL_OUTOF10: 2

## 2024-11-20 ASSESSMENT — COLUMBIA-SUICIDE SEVERITY RATING SCALE - C-SSRS
1. IN THE PAST MONTH, HAVE YOU WISHED YOU WERE DEAD OR WISHED YOU COULD GO TO SLEEP AND NOT WAKE UP?: NO
2. HAVE YOU ACTUALLY HAD ANY THOUGHTS OF KILLING YOURSELF?: NO
6. HAVE YOU EVER DONE ANYTHING, STARTED TO DO ANYTHING, OR PREPARED TO DO ANYTHING TO END YOUR LIFE?: NO

## 2024-11-20 ASSESSMENT — PAIN DESCRIPTION - LOCATION: LOCATION: CHEST

## 2024-11-20 ASSESSMENT — PAIN DESCRIPTION - DESCRIPTORS
DESCRIPTORS: ACHING
DESCRIPTORS: ACHING

## 2024-11-20 ASSESSMENT — PAIN DESCRIPTION - PAIN TYPE: TYPE: ACUTE PAIN

## 2024-11-20 ASSESSMENT — PAIN - FUNCTIONAL ASSESSMENT: PAIN_FUNCTIONAL_ASSESSMENT: 0-10

## 2024-11-21 LAB
ATRIAL RATE: 63 BPM
P AXIS: 58 DEGREES
P OFFSET: 203 MS
P ONSET: 152 MS
PR INTERVAL: 140 MS
Q ONSET: 222 MS
QRS COUNT: 11 BEATS
QRS DURATION: 74 MS
QT INTERVAL: 404 MS
QTC CALCULATION(BAZETT): 413 MS
QTC FREDERICIA: 410 MS
R AXIS: 79 DEGREES
T AXIS: 56 DEGREES
T OFFSET: 424 MS
VENTRICULAR RATE: 63 BPM

## 2024-11-21 NOTE — DISCHARGE INSTRUCTIONS
Seek immediate medical attention if you develop: worsening palpitations, chest pain, racing heart, nausea, vomiting, weakness, numbness, tingling, excessive sweating, shortness of breath, difficulty breathing, loss of motion in your arms or legs, or any new or worsening symptoms.

## 2024-11-21 NOTE — ED PROVIDER NOTES
HPI   Chief Complaint   Patient presents with    Chest Pain    Palpitations     Pt has been having heart palpitations x 2 weeks, pt does c/o cp and dizziness when these episodes happen       Patient is a 29-year-old female with no significant past medical history presenting to the ED with palpitations.  Patient endorses heart palpitations occurring daily for the past few weeks.  When the symptoms occur, she states she feels her chest throbbing and also feels short of breath.  Her family members also present in the room who states the patient has been feeling lightheaded, nauseous, but has also had a headache when the symptoms have been occurring.  Patient states relaxing helps her symptoms and she feels as though caffeine worsens them.  She denies any chest heaviness/pressure, dizziness, vision changes, abdominal pain, or nausea/vomiting.  She further denies any history of DVT/PE, blood clots, recent travel, or erythema/swelling of the lower extremities.  Of note, patient does admit to being under a lot of anxiety and stress recently.              Patient History   Past Medical History:   Diagnosis Date    Personal history of diseases of the blood and blood-forming organs and certain disorders involving the immune mechanism 04/07/2014    History of anemia    Personal history of pneumonia (recurrent)     History of pneumonia     Past Surgical History:   Procedure Laterality Date    BREAST LUMPECTOMY  04/07/2014    Breast Surgery Lumpectomy     No family history on file.  Social History     Tobacco Use    Smoking status: Never    Smokeless tobacco: Never   Substance Use Topics    Alcohol use: Never    Drug use: Never       Physical Exam   ED Triage Vitals [11/20/24 1905]   Temperature Heart Rate Respirations BP   36.9 °C (98.4 °F) 74 18 120/87      Pulse Ox Temp Source Heart Rate Source Patient Position   100 % Temporal Monitor Sitting      BP Location FiO2 (%)     Right arm --       Physical Exam  Vitals reviewed.    Constitutional:       General: She is not in acute distress.     Appearance: She is not ill-appearing.   Cardiovascular:      Rate and Rhythm: Normal rate and regular rhythm.   Pulmonary:      Effort: Pulmonary effort is normal. No respiratory distress.      Breath sounds: Normal breath sounds.   Chest:      Chest wall: No deformity, swelling or tenderness.   Abdominal:      General: Bowel sounds are normal.      Palpations: Abdomen is soft.      Tenderness: There is no abdominal tenderness.   Musculoskeletal:         General: No tenderness.      Right lower leg: No edema.      Left lower leg: No edema.   Skin:     General: Skin is warm and dry.   Neurological:      General: No focal deficit present.      Mental Status: She is alert.           ED Course & MDM   Diagnoses as of 11/20/24 2019   Palpitations     Labs Reviewed   CBC WITH AUTO DIFFERENTIAL - Abnormal       Result Value    WBC 5.5      nRBC 0.0      RBC 3.99 (*)     Hemoglobin 12.3      Hematocrit 36.8      MCV 92      MCH 30.8      MCHC 33.4      RDW 11.9      Platelets 225      Neutrophils % 48.5      Immature Granulocytes %, Automated 0.2      Lymphocytes % 44.4      Monocytes % 5.2      Eosinophils % 1.3      Basophils % 0.4      Neutrophils Absolute 2.69      Immature Granulocytes Absolute, Automated 0.01      Lymphocytes Absolute 2.46      Monocytes Absolute 0.29      Eosinophils Absolute 0.07      Basophils Absolute 0.02     COMPREHENSIVE METABOLIC PANEL - Abnormal    Glucose 105 (*)     Sodium 138      Potassium 3.6      Chloride 103      Bicarbonate 27      Anion Gap 12      Urea Nitrogen 33 (*)     Creatinine 0.72      eGFR >90      Calcium 9.2      Albumin 4.6      Alkaline Phosphatase 47      Total Protein 6.9      AST 14      Bilirubin, Total 0.5      ALT 10     B-TYPE NATRIURETIC PEPTIDE - Normal    BNP 19      Narrative:        <100 pg/mL - Heart failure unlikely  100-299 pg/mL - Intermediate probability of acute heart                   failure exacerbation. Correlate with clinical                  context and patient history.    >=300 pg/mL - Heart Failure likely. Correlate with clinical                  context and patient history.    BNP testing is performed using different testing methodology at [facility] than at other [facility] hospitals. Direct result comparisons should only be made within the same method.      PROTIME-INR - Normal    Protime 11.3      INR 1.0     TROPONIN SERIES- (INITIAL, 1 HR)    Narrative:     The following orders were created for panel order Troponin I Series, High Sensitivity (0, 1 HR).  Procedure                               Abnormality         Status                     ---------                               -----------         ------                     Troponin I, High Sensiti...[[id number]]                      In process                 Troponin, High Sensitivi...[[id number]]                                                   Please view results for these tests on the individual orders.   HUMAN CHORIONIC GONADOTROPIN, SERUM QUANTITATIVE   SERIAL TROPONIN-INITIAL   SERIAL TROPONIN, 1 HOUR   TSH WITH REFLEX TO FREE T4 IF ABNORMAL     XR chest 1 view   Final Result   Linear scarring remains at the right lung base but the lungs otherwise   are clear and there does not appear to be a significant change when   compared to the prior exam..   Signed by Chris Carlisle MD                  No data recorded     [place]oda Coma Scale Score: 15 (11/20/24 1907 : Carol Miranda RN)                           Medical Decision Making  Patient is a 29-year-old female with no significant past medical history presenting to the ED with palpitations.  History was obtained from the patient.  Endorses daily symptoms of palpitations along with chest throbbing and shortness of breath for the past 2 weeks.  Feel member also states that patient has been feeling lightheaded, nauseous, and has had a headache when the symptoms occur.   Patient states relaxing alleviates her symptoms and caffeine worsens them.  Denies true chest pain, neurologic symptoms, abdominal pain, or N/V.  No DVT/PE risk factors, as noted in HPI.  Patient does admit to being under a lot of stress and anxiety recently.  On physical exam, patient is sitting comfortably and in no apparent distress.  Lungs CTA bilaterally without increased work of breathing.  No deformity, swelling, or tenderness to the chest wall.  Abdomen soft and nontender with normal bowel sounds.  No tenderness or edema of the bilateral lower extremities.  Remainder of exam as noted above.  Vital signs are stable.    Patient is young without comorbidities which would make ACS unlikely.  EKG reveals normal sinus rhythm with a ventricular rate of 81.  No evidence of ST segment elevation or ischemia.  This is reassuring against ACS.  Other cause of her palpitations also considered such as arrhythmia, hyperthyroidism, or anxiety.  She does PERC out and therefore low clinical suspicion for DVT/PE supporting symptom etiology.  Workup obtained including CBC, CMP, cardiac labs, hCG, and chest x-ray.  CBC grossly unremarkable.  No significant anemia or leukocytosis.  Coags normal.  Chest x-ray without evidence of acute cardiopulmonary process.  Other labs pending.  Signout given to overnight JOSHUA @ 9 PM pending remainder of patient's further workups.  In the presence of no acute findings, anticipate she will be discharged with follow-up with her PCP as well as cardiologist for likely placement of a Holter monitor.  I did place the internal referral to cardiology myself prior to sign out.        Procedure  Procedures     Jodi Lugo PA-C  11/20/24 2020

## 2024-11-21 NOTE — ED PROVIDER NOTES
HPI   Chief Complaint   Patient presents with    Chest Pain    Palpitations     Pt has been having heart palpitations x 2 weeks, pt does c/o cp and dizziness when these episodes happen       HPI        Patient History   Past Medical History:   Diagnosis Date    Personal history of diseases of the blood and blood-forming organs and certain disorders involving the immune mechanism 04/07/2014    History of anemia    Personal history of pneumonia (recurrent)     History of pneumonia     Past Surgical History:   Procedure Laterality Date    BREAST LUMPECTOMY  04/07/2014    Breast Surgery Lumpectomy     No family history on file.  Social History     Tobacco Use    Smoking status: Never    Smokeless tobacco: Never   Substance Use Topics    Alcohol use: Never    Drug use: Never       Physical Exam   ED Triage Vitals [11/20/24 1905]   Temperature Heart Rate Respirations BP   36.9 °C (98.4 °F) 74 18 120/87      Pulse Ox Temp Source Heart Rate Source Patient Position   100 % Temporal Monitor Sitting      BP Location FiO2 (%)     Right arm --       Physical Exam      ED Course & MDM   Diagnoses as of 11/20/24 2047   Palpitations                 No data recorded     [place] Coma Scale Score: 15 (11/20/24 1958 : Katelyn Muhammad RN)                           Medical Decision Making      ===============================  ATTENDING ATTESTATION:    This patient was seen by the advanced practice provider.  I have personally performed a substantive portion of the encounter.  I have seen and examined the patient; agree with the workup, evaluation, MDM, management and diagnosis.  The care plan has been discussed.      I personally saw the patient and made/approved the management plan     This is a 29 y.o. female who presents to ER with palpitations MI going on for around 2 weeks.  She states she has had them every day.  They only last a few minutes.  No specific triggers.  States today she bent over and she had one of them.  She states that  they were related to caffeine.  She states she did eliminate caffeine and she still having them.  States she is under increasing stress over the last few weeks.  She states her children both had hand-foot-and-mouth disease.  There is been no chest pain with this but she has had a throbbing sensation in the chest.  She has had some mild shortness of breath with this.  Is also been some lightheadedness.  She denies without vomiting or diarrhea.  There is no other stimulants.  No drugs.  No dietary supplements.  No weight loss medications.  No decongestants.  She is no chronic medical conditions.  She is not on birth control.  No recent surgeries or immobilization or travel.    She states she has had some occasional symptoms like these in the past, they been having more frequently recently.    Heart is regular.  Lungs are clear.  Abdomen is soft and nontender.  No thyromegaly.  No distress.    EKG: Normal sinus rhythm with a ventricular rate 81.  .  QTc 436.  No ST changes.  Repeat EKG: Normal sinus rhythm with a sinus arrhythmia.  Ventricular rate 63.  .  QTc 413.  No ST changes.    Patient is PERC rule negative for pulmonary embolism    TSH is normal.  Troponin is negative.  Pregnancy is negative.  BNP is normal.  Chemistry is unremarkable except for minimally elevated BUN.  No leukocytosis.  No anemia.  Platelets are normal.  Chest x-ray shows some linear scarring in the right lung base, but is otherwise unremarkable.    Patient is discharged home.  She is to follow-up with primary care.  She is referred to cardiology.  She may need outpatient cardiac monitoring.  She is to return to the nearest ER for any new or worsening symptoms.  She is happy with this plan.  ==========================================          Procedure  Procedures     Rc Anderson, DO  11/20/24 2052       cR Anderson, DO  11/20/24 2053

## 2024-11-25 LAB
ATRIAL RATE: 81 BPM
P AXIS: 68 DEGREES
P OFFSET: 205 MS
P ONSET: 153 MS
PR INTERVAL: 136 MS
Q ONSET: 221 MS
QRS COUNT: 13 BEATS
QRS DURATION: 76 MS
QT INTERVAL: 376 MS
QTC CALCULATION(BAZETT): 436 MS
QTC FREDERICIA: 415 MS
R AXIS: 82 DEGREES
T AXIS: 30 DEGREES
T OFFSET: 409 MS
VENTRICULAR RATE: 81 BPM

## 2024-12-02 ENCOUNTER — OFFICE VISIT (OUTPATIENT)
Dept: CARDIOLOGY | Facility: CLINIC | Age: 29
End: 2024-12-02
Payer: COMMERCIAL

## 2024-12-02 VITALS
WEIGHT: 106 LBS | HEART RATE: 88 BPM | BODY MASS INDEX: 18.78 KG/M2 | HEIGHT: 63 IN | DIASTOLIC BLOOD PRESSURE: 64 MMHG | SYSTOLIC BLOOD PRESSURE: 110 MMHG

## 2024-12-02 DIAGNOSIS — I35.1 AORTIC VALVE INSUFFICIENCY, ETIOLOGY OF CARDIAC VALVE DISEASE UNSPECIFIED: Primary | ICD-10-CM

## 2024-12-02 DIAGNOSIS — R00.2 PALPITATIONS: ICD-10-CM

## 2024-12-02 PROBLEM — A41.52: Status: ACTIVE | Noted: 2024-12-02

## 2024-12-02 PROBLEM — R65.21: Status: ACTIVE | Noted: 2024-12-02

## 2024-12-02 PROBLEM — R59.1 LYMPHADENOPATHY: Status: ACTIVE | Noted: 2024-12-02

## 2024-12-02 PROBLEM — R00.0 TACHYCARDIA: Status: ACTIVE | Noted: 2024-12-02

## 2024-12-02 PROBLEM — I33.0: Status: ACTIVE | Noted: 2024-12-02

## 2024-12-02 PROCEDURE — 99205 OFFICE O/P NEW HI 60 MIN: CPT | Performed by: NURSE PRACTITIONER

## 2024-12-02 PROCEDURE — 3008F BODY MASS INDEX DOCD: CPT | Performed by: NURSE PRACTITIONER

## 2024-12-02 NOTE — PROGRESS NOTES
Shivani Lazaro is a 29 y.o. female that presents to the office today for new patient cardiac evaluation referred by  [facility] ER for palpitations.  She has a PMH of Aortic valve inefficiency Nodular sclerosing hodgkin's lymphoma.   She denies smoking, vaping, alcohol use, recreational drug use. Admits to 1 cup coffee day.  Family history positive for father with cardiac disease,  30's. Mother HTN.  She is  with 3 children.     She underwent chemotherapy for Nodular sclerosing hodgkin's lymphoma . Her chemotherapy included doxorubicin.  She developed pseudomonas in her IV med port resulting in pseudomonas sepsis and pneumonia.  There was also suspicion for endocarditis at that time.  Echocardiogram 2014 showed aortic valve insufficiency and was concerning for possible aortic valve endocarditis for which she was treated with aggressive antibiotic therapy.  Follow up with cardiology 10/2014 recommended cardiac MRI and genetic testing that was not follow through on.  She states that she has had 2 pregnancies without complications since her diagnosis/treatment of Nodular sclerosing hodgkin's lymphoma.     She presented to [facility] ER 2024 with reports of palpitations associated with lightheadedness and chest discomfort.  Work up was negative in ER.      She states that she started having palpitations approximately 1 month ago and felt they have progressively gotten worse.  She reports that for the first couple of weeks her palpitations were associated with mid sternal-left sided chest discomfort that has now subsided.  She does admit to shortness of breath and lightheadedness with her palpitations.  She states she is trying to  avoid caffeine as her palpitations are worse with caffeine.       Testing Reviewed  2024 labs:  Na 138, K + 3.6, bun 33, creat 0.72, ALT 10, AST 14, BNP 19, troponin 4,3, TSH  1.16, WBC 5.5, Hgb 12.3, platelets 225.    2024 EKG  NSR  HR 81 bpm  QT/QTC  376/436    11/20/2024 chest x-ray   Linear scarring remains at the right lung base but the lungs otherwise  are clear and there does not appear to be a significant change when  compared to the prior exam..    7/14/2015  Echocardiogram  1. Normal left ventricular size and normal systolic function.   2. The LV posterior wall thickness is mildly diminished.   3. Ejection fraction (apical biplane) = 65 %.   4. Global LV strain is normal at -20%.   5. Normal left ventricular diastolic function.   6. The aortic root size is near the upper limits of normal. Mildly dilated proximal ascending aorta.   7. Qualitatively normal right ventricular size and normal systolic function.   8. An SVC cast is present. The tip is just distal to the SVC/RA junction. It was also present on the 10-21-14 study.   9. No pericardial effusion.      Assessment/Plan  Palpitations:  Obtain 48 hr holter monitor to assess for atrial/ventricular arrhythmias.  Shortness of breath/aortic insufficiency:  Obtain Echocardiogram to assess for valve and pump function.   Chest discomfort:  Resolved.   Follow with Dr. Cuellar after testing for results and further recommendations.       Patient Active Problem List   Diagnosis    Tachycardia    Septic shock due to Pseudomonas species (Multi)    Lymphadenopathy    Endocarditis, bacterial, acute/subacute (HHS-HCC)    Aortic regurgitation       Social History     Tobacco Use    Smoking status: Never    Smokeless tobacco: Never   Substance Use Topics    Alcohol use: Never    Drug use: Never       Past Medical History:   Diagnosis Date    Personal history of diseases of the blood and blood-forming organs and certain disorders involving the immune mechanism 04/07/2014    History of anemia    Personal history of pneumonia (recurrent)     History of pneumonia       No current outpatient medications on file.    Penicillins    No family history on file.    Past Surgical History:   Procedure Laterality Date    BREAST  "LUMPECTOMY  04/07/2014    Breast Surgery Lumpectomy          Review of systems  Constitutional: No weight loss, fever, chills, weakness or fatigue  HEENT: No visual loss, blurred vision, double vision or yellow sclerae  Skin: No rash or itching  Cardiovascular: No chest pain, pressure or discomfort,  edema.  Positive for palpitations.   Respiratory: Positive for  shortness of breath, denies cough or sputum  Gastrointestinal: No nausea, vomiting or diarrhea. No bloody or dark tarry stools.  Neurological: No headache,  syncope. Positive for lightheadedness.   Musculoskeletal: No muscle, back pain, joint pain or stiffness.  Hematologic: No anemia, bleeding or bruising.    /64 (BP Location: Left arm, Patient Position: Sitting)   Pulse 88   Ht 1.6 m (5' 3\")   Wt 48.1 kg (106 lb)   LMP 11/13/2024   BMI 18.78 kg/m²     Patient Active Problem List   Diagnosis    Tachycardia    Septic shock due to Pseudomonas species (Multi)    Lymphadenopathy    Endocarditis, bacterial, acute/subacute (HHS-HCC)    Aortic regurgitation         Physical Exam  Constitutional: Well developed, awake/alert x 3, no distress.  Head/Neck: No JVD, No bruits  Respiratory/Thorax: patent airways, CTAB, normal breath sounds with good expansion.  Cardiovascular: Regular rate and rhythm, no murmurs, normal S1 and S2,   Gastrointestinal: Non distended, soft, non-tender, no rebound tenderness or guarding.  Extremities: No cyanosis, edema.    Neurological: Alert and oriented x 3. Moves extremities spontaneous with purpose.  Psychological: Appropriate mood and behavior  Skin: Warm and Dry. No lesions or rashes.         Please excuse any errors in grammar or translation related to dictation, voice recognition software was used to prepare this document.      "

## 2024-12-21 LAB
ATRIAL RATE: 63 BPM
ATRIAL RATE: 81 BPM
P AXIS: 58 DEGREES
P AXIS: 68 DEGREES
P OFFSET: 203 MS
P OFFSET: 205 MS
P ONSET: 152 MS
P ONSET: 153 MS
PR INTERVAL: 136 MS
PR INTERVAL: 140 MS
Q ONSET: 221 MS
Q ONSET: 222 MS
QRS COUNT: 11 BEATS
QRS COUNT: 13 BEATS
QRS DURATION: 74 MS
QRS DURATION: 76 MS
QT INTERVAL: 376 MS
QT INTERVAL: 404 MS
QTC CALCULATION(BAZETT): 413 MS
QTC CALCULATION(BAZETT): 436 MS
QTC FREDERICIA: 410 MS
QTC FREDERICIA: 415 MS
R AXIS: 79 DEGREES
R AXIS: 82 DEGREES
T AXIS: 30 DEGREES
T AXIS: 56 DEGREES
T OFFSET: 409 MS
T OFFSET: 424 MS
VENTRICULAR RATE: 63 BPM
VENTRICULAR RATE: 81 BPM

## 2024-12-23 ENCOUNTER — ANCILLARY PROCEDURE (OUTPATIENT)
Dept: CARDIOLOGY | Facility: HOSPITAL | Age: 29
End: 2024-12-23
Payer: COMMERCIAL

## 2024-12-23 ENCOUNTER — APPOINTMENT (OUTPATIENT)
Dept: CARDIOLOGY | Facility: CLINIC | Age: 29
End: 2024-12-23
Payer: COMMERCIAL

## 2024-12-23 DIAGNOSIS — I35.1 AORTIC VALVE INSUFFICIENCY, ETIOLOGY OF CARDIAC VALVE DISEASE UNSPECIFIED: ICD-10-CM

## 2024-12-23 DIAGNOSIS — R00.2 PALPITATIONS: ICD-10-CM

## 2024-12-23 LAB
AORTIC VALVE MEAN GRADIENT: 3 MMHG
AORTIC VALVE PEAK VELOCITY: 1.14 M/S
AV PEAK GRADIENT: 5 MMHG
AVA (PEAK VEL): 3.21 CM2
AVA (VTI): 3.05 CM2
EJECTION FRACTION APICAL 4 CHAMBER: 69
EJECTION FRACTION: 70 %
LEFT VENTRICLE INTERNAL DIMENSION DIASTOLE: 3.9 CM (ref 3.5–6)
LEFT VENTRICULAR OUTFLOW TRACT DIAMETER: 2.2 CM
LV EJECTION FRACTION BIPLANE: 66 %
MITRAL VALVE E/A RATIO: 1.89

## 2024-12-23 PROCEDURE — 93306 TTE W/DOPPLER COMPLETE: CPT | Performed by: INTERNAL MEDICINE

## 2024-12-23 PROCEDURE — 93306 TTE W/DOPPLER COMPLETE: CPT

## 2024-12-30 PROCEDURE — 93227 XTRNL ECG REC<48 HR R&I: CPT | Performed by: INTERNAL MEDICINE

## 2025-01-06 ENCOUNTER — APPOINTMENT (OUTPATIENT)
Dept: CARDIOLOGY | Facility: CLINIC | Age: 30
End: 2025-01-06
Payer: COMMERCIAL

## 2025-01-14 ENCOUNTER — APPOINTMENT (OUTPATIENT)
Dept: CARDIOLOGY | Facility: CLINIC | Age: 30
End: 2025-01-14
Payer: COMMERCIAL

## 2025-01-14 VITALS
BODY MASS INDEX: 18.43 KG/M2 | SYSTOLIC BLOOD PRESSURE: 102 MMHG | HEART RATE: 94 BPM | HEIGHT: 63 IN | DIASTOLIC BLOOD PRESSURE: 74 MMHG | WEIGHT: 104 LBS

## 2025-01-14 DIAGNOSIS — I47.11 INAPPROPRIATE SINUS TACHYCARDIA (CMS-HCC): ICD-10-CM

## 2025-01-14 PROCEDURE — G2211 COMPLEX E/M VISIT ADD ON: HCPCS | Performed by: INTERNAL MEDICINE

## 2025-01-14 PROCEDURE — 99214 OFFICE O/P EST MOD 30 MIN: CPT | Performed by: INTERNAL MEDICINE

## 2025-01-14 PROCEDURE — 3008F BODY MASS INDEX DOCD: CPT | Performed by: INTERNAL MEDICINE

## 2025-01-14 RX ORDER — DEXMETHYLPHENIDATE HYDROCHLORIDE 15 MG/1
15 CAPSULE, EXTENDED RELEASE ORAL DAILY
COMMUNITY
Start: 2025-01-04

## 2025-01-14 NOTE — PROGRESS NOTES
Patient:  Shivani Lazaro  YOB: 1995  MRN: [id number]       Impression/Plan:     Diagnoses and all orders for this visit:  Inappropriate sinus tachycardia (CMS-HCC)  -     Monitoring shows no dysrhythmia outside of inappropriate sinus tachycardia  -     I reviewed tracings with her in the time that she was the fastest 140 bpm she was just sitting having a snack no emotional stress or physical activity.  She was also asymptomatic at that time  -     Certainly her Focalin XR exacerbates inappropriate sinus tachycardia but still she is asymptomatic the vast majority the time perhaps once a week she will notice some skipping of her heart rate.  She need not discontinue that agent if it is giving her some benefit.  -      I reviewed with her the importance of avoiding dehydration.  Regular exercise is also very helpful.  She currently has 4 girls at home which keeps her very busy and does make it difficult to do regular exercise.  She said when the girls were younger and she could be at the gym once in a while she felt much better when she was exercising regularly.  I think regular exercise would also help blunt her inappropriate sinus tachycardia and she should try to walk at least 20 minutes a day.  I also encouraged her to eat regularly as well as maintaining good hydration.  Could also consider support stockings but her symptoms are relatively minimal at this time      Chief Complaint/Active Symptoms:       Shivani Lazaro is a 29 y.o. female who presents with history of nodular sclerosing Hodgkin's lymphoma in 2014 with chemotherapy that did include doxorubicin.       Presented as a new patient in this office seen by Corrine Felton NP on 12/2/2024.  At that time had been recently discharged in [facility] emergency room with palpitations lightheadedness and chest discomfort with a negative ER workup.  She had noted palpitations being worse with caffeine.  Twelve-lead ECG unremarkable.  Chest  x-ray in the ER has been unremarkable.  In light of her palpitations and prior history suggesting aortic insufficiency echocardiography and monitoring were pursued.  She presents at this time for further evaluation and review    Lab work November 2024 with normal TSH/troponin/BNP/hCG.  CMP/CBC unremarkable    12/23/2024 echocardiogram          1. Normal Echocardiogram.   2. The left ventricular systolic function is normal, with a visually estimated ejection fraction of 70%.   3. There is normal right ventricular global systolic function.   4. RVSP could not be calculated as no significant TR Doppler envelope.   5. Normal valve structure and function.      12/23/2020 four 48-hour Holter monitor  1.  Sinus rhythm throughout with average heart rate 78 bpm somewhat excessive heart rate variability  2.  Episode of sinus tachycardia to 140 yet description of activity suggest very low activity levels suggesting inappropriate sinus tachycardia this was asymptomatic  3.  Her symptom of nausea shortness of breath and rapid heart racing associated with sinus tachycardia 103-113 bpm  4.  Only 2 PVCs well within normal range.  There were less than 5 PACs per hour which is also within normal range  5.  No evidence of SVT/A-fib/A-flutter/VT/heart block    She denies angina, dyspnea, edema, lightheadedness or syncope.  She has had no symptoms of claudication or neurologic deterioration.  There have been no hospitalizations or emergency room visits since last office visit.    She reports no chest pain or short of breath.  She works as a  at the [employer] on the weekends.  She notes no symptoms when she is active.  She does have palpitations and does note her heart to skip on occasion.  Recently was started on Focalin XR.  This is a stimulant used for ADHD.  She notes since being on that sometimes she notes her heart to be a little faster and she feels a little tingly and jittery with that.  She has had no syncope or  "dizziness.  She also says she oftentimes skips breakfast and sometimes maybe does not drink as much water as she should.  She notes no lower extremity edema.  She does not drink coffee.  She does not use drugs or smoke cigarettes.  Although she does have ADHD has not had history of eating disorder.  She does states she can eat as much as she wants and still does not usually gain weight.      Review of Systems: Unremarkable except as noted above    Meds     Current Outpatient Medications   Medication Instructions    dexmethylphenidate XR (FOCALIN XR) 15 mg, Daily        Allergies     Allergies   Allergen Reactions    Penicillins Hives and Rash         Annotated Problems     Specialty Problems          Cardiology Problems    Aortic regurgitation    Endocarditis, bacterial, acute/subacute ([facility])    Tachycardia        Problem List     Patient Active Problem List    Diagnosis Date Noted    Tachycardia 12/02/2024    Septic shock due to Pseudomonas species (Multi) 12/02/2024    Lymphadenopathy 12/02/2024    Endocarditis, bacterial, acute/subacute ([facility]-HCC) 12/02/2024    Aortic regurgitation 12/02/2024       Objective:     Vitals:    01/14/25 1308   BP: 102/74   BP Location: Left arm   Patient Position: Sitting   Pulse: 94   Weight: 47.2 kg (104 lb)   Height: 1.6 m (5' 3\")      Wt Readings from Last 4 Encounters:   01/14/25 47.2 kg (104 lb)   12/02/24 48.1 kg (106 lb)   11/20/24 47.6 kg (105 lb)           LAB:     Lab Results   Component Value Date    WBC 5.5 11/20/2024    HGB 12.3 11/20/2024    HCT 36.8 11/20/2024     11/20/2024    ALT 10 11/20/2024    AST 14 11/20/2024     11/20/2024    K 3.6 11/20/2024     11/20/2024    CREATININE 0.72 11/20/2024    BUN 33 (H) 11/20/2024    CO2 27 11/20/2024    TSH 1.16 11/20/2024    INR 1.0 11/20/2024       Diagnostic Studies:     ECG 12 lead    Result Date: 12/21/2024  Normal sinus rhythm with sinus arrhythmia Normal ECG When compared with ECG of 21-OCT-2014 " 13:44, No significant change was found Confirmed by Madiha Spain ([id number]) on 12/21/2024 9:49:16 PM    ECG 12 lead    Result Date: 12/21/2024  Normal sinus rhythm T wave abnormality, consider inferior ischemia Abnormal ECG When compared with ECG of 21-OCT-2014 13:44, No significant change was found Confirmed by Madiha Spain ([id number]) on 12/21/2024 9:48:33 PM    XR chest 1 view    Result Date: 11/20/2024  STUDY: Chest Radiograph;  11/20/2024 19:30 INDICATION: Chest pain. COMPARISON: 10/20/2018 XR Chest ACCESSION NUMBER(S): [id number] ORDERING CLINICIAN: WES HEALY TECHNIQUE:  Frontal chest was obtained at 19:29 hours. FINDINGS: CARDIOMEDIASTINAL SILHOUETTE: Cardiomediastinal silhouette is normal in size and configuration.  LUNGS: Areas of linear scarring are seen in the right lung base but the pattern is not changed when compared to the prior exam.  No consolidation or acute abnormalities are appreciated and there is no evidence of effusion or pneumothorax..  ABDOMEN: No remarkable upper abdominal findings.  BONES: No acute osseous changes.    Linear scarring remains at the right lung base but the lungs otherwise are clear and there does not appear to be a significant change when compared to the prior exam.. Signed by Chris Carlisle MD        Radiology:     No orders to display       Physical Exam     General Appearance: alert and oriented to person, place and time, in no acute distress, thin  Cardiovascular: normal rate, regular rhythm, normal S1 and S2, no murmurs, rubs, clicks, or gallops,  no JVD  Pulmonary/Chest: clear to auscultation bilaterally- no wheezes, rales or rhonchi, normal air movement, no respiratory distress  Abdomen: soft, non-tender, non-distended, normal bowel sounds, no masses   Extremities: no cyanosis, clubbing or edema  Skin: warm and dry, no rash or erythema  Eyes: EOMI  Neck: supple and non-tender without mass, no thyromegaly   Neurological: alert, oriented, normal speech, no focal  findings or movement disorder noted  Vascular:         Scribe Attestation  By signing my name below, I, Carolina Zamarripa CMA   , Scrbrisa   attest that this documentation has been prepared under the direction and in the presence of Karthik Cuellar MD.

## 2025-01-14 NOTE — PATIENT INSTRUCTIONS
HELPFUL TIPS/TREATMENT FOR SINUS TACHYCARDIA    EAT AND DRINK REGULAR- EAT 3 MEALS A DAY AND DRINK FLUIDS.  NO LESS THAN 48 OZ OF FLUID.  64 OZ IS THE DAILY TARGET.  CAFFEINE AND SUGARY DRINKS DO NOT COUNT    REGULAR EXERCISE- WALK 20 MINUTES A DAY WITHOUT STOPPING    DID YOU KNOW  We have a pharmacy here in the [facility].  They can fill all prescriptions, not just cardiac medications.  Prescriptions from other pharmacies can easily be transferred to the  pharmacy by the  pharmacist on site.   pharmacies offer FREE HOME DELIVERY on medications to anywhere in [place]. They can sync your medications. Typically prescriptions can be ready in 10 - 15 minutes. If pharmacy is unable to fill your  prescription or if cost is more than your paying now the Pharmacist can easily transfer back to your Pharmacy of choice. Pharmacy phone # [phone].     Please bring all medicines, vitamins, and herbal supplements with you in original bottles to every appointment! This is the best way to ensure your medication list in your chart is accurate.    Prescriptions will not be filled unless you are compliant with your follow up appointments or have a follow up appointment scheduled as per instruction of your physician. Refills should be requested at the time of your visit.

## 2025-01-17 ENCOUNTER — OFFICE VISIT (OUTPATIENT)
Dept: URGENT CARE | Age: 30
End: 2025-01-17
Payer: COMMERCIAL

## 2025-01-17 VITALS
SYSTOLIC BLOOD PRESSURE: 114 MMHG | HEART RATE: 90 BPM | HEIGHT: 63 IN | RESPIRATION RATE: 16 BRPM | DIASTOLIC BLOOD PRESSURE: 85 MMHG | WEIGHT: 104 LBS | BODY MASS INDEX: 18.43 KG/M2 | OXYGEN SATURATION: 98 % | TEMPERATURE: 98.5 F

## 2025-01-17 DIAGNOSIS — J06.9 UPPER RESPIRATORY TRACT INFECTION, UNSPECIFIED TYPE: Primary | ICD-10-CM

## 2025-01-17 RX ORDER — BROMPHENIRAMINE MALEATE, PSEUDOEPHEDRINE HYDROCHLORIDE, AND DEXTROMETHORPHAN HYDROBROMIDE 2; 30; 10 MG/5ML; MG/5ML; MG/5ML
5 SYRUP ORAL EVERY 4 HOURS PRN
Qty: 120 ML | Refills: 0 | Status: SHIPPED | OUTPATIENT
Start: 2025-01-17

## 2025-01-17 RX ORDER — AZITHROMYCIN 250 MG/1
TABLET, FILM COATED ORAL
Qty: 6 TABLET | Refills: 0 | Status: SHIPPED | OUTPATIENT
Start: 2025-01-17

## 2025-01-17 RX ORDER — PREDNISONE 10 MG/1
10 TABLET ORAL
Qty: 10 TABLET | Refills: 0 | Status: SHIPPED | OUTPATIENT
Start: 2025-01-17 | End: 2025-01-22

## 2025-01-17 ASSESSMENT — PAIN SCALES - GENERAL: PAINLEVEL_OUTOF10: 5

## 2025-01-17 NOTE — PROGRESS NOTES
"Subjective   Patient ID: Shivani Lazaro is a 29 y.o. female who presents for Earache and Cough (Left ear pain x 1 week).  HPI  Presents for evaluation of URI.  Symptoms including cough, congestion, and ear pain have been present for several days and refractory to OTC meds.  No fever, chills, nausea, vomiting, abdominal pain, CP, or SOB.  No exacerbating factors.  Patient states that the cough is coarse and barky at times.  No other complaints.    Review of Systems    Constitutional:  See HPI   ENT: See HPI  Respiratory: See HPI  Neurologic:  Alert and oriented X4, No numbness, No tingling.    All other systems are negative     Objective     /85 (BP Location: Left arm, Patient Position: Sitting)   Pulse 90   Temp 36.9 °C (98.5 °F) (Oral)   Resp 16   Ht 1.6 m (5' 3\")   Wt 47.2 kg (104 lb)   SpO2 98%   BMI 18.42 kg/m²     Physical Exam    General:  Alert and oriented, No acute distress.    Eye:  Pupils are equal, round and reactive to light, Normal conjunctiva.    HENT:  Normocephalic, bilateral tympanic membranes and canals are unremarkable; unremarkable oropharynx; no cervical adenopathy  Neck:  Supple    Respiratory: Respirations are non-labored; LCTA bilaterally  Musculoskeletal: Normal ROM and strength  Integumentary:  Warm, Dry, Intact, No pallor, No rash.    Neurologic:  Alert, Oriented, Normal sensory, Cranial Nerves II-XII are grossly intact  Psychiatric:  Cooperative, Appropriate mood & affect.    Assessment/Plan   Exam is consistent with upper respiratory infection.  Prescription for Zithromax, prednisone, and Bromfed.  Patient's clinical presentation is otherwise unremarkable at this time. Patient is discharged with instructions to follow-up with primary care or seek emergency medical attention for worsening symptoms or any new concerns.  Problem List Items Addressed This Visit    None  Visit Diagnoses       Upper respiratory tract infection, unspecified type    -  Primary    Relevant " Medications    azithromycin (Zithromax) 250 mg tablet    brompheniramine-pseudoeph-DM (Bromfed DM) 2-30-10 mg/5 mL syrup    predniSONE (Deltasone) 10 mg tablet            Final diagnoses:   [J06.9] Upper respiratory tract infection, unspecified type

## 2025-02-04 ENCOUNTER — OFFICE VISIT (OUTPATIENT)
Dept: URGENT CARE | Age: 30
End: 2025-02-04
Payer: COMMERCIAL

## 2025-02-04 VITALS
DIASTOLIC BLOOD PRESSURE: 81 MMHG | BODY MASS INDEX: 18.61 KG/M2 | TEMPERATURE: 97.4 F | OXYGEN SATURATION: 99 % | RESPIRATION RATE: 20 BRPM | SYSTOLIC BLOOD PRESSURE: 113 MMHG | HEART RATE: 107 BPM | HEIGHT: 63 IN | WEIGHT: 105 LBS

## 2025-02-04 DIAGNOSIS — J40 BRONCHITIS: Primary | ICD-10-CM

## 2025-02-04 DIAGNOSIS — J06.9 UPPER RESPIRATORY TRACT INFECTION, UNSPECIFIED TYPE: ICD-10-CM

## 2025-02-04 LAB
POC RAPID INFLUENZA A: NEGATIVE
POC RAPID INFLUENZA B: NEGATIVE
POC SARS-COV-2 AG BINAX: NORMAL

## 2025-02-04 RX ORDER — BROMPHENIRAMINE MALEATE, PSEUDOEPHEDRINE HYDROCHLORIDE, AND DEXTROMETHORPHAN HYDROBROMIDE 2; 30; 10 MG/5ML; MG/5ML; MG/5ML
5 SYRUP ORAL EVERY 4 HOURS PRN
Qty: 120 ML | Refills: 0 | Status: SHIPPED | OUTPATIENT
Start: 2025-02-04

## 2025-02-04 RX ORDER — AZITHROMYCIN 250 MG/1
TABLET, FILM COATED ORAL
Qty: 6 TABLET | Refills: 0 | Status: SHIPPED | OUTPATIENT
Start: 2025-02-04

## 2025-02-04 RX ORDER — PREDNISONE 20 MG/1
20 TABLET ORAL 2 TIMES DAILY
Qty: 10 TABLET | Refills: 0 | Status: SHIPPED | OUTPATIENT
Start: 2025-02-04 | End: 2025-02-09

## 2025-02-04 NOTE — PROGRESS NOTES
"Subjective   Patient ID: Shivani Lazaro is a 29 y.o. female who presents for Nasal Congestion (X 7 days new symptoms ), Cough (White sputum ), Fatigue, Generalized Body Aches, Vomiting, Headache, and Fever.  HPI  Presents for evaluation of URI.  Symptoms including cough, congestion, body aches, malaise, and headache have been present for several days and refractory to OTC meds.  No fever, chills, abdominal pain, CP, or SOB.  No exacerbating factors    Review of Systems    Constitutional:  See HPI   ENT: See HPI  Respiratory: See HPI  Neurologic:  Alert and oriented X4, No numbness, No tingling.    All other systems are negative     Objective     /81   Pulse 107   Temp 36.3 °C (97.4 °F)   Resp 20   Ht 1.6 m (5' 3\")   Wt 47.6 kg (105 lb)   SpO2 99%   BMI 18.60 kg/m²     Physical Exam    General:  Alert and oriented, No acute distress.    Eye:  Pupils are equal, round and reactive to light, Normal conjunctiva.    HENT:  Normocephalic, unremarkable oropharynx; no cervical adenopathy; no sinus tenderness  Neck:  Supple    Respiratory: Respirations are non-labored; left-sided wheezing and rhonchi; no rales  Musculoskeletal: Normal ROM and strength  Integumentary:  Warm, Dry, Intact, No pallor, No rash.    Neurologic:  Alert, Oriented, Normal sensory, Cranial Nerves II-XII are grossly intact  Psychiatric:  Cooperative, Appropriate mood & affect.    Assessment/Plan   Exam is consistent with upper respiratory infection and bronchitis.  Prescriptions for Z-[place], prednisone, and Bromfed.  Patient's clinical presentation is otherwise unremarkable at this time. Patient is discharged with instructions to follow-up with primary care or seek emergency medical attention for worsening symptoms or any new concerns.  Problem List Items Addressed This Visit    None  Visit Diagnoses       Bronchitis    -  Primary    Relevant Medications    azithromycin (Zithromax) 250 mg tablet    brompheniramine-pseudoeph-DM (Bromfed DM) " 2-30-10 mg/5 mL syrup    predniSONE (Deltasone) 20 mg tablet    Other Relevant Orders    POCT Covid-19 Rapid Antigen (Completed)    POCT Influenza A/B manually resulted (Completed)    Upper respiratory tract infection, unspecified type        Relevant Medications    azithromycin (Zithromax) 250 mg tablet    brompheniramine-pseudoeph-DM (Bromfed DM) 2-30-10 mg/5 mL syrup    predniSONE (Deltasone) 20 mg tablet            Final diagnoses:   [J40] Bronchitis   [J06.9] Upper respiratory tract infection, unspecified type

## 2025-02-11 ENCOUNTER — OFFICE VISIT (OUTPATIENT)
Dept: OBGYN CLINIC | Age: 30
End: 2025-02-11
Payer: COMMERCIAL

## 2025-02-11 VITALS
WEIGHT: 98 LBS | BODY MASS INDEX: 17.36 KG/M2 | DIASTOLIC BLOOD PRESSURE: 74 MMHG | SYSTOLIC BLOOD PRESSURE: 106 MMHG | HEIGHT: 63 IN

## 2025-02-11 DIAGNOSIS — Z01.419 WELL WOMAN EXAM WITH ROUTINE GYNECOLOGICAL EXAM: ICD-10-CM

## 2025-02-11 DIAGNOSIS — Z01.419 WELL WOMAN EXAM WITH ROUTINE GYNECOLOGICAL EXAM: Primary | ICD-10-CM

## 2025-02-11 DIAGNOSIS — Z12.4 ENCOUNTER FOR PAP SMEAR OF CERVIX WITH HPV DNA COTESTING: ICD-10-CM

## 2025-02-11 PROCEDURE — 99395 PREV VISIT EST AGE 18-39: CPT | Performed by: OBSTETRICS & GYNECOLOGY

## 2025-02-11 RX ORDER — PREDNISONE 20 MG/1
20 TABLET ORAL DAILY
COMMUNITY

## 2025-02-11 RX ORDER — DEXMETHYLPHENIDATE HYDROCHLORIDE 15 MG/1
CAPSULE, EXTENDED RELEASE ORAL
COMMUNITY
Start: 2025-01-04

## 2025-02-11 RX ORDER — FLUOXETINE 10 MG/1
10 CAPSULE ORAL EVERY MORNING
COMMUNITY
Start: 2025-02-04

## 2025-02-11 SDOH — ECONOMIC STABILITY: FOOD INSECURITY: WITHIN THE PAST 12 MONTHS, YOU WORRIED THAT YOUR FOOD WOULD RUN OUT BEFORE YOU GOT MONEY TO BUY MORE.: NEVER TRUE

## 2025-02-11 SDOH — ECONOMIC STABILITY: FOOD INSECURITY: WITHIN THE PAST 12 MONTHS, THE FOOD YOU BOUGHT JUST DIDN'T LAST AND YOU DIDN'T HAVE MONEY TO GET MORE.: NEVER TRUE

## 2025-02-11 ASSESSMENT — PATIENT HEALTH QUESTIONNAIRE - PHQ9
SUM OF ALL RESPONSES TO PHQ QUESTIONS 1-9: 0
10. IF YOU CHECKED OFF ANY PROBLEMS, HOW DIFFICULT HAVE THESE PROBLEMS MADE IT FOR YOU TO DO YOUR WORK, TAKE CARE OF THINGS AT HOME, OR GET ALONG WITH OTHER PEOPLE: NOT DIFFICULT AT ALL
SUM OF ALL RESPONSES TO PHQ9 QUESTIONS 1 & 2: 0
SUM OF ALL RESPONSES TO PHQ QUESTIONS 1-9: 0
7. TROUBLE CONCENTRATING ON THINGS, SUCH AS READING THE NEWSPAPER OR WATCHING TELEVISION: NOT AT ALL
1. LITTLE INTEREST OR PLEASURE IN DOING THINGS: NOT AT ALL
5. POOR APPETITE OR OVEREATING: NOT AT ALL
SUM OF ALL RESPONSES TO PHQ QUESTIONS 1-9: 0
3. TROUBLE FALLING OR STAYING ASLEEP: NOT AT ALL
4. FEELING TIRED OR HAVING LITTLE ENERGY: NOT AT ALL
6. FEELING BAD ABOUT YOURSELF - OR THAT YOU ARE A FAILURE OR HAVE LET YOURSELF OR YOUR FAMILY DOWN: NOT AT ALL
9. THOUGHTS THAT YOU WOULD BE BETTER OFF DEAD, OR OF HURTING YOURSELF: NOT AT ALL
SUM OF ALL RESPONSES TO PHQ QUESTIONS 1-9: 0
8. MOVING OR SPEAKING SO SLOWLY THAT OTHER PEOPLE COULD HAVE NOTICED. OR THE OPPOSITE, BEING SO FIGETY OR RESTLESS THAT YOU HAVE BEEN MOVING AROUND A LOT MORE THAN USUAL: NOT AT ALL
2. FEELING DOWN, DEPRESSED OR HOPELESS: NOT AT ALL

## 2025-02-11 NOTE — PROGRESS NOTES
SUBJECTIVE:   29 y.o.  female here for annual exam. Pt with regular menses and no complaints today. Pt with h/o BTL. Pt reports increased irritability and anger prior to menses. Pt has a counselor and pt planning to start prozac.     Review of Systems:  General ROS: negative  Psychological ROS: negative  ENT ROS: negative  Endocrine ROS: negative  Respiratory ROS: no cough, shortness of breath, or wheezing  Cardiovascular ROS: no chest pain or dyspnea on exertion  Gastrointestinal ROS: no abdominal pain, change in bowel habits, or black or bloody stools  Genito-Urinary ROS: no dysuria, trouble voiding, or hematuria  Musculoskeletal ROS: negative  Neurological ROS: no TIA or stroke symptoms  Dermatological ROS: negative    OBJECTIVE:   /74   Ht 1.6 m (5' 3\")   Wt 44.5 kg (98 lb)   LMP 2025 (Exact Date)   BMI 17.36 kg/m²     Physical Exam:  CONSTITUTIONAL: She appears well nourished and developed   NEUROLOGIC: Alert and oriented to time, place and person  NECK: no thyroidmegaly  BREASTS: Bilateral breasts without masses, lesions or nipple discharge  LUNGS: Clear to ascultation bilaterally  CVS: regular rate and rhythm  LYMPHATIC: No palpable lymph nodes  ABDOMEN: benign, soft, nontender, no masses. No liver or splenic organomegaly. No evidence of abdominal or inguinal hernia. No indication for occult blood testing  SKIN: normal texture and tone, no lesions  NEURO: normal tone, no hyperreflexia, 1+DTRs throughout    Pelvic Exam:   EFG: normal external genitalia  URETHRAL MEATUS: normal size, no diverticula   URETHRA: normal appearing without diverticula or lesions  BLADDER:  No masses or tenderness  VAGINA: normal rugae, no discharge   CERVIX: parous, no lesions  UTERUS: uterus is normal size, shape, consistency and nontender   ADNEXA: normal adnexa in size, nontender and no masses.  PERINEUM: normal appearing without lesions or masses  RECTUM: no hemorrhoids or rectal masses.     ASSESSMENT:

## 2025-02-13 LAB
HPV HR 12 DNA SPEC QL NAA+PROBE: NOT DETECTED
HPV16 DNA SPEC QL NAA+PROBE: NOT DETECTED
HPV16+18+H RISK 12 DNA SPEC-IMP: NORMAL
HPV18 DNA SPEC QL NAA+PROBE: NOT DETECTED

## 2025-04-26 ENCOUNTER — HOSPITAL ENCOUNTER (EMERGENCY)
Facility: HOSPITAL | Age: 30
Discharge: HOME | End: 2025-04-26
Attending: EMERGENCY MEDICINE
Payer: COMMERCIAL

## 2025-04-26 ENCOUNTER — APPOINTMENT (OUTPATIENT)
Dept: RADIOLOGY | Facility: HOSPITAL | Age: 30
End: 2025-04-26
Payer: COMMERCIAL

## 2025-04-26 VITALS
BODY MASS INDEX: 17.72 KG/M2 | SYSTOLIC BLOOD PRESSURE: 123 MMHG | OXYGEN SATURATION: 98 % | TEMPERATURE: 98.4 F | HEIGHT: 63 IN | DIASTOLIC BLOOD PRESSURE: 77 MMHG | HEART RATE: 81 BPM | WEIGHT: 100 LBS | RESPIRATION RATE: 18 BRPM

## 2025-04-26 DIAGNOSIS — R11.0 NAUSEA: Primary | ICD-10-CM

## 2025-04-26 DIAGNOSIS — N83.209 CYST OF OVARY, UNSPECIFIED LATERALITY: ICD-10-CM

## 2025-04-26 LAB
ALBUMIN SERPL BCP-MCNC: 4.9 G/DL (ref 3.4–5)
ALP SERPL-CCNC: 41 U/L (ref 33–110)
ALT SERPL W P-5'-P-CCNC: 11 U/L (ref 7–45)
ANION GAP SERPL CALC-SCNC: 14 MMOL/L (ref 10–20)
APPEARANCE UR: CLEAR
AST SERPL W P-5'-P-CCNC: 16 U/L (ref 9–39)
BACTERIA #/AREA URNS AUTO: ABNORMAL /HPF
BASOPHILS # BLD AUTO: 0.03 X10*3/UL (ref 0–0.1)
BASOPHILS NFR BLD AUTO: 0.4 %
BILIRUB SERPL-MCNC: 0.4 MG/DL (ref 0–1.2)
BILIRUB UR STRIP.AUTO-MCNC: NEGATIVE MG/DL
BUN SERPL-MCNC: 34 MG/DL (ref 6–23)
CALCIUM SERPL-MCNC: 9.7 MG/DL (ref 8.6–10.3)
CHLORIDE SERPL-SCNC: 100 MMOL/L (ref 98–107)
CO2 SERPL-SCNC: 26 MMOL/L (ref 21–32)
COLOR UR: ABNORMAL
CREAT SERPL-MCNC: 0.77 MG/DL (ref 0.5–1.05)
EGFRCR SERPLBLD CKD-EPI 2021: >90 ML/MIN/1.73M*2
EOSINOPHIL # BLD AUTO: 0.08 X10*3/UL (ref 0–0.7)
EOSINOPHIL NFR BLD AUTO: 1 %
ERYTHROCYTE [DISTWIDTH] IN BLOOD BY AUTOMATED COUNT: 12.2 % (ref 11.5–14.5)
GLUCOSE SERPL-MCNC: 92 MG/DL (ref 74–99)
GLUCOSE UR STRIP.AUTO-MCNC: NORMAL MG/DL
HCG UR QL IA.RAPID: NEGATIVE
HCT VFR BLD AUTO: 39 % (ref 36–46)
HGB BLD-MCNC: 13 G/DL (ref 12–16)
HYALINE CASTS #/AREA URNS AUTO: ABNORMAL /LPF
IMM GRANULOCYTES # BLD AUTO: 0.01 X10*3/UL (ref 0–0.7)
IMM GRANULOCYTES NFR BLD AUTO: 0.1 % (ref 0–0.9)
KETONES UR STRIP.AUTO-MCNC: NEGATIVE MG/DL
LEUKOCYTE ESTERASE UR QL STRIP.AUTO: NEGATIVE
LIPASE SERPL-CCNC: 41 U/L (ref 9–82)
LYMPHOCYTES # BLD AUTO: 2.65 X10*3/UL (ref 1.2–4.8)
LYMPHOCYTES NFR BLD AUTO: 32.6 %
MCH RBC QN AUTO: 31.4 PG (ref 26–34)
MCHC RBC AUTO-ENTMCNC: 33.3 G/DL (ref 32–36)
MCV RBC AUTO: 94 FL (ref 80–100)
MONOCYTES # BLD AUTO: 0.56 X10*3/UL (ref 0.1–1)
MONOCYTES NFR BLD AUTO: 6.9 %
MUCOUS THREADS #/AREA URNS AUTO: ABNORMAL /LPF
NEUTROPHILS # BLD AUTO: 4.79 X10*3/UL (ref 1.2–7.7)
NEUTROPHILS NFR BLD AUTO: 59 %
NITRITE UR QL STRIP.AUTO: NEGATIVE
NRBC BLD-RTO: 0 /100 WBCS (ref 0–0)
PH UR STRIP.AUTO: 6 [PH]
PLATELET # BLD AUTO: 260 X10*3/UL (ref 150–450)
POTASSIUM SERPL-SCNC: 3.8 MMOL/L (ref 3.5–5.3)
PROT SERPL-MCNC: 7.2 G/DL (ref 6.4–8.2)
PROT UR STRIP.AUTO-MCNC: NEGATIVE MG/DL
RBC # BLD AUTO: 4.14 X10*6/UL (ref 4–5.2)
RBC # UR STRIP.AUTO: ABNORMAL MG/DL
RBC #/AREA URNS AUTO: ABNORMAL /HPF
SODIUM SERPL-SCNC: 136 MMOL/L (ref 136–145)
SP GR UR STRIP.AUTO: 1.03
SQUAMOUS #/AREA URNS AUTO: ABNORMAL /HPF
UROBILINOGEN UR STRIP.AUTO-MCNC: NORMAL MG/DL
WBC # BLD AUTO: 8.1 X10*3/UL (ref 4.4–11.3)
WBC #/AREA URNS AUTO: ABNORMAL /HPF

## 2025-04-26 PROCEDURE — 76830 TRANSVAGINAL US NON-OB: CPT | Performed by: RADIOLOGY

## 2025-04-26 PROCEDURE — 36415 COLL VENOUS BLD VENIPUNCTURE: CPT

## 2025-04-26 PROCEDURE — 85025 COMPLETE CBC W/AUTO DIFF WBC: CPT

## 2025-04-26 PROCEDURE — 2550000001 HC RX 255 CONTRASTS: Performed by: EMERGENCY MEDICINE

## 2025-04-26 PROCEDURE — 93975 VASCULAR STUDY: CPT

## 2025-04-26 PROCEDURE — 96374 THER/PROPH/DIAG INJ IV PUSH: CPT | Mod: 59

## 2025-04-26 PROCEDURE — 74177 CT ABD & PELVIS W/CONTRAST: CPT | Performed by: RADIOLOGY

## 2025-04-26 PROCEDURE — 81001 URINALYSIS AUTO W/SCOPE: CPT

## 2025-04-26 PROCEDURE — 2500000004 HC RX 250 GENERAL PHARMACY W/ HCPCS (ALT 636 FOR OP/ED)

## 2025-04-26 PROCEDURE — 81025 URINE PREGNANCY TEST: CPT | Performed by: EMERGENCY MEDICINE

## 2025-04-26 PROCEDURE — 99285 EMERGENCY DEPT VISIT HI MDM: CPT | Performed by: EMERGENCY MEDICINE

## 2025-04-26 PROCEDURE — 83690 ASSAY OF LIPASE: CPT

## 2025-04-26 PROCEDURE — 80053 COMPREHEN METABOLIC PANEL: CPT

## 2025-04-26 PROCEDURE — 76856 US EXAM PELVIC COMPLETE: CPT | Performed by: RADIOLOGY

## 2025-04-26 PROCEDURE — 76856 US EXAM PELVIC COMPLETE: CPT

## 2025-04-26 PROCEDURE — 99285 EMERGENCY DEPT VISIT HI MDM: CPT | Mod: 25 | Performed by: EMERGENCY MEDICINE

## 2025-04-26 PROCEDURE — 74177 CT ABD & PELVIS W/CONTRAST: CPT

## 2025-04-26 RX ORDER — KETOROLAC TROMETHAMINE 15 MG/ML
15 INJECTION, SOLUTION INTRAMUSCULAR; INTRAVENOUS ONCE
Status: COMPLETED | OUTPATIENT
Start: 2025-04-26 | End: 2025-04-26

## 2025-04-26 RX ORDER — ONDANSETRON 4 MG/1
4 TABLET, ORALLY DISINTEGRATING ORAL ONCE
Status: COMPLETED | OUTPATIENT
Start: 2025-04-26 | End: 2025-04-26

## 2025-04-26 RX ORDER — ONDANSETRON 4 MG/1
4 TABLET, FILM COATED ORAL EVERY 6 HOURS
Qty: 12 TABLET | Refills: 0 | Status: SHIPPED | OUTPATIENT
Start: 2025-04-26 | End: 2025-04-29

## 2025-04-26 RX ADMIN — IOHEXOL 70 ML: 350 INJECTION, SOLUTION INTRAVENOUS at 20:23

## 2025-04-26 RX ADMIN — KETOROLAC TROMETHAMINE 15 MG: 15 INJECTION, SOLUTION INTRAMUSCULAR; INTRAVENOUS at 20:03

## 2025-04-26 RX ADMIN — ONDANSETRON 4 MG: 4 TABLET, ORALLY DISINTEGRATING ORAL at 20:04

## 2025-04-26 ASSESSMENT — COLUMBIA-SUICIDE SEVERITY RATING SCALE - C-SSRS
6. HAVE YOU EVER DONE ANYTHING, STARTED TO DO ANYTHING, OR PREPARED TO DO ANYTHING TO END YOUR LIFE?: NO
2. HAVE YOU ACTUALLY HAD ANY THOUGHTS OF KILLING YOURSELF?: NO
1. IN THE PAST MONTH, HAVE YOU WISHED YOU WERE DEAD OR WISHED YOU COULD GO TO SLEEP AND NOT WAKE UP?: NO

## 2025-04-26 ASSESSMENT — LIFESTYLE VARIABLES
HAVE YOU EVER FELT YOU SHOULD CUT DOWN ON YOUR DRINKING: NO
EVER FELT BAD OR GUILTY ABOUT YOUR DRINKING: NO
HAVE PEOPLE ANNOYED YOU BY CRITICIZING YOUR DRINKING: NO
EVER HAD A DRINK FIRST THING IN THE MORNING TO STEADY YOUR NERVES TO GET RID OF A HANGOVER: NO
TOTAL SCORE: 0

## 2025-04-26 ASSESSMENT — PAIN - FUNCTIONAL ASSESSMENT: PAIN_FUNCTIONAL_ASSESSMENT: 0-10

## 2025-04-26 ASSESSMENT — PAIN SCALES - GENERAL: PAINLEVEL_OUTOF10: 6

## 2025-04-26 NOTE — ED PROVIDER NOTES
History of Present Illness     History provided by: Patient  Limitations to History: None    HPI:  Shivani Lazaro is a 29 y.o. 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  She was previously feeling well, however woke up around 4 PM from a nap with the severe, throbbing right side pain associated with shooting pains down her leg..  Initially she thought she had to have a bowel movement, did pass a hard stool but the pain did not improve.  The pain does come and go, does seem to be worsened by certain positions such as twisting and side bending.  She also initially had periumbilical pain that seem to move to the right side.  She began to feel nauseous but did not have any vomiting, no diarrhea dysuria, hematuria.  No other chest pain, shortness of breath, fevers, chills, body aches, other associated symptoms.  She has had a normal appetite.  She took ibuprofen at that time which did not change symptoms.  She has had tubal ligation, report no chance of pregnancy.  She has had kidney stones in the past which did not feel this way.  She was not lifting heavily or doing any new physical exertion recently, does not feel she pulled any muscles.  No sick contacts, does not smoke, drink, or use substances.    Physical Exam   Triage vitals:  T 36.9 °C (98.4 °F)  HR 69  /90  RR 20  O2 98 % None (Room air)    Physical Exam  Constitutional:       Appearance: She is not ill-appearing.   Eyes:      General: No scleral icterus.     Conjunctiva/sclera: Conjunctivae normal.   Cardiovascular:      Rate and Rhythm: Normal rate and regular rhythm.   Pulmonary:      Effort: Pulmonary effort is normal.      Breath sounds: No wheezing, rhonchi or rales.   Abdominal:      General: Abdomen is flat.      Tenderness: There is abdominal tenderness in the right lower quadrant. There is right CVA tenderness. There is no left CVA  tenderness, guarding or rebound. Negative signs include Choi's sign.   Musculoskeletal:      Right lower leg: No edema.      Left lower leg: No edema.      Comments: Significant tenderness to palpation of the right lumbar paraspinal muscles   Skin:     General: Skin is warm and dry.      Coloration: Skin is not jaundiced.   Neurological:      General: No focal deficit present.      Mental Status: She is alert.   Psychiatric:         Mood and Affect: Mood normal.          Medical Decision Making & ED Course   Medical Decision Makin y.o. female with remote history of Hodgkin's lymphoma and nephrolithiasis presenting for flank pain that is positional and radiates down her leg.  Initial vitals within normal limits without fever, tachycardia, hypotension.  We obtained labs with unremarkable CBC without leukocytosis or anemia, hemoglobin 13.0.  CMP with stable elevated BUN at 34 but otherwise unremarkable.  Urine shows +1 bacteria and trace blood but otherwise negative.  Lipase and hCG negative.  Given history of kidney stones that felt dissimilar and her right lower quadrant pain, we did obtain a CT abdomen pelvis which did not show kidney stone but did show an approximately 1.8 cm ovarian cyst.  Pelvic ultrasound showed Subcentimeter vascular hyperechoic lesion along the posterior aspect of the endometrium and complex cystic lesion in the right ovary measuring up to 1.8 cm.  She did receive a dose of Toradol and Zofran with improvement in her symptoms, was able to tolerate snacks and fluids afterwards.  Most likely her pain stems from an ovarian cyst which may be hemorrhagic.  We recommended follow-up with OB/GYN outpatient when able for further evaluation of this as well as the vascular hypoechoic lesion noted on ultrasound.  Did send a prescription for Zofran and recommended supportive care for discomfort.  Discussed return precautions, and the patient was discharged in stable condition.  ----    Differential  diagnoses considered include but are not limited to: Ovarian torsion not visualized on ultrasound,     Social Determinants of Health which Significantly Impact Care: None identified     EKG Independent Interpretation: EKG not obtained    Independent Result Review and Interpretation: Relevant laboratory and radiographic results were reviewed and independently interpreted by myself.  As necessary, they are commented on in the ED Course.    Chronic conditions affecting the patient's care: As documented above in MDM    The patient was discussed with the following consultants/services: None    Care Considerations: As documented above in MDM    ED Course:  Diagnoses as of 04/26/25 2317   Nausea     Disposition   Discharged home in stable condition with OB/GYN follow-up    Procedures   Procedures    Patient seen and discussed with ED attending physician.    Dary Thurston DO  Family Medicine PGY-2       Dary Thurston DO  Resident  04/26/25 0404

## 2025-04-27 NOTE — DISCHARGE INSTRUCTIONS
Thank you for coming to our emergency room.  You were seen for right sided pain and found to have a cyst on your ovary which is likely causing your discomfort.  You can take ibuprofen and Tylenol to help with discomfort.  A prescription for Zofran was sent to your pharmacy which can help with nausea.  Please make an appointment with your OB/GYN as soon as possible to discuss this ER visit and next steps.  Please come back to the emergency room if you develop severe persistent abdominal pain, inability to tolerate eating and drinking, or any new concerning symptoms.  Take care!

## 2025-05-02 ENCOUNTER — APPOINTMENT (OUTPATIENT)
Dept: RADIOLOGY | Facility: HOSPITAL | Age: 30
End: 2025-05-02
Payer: COMMERCIAL

## 2025-05-02 ENCOUNTER — HOSPITAL ENCOUNTER (EMERGENCY)
Facility: HOSPITAL | Age: 30
Discharge: HOME | End: 2025-05-03
Attending: EMERGENCY MEDICINE
Payer: COMMERCIAL

## 2025-05-02 DIAGNOSIS — Z04.1 EXAM FOLLOWING MVC (MOTOR VEHICLE COLLISION), NO APPARENT INJURY: Primary | ICD-10-CM

## 2025-05-02 PROCEDURE — 99285 EMERGENCY DEPT VISIT HI MDM: CPT | Performed by: EMERGENCY MEDICINE

## 2025-05-02 PROCEDURE — 36415 COLL VENOUS BLD VENIPUNCTURE: CPT | Performed by: STUDENT IN AN ORGANIZED HEALTH CARE EDUCATION/TRAINING PROGRAM

## 2025-05-02 PROCEDURE — 86901 BLOOD TYPING SEROLOGIC RH(D): CPT | Performed by: STUDENT IN AN ORGANIZED HEALTH CARE EDUCATION/TRAINING PROGRAM

## 2025-05-02 PROCEDURE — 2500000001 HC RX 250 WO HCPCS SELF ADMINISTERED DRUGS (ALT 637 FOR MEDICARE OP): Performed by: STUDENT IN AN ORGANIZED HEALTH CARE EDUCATION/TRAINING PROGRAM

## 2025-05-02 PROCEDURE — 80053 COMPREHEN METABOLIC PANEL: CPT | Performed by: STUDENT IN AN ORGANIZED HEALTH CARE EDUCATION/TRAINING PROGRAM

## 2025-05-02 PROCEDURE — 83605 ASSAY OF LACTIC ACID: CPT | Performed by: STUDENT IN AN ORGANIZED HEALTH CARE EDUCATION/TRAINING PROGRAM

## 2025-05-02 PROCEDURE — 99285 EMERGENCY DEPT VISIT HI MDM: CPT

## 2025-05-02 PROCEDURE — 85025 COMPLETE CBC W/AUTO DIFF WBC: CPT | Performed by: STUDENT IN AN ORGANIZED HEALTH CARE EDUCATION/TRAINING PROGRAM

## 2025-05-02 PROCEDURE — 99291 CRITICAL CARE FIRST HOUR: CPT | Performed by: EMERGENCY MEDICINE

## 2025-05-02 PROCEDURE — 82077 ASSAY SPEC XCP UR&BREATH IA: CPT | Performed by: STUDENT IN AN ORGANIZED HEALTH CARE EDUCATION/TRAINING PROGRAM

## 2025-05-02 PROCEDURE — 86850 RBC ANTIBODY SCREEN: CPT | Performed by: STUDENT IN AN ORGANIZED HEALTH CARE EDUCATION/TRAINING PROGRAM

## 2025-05-02 PROCEDURE — 85610 PROTHROMBIN TIME: CPT | Performed by: STUDENT IN AN ORGANIZED HEALTH CARE EDUCATION/TRAINING PROGRAM

## 2025-05-02 RX ORDER — ONDANSETRON HYDROCHLORIDE 2 MG/ML
4 INJECTION, SOLUTION INTRAVENOUS ONCE
Status: DISCONTINUED | OUTPATIENT
Start: 2025-05-02 | End: 2025-05-03 | Stop reason: HOSPADM

## 2025-05-02 RX ORDER — ACETAMINOPHEN 325 MG/1
650 TABLET ORAL ONCE
Status: COMPLETED | OUTPATIENT
Start: 2025-05-02 | End: 2025-05-02

## 2025-05-02 RX ORDER — ONDANSETRON 4 MG/1
4 TABLET, ORALLY DISINTEGRATING ORAL ONCE
Status: DISCONTINUED | OUTPATIENT
Start: 2025-05-02 | End: 2025-05-03 | Stop reason: HOSPADM

## 2025-05-02 RX ADMIN — ACETAMINOPHEN 650 MG: 325 TABLET ORAL at 23:42

## 2025-05-02 ASSESSMENT — LIFESTYLE VARIABLES
HAVE YOU EVER FELT YOU SHOULD CUT DOWN ON YOUR DRINKING: NO
EVER HAD A DRINK FIRST THING IN THE MORNING TO STEADY YOUR NERVES TO GET RID OF A HANGOVER: NO
HAVE PEOPLE ANNOYED YOU BY CRITICIZING YOUR DRINKING: NO
EVER FELT BAD OR GUILTY ABOUT YOUR DRINKING: NO
TOTAL SCORE: 0

## 2025-05-02 ASSESSMENT — PAIN SCALES - GENERAL: PAINLEVEL_OUTOF10: 8

## 2025-05-02 ASSESSMENT — PAIN - FUNCTIONAL ASSESSMENT: PAIN_FUNCTIONAL_ASSESSMENT: 0-10

## 2025-05-02 NOTE — Clinical Note
Shivani Lazaro was seen and treated in our emergency department on 5/2/2025.  She may return to work on 05/05/2025.       If you have any questions or concerns, please don't hesitate to call.      Jairo Salcedo, DO

## 2025-05-03 ENCOUNTER — APPOINTMENT (OUTPATIENT)
Dept: RADIOLOGY | Facility: HOSPITAL | Age: 30
End: 2025-05-03
Payer: COMMERCIAL

## 2025-05-03 VITALS
TEMPERATURE: 97.9 F | SYSTOLIC BLOOD PRESSURE: 122 MMHG | HEART RATE: 79 BPM | WEIGHT: 95 LBS | BODY MASS INDEX: 16.83 KG/M2 | RESPIRATION RATE: 20 BRPM | HEIGHT: 63 IN | OXYGEN SATURATION: 99 % | DIASTOLIC BLOOD PRESSURE: 86 MMHG

## 2025-05-03 LAB
ABO GROUP (TYPE) IN BLOOD: NORMAL
ALBUMIN SERPL BCP-MCNC: 4.5 G/DL (ref 3.4–5)
ALP SERPL-CCNC: 40 U/L (ref 33–110)
ALT SERPL W P-5'-P-CCNC: 10 U/L (ref 7–45)
ANION GAP SERPL CALC-SCNC: 12 MMOL/L (ref 10–20)
ANTIBODY SCREEN: NORMAL
AST SERPL W P-5'-P-CCNC: 16 U/L (ref 9–39)
BASOPHILS # BLD AUTO: 0.02 X10*3/UL (ref 0–0.1)
BASOPHILS NFR BLD AUTO: 0.3 %
BILIRUB SERPL-MCNC: 0.3 MG/DL (ref 0–1.2)
BUN SERPL-MCNC: 36 MG/DL (ref 6–23)
CALCIUM SERPL-MCNC: 9.1 MG/DL (ref 8.6–10.3)
CHLORIDE SERPL-SCNC: 105 MMOL/L (ref 98–107)
CO2 SERPL-SCNC: 23 MMOL/L (ref 21–32)
CREAT SERPL-MCNC: 0.83 MG/DL (ref 0.5–1.05)
EGFRCR SERPLBLD CKD-EPI 2021: >90 ML/MIN/1.73M*2
EOSINOPHIL # BLD AUTO: 0.06 X10*3/UL (ref 0–0.7)
EOSINOPHIL NFR BLD AUTO: 0.9 %
ERYTHROCYTE [DISTWIDTH] IN BLOOD BY AUTOMATED COUNT: 11.9 % (ref 11.5–14.5)
ETHANOL SERPL-MCNC: <10 MG/DL
GLUCOSE SERPL-MCNC: 91 MG/DL (ref 74–99)
HCT VFR BLD AUTO: 36.1 % (ref 36–46)
HGB BLD-MCNC: 11.9 G/DL (ref 12–16)
IMM GRANULOCYTES # BLD AUTO: 0.01 X10*3/UL (ref 0–0.7)
IMM GRANULOCYTES NFR BLD AUTO: 0.1 % (ref 0–0.9)
INR PPP: 1.1 (ref 0.9–1.1)
LACTATE SERPL-SCNC: 0.5 MMOL/L (ref 0.4–2)
LYMPHOCYTES # BLD AUTO: 3.1 X10*3/UL (ref 1.2–4.8)
LYMPHOCYTES NFR BLD AUTO: 44.5 %
MCH RBC QN AUTO: 30.8 PG (ref 26–34)
MCHC RBC AUTO-ENTMCNC: 33 G/DL (ref 32–36)
MCV RBC AUTO: 94 FL (ref 80–100)
MONOCYTES # BLD AUTO: 0.41 X10*3/UL (ref 0.1–1)
MONOCYTES NFR BLD AUTO: 5.9 %
NEUTROPHILS # BLD AUTO: 3.37 X10*3/UL (ref 1.2–7.7)
NEUTROPHILS NFR BLD AUTO: 48.3 %
NRBC BLD-RTO: 0 /100 WBCS (ref 0–0)
PLATELET # BLD AUTO: 218 X10*3/UL (ref 150–450)
POTASSIUM SERPL-SCNC: 3.5 MMOL/L (ref 3.5–5.3)
PROT SERPL-MCNC: 6.6 G/DL (ref 6.4–8.2)
PROTHROMBIN TIME: 11.8 SECONDS (ref 9.8–12.4)
RBC # BLD AUTO: 3.86 X10*6/UL (ref 4–5.2)
RH FACTOR (ANTIGEN D): NORMAL
SODIUM SERPL-SCNC: 136 MMOL/L (ref 136–145)
WBC # BLD AUTO: 7 X10*3/UL (ref 4.4–11.3)

## 2025-05-03 PROCEDURE — 72125 CT NECK SPINE W/O DYE: CPT | Performed by: RADIOLOGY

## 2025-05-03 PROCEDURE — 74177 CT ABD & PELVIS W/CONTRAST: CPT | Performed by: RADIOLOGY

## 2025-05-03 PROCEDURE — 71260 CT THORAX DX C+: CPT | Performed by: RADIOLOGY

## 2025-05-03 PROCEDURE — 72125 CT NECK SPINE W/O DYE: CPT

## 2025-05-03 PROCEDURE — 2500000004 HC RX 250 GENERAL PHARMACY W/ HCPCS (ALT 636 FOR OP/ED): Mod: JZ | Performed by: STUDENT IN AN ORGANIZED HEALTH CARE EDUCATION/TRAINING PROGRAM

## 2025-05-03 PROCEDURE — 73562 X-RAY EXAM OF KNEE 3: CPT | Mod: LEFT SIDE | Performed by: RADIOLOGY

## 2025-05-03 PROCEDURE — 73502 X-RAY EXAM HIP UNI 2-3 VIEWS: CPT | Mod: LT

## 2025-05-03 PROCEDURE — 70450 CT HEAD/BRAIN W/O DYE: CPT

## 2025-05-03 PROCEDURE — 90471 IMMUNIZATION ADMIN: CPT | Performed by: STUDENT IN AN ORGANIZED HEALTH CARE EDUCATION/TRAINING PROGRAM

## 2025-05-03 PROCEDURE — 72128 CT CHEST SPINE W/O DYE: CPT | Performed by: RADIOLOGY

## 2025-05-03 PROCEDURE — 74177 CT ABD & PELVIS W/CONTRAST: CPT

## 2025-05-03 PROCEDURE — 70450 CT HEAD/BRAIN W/O DYE: CPT | Performed by: RADIOLOGY

## 2025-05-03 PROCEDURE — 73030 X-RAY EXAM OF SHOULDER: CPT | Mod: LEFT SIDE | Performed by: RADIOLOGY

## 2025-05-03 PROCEDURE — 73030 X-RAY EXAM OF SHOULDER: CPT | Mod: LT

## 2025-05-03 PROCEDURE — 72131 CT LUMBAR SPINE W/O DYE: CPT | Performed by: RADIOLOGY

## 2025-05-03 PROCEDURE — 90715 TDAP VACCINE 7 YRS/> IM: CPT | Mod: JZ | Performed by: STUDENT IN AN ORGANIZED HEALTH CARE EDUCATION/TRAINING PROGRAM

## 2025-05-03 PROCEDURE — 73564 X-RAY EXAM KNEE 4 OR MORE: CPT | Mod: LT

## 2025-05-03 PROCEDURE — 73502 X-RAY EXAM HIP UNI 2-3 VIEWS: CPT | Mod: LEFT SIDE | Performed by: RADIOLOGY

## 2025-05-03 PROCEDURE — 2550000001 HC RX 255 CONTRASTS: Performed by: EMERGENCY MEDICINE

## 2025-05-03 RX ADMIN — IOHEXOL 90 ML: 350 INJECTION, SOLUTION INTRAVENOUS at 00:09

## 2025-05-03 RX ADMIN — TETANUS TOXOID, REDUCED DIPHTHERIA TOXOID AND ACELLULAR PERTUSSIS VACCINE, ADSORBED 0.5 ML: 5; 2.5; 8; 8; 2.5 SUSPENSION INTRAMUSCULAR at 00:42
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
## 2025-05-03 NOTE — DISCHARGE INSTRUCTIONS
Follow-up with your primary care physician.  Seek immediate medical attention with any worsening symptoms, chest pain, shortness of breath, abdominal pain, nausea, vomiting or for any reason

## 2025-07-15 ENCOUNTER — APPOINTMENT (OUTPATIENT)
Dept: CARDIOLOGY | Facility: CLINIC | Age: 30
End: 2025-07-15
Payer: COMMERCIAL

## 2025-07-15 VITALS
BODY MASS INDEX: 17.58 KG/M2 | WEIGHT: 99.2 LBS | SYSTOLIC BLOOD PRESSURE: 112 MMHG | DIASTOLIC BLOOD PRESSURE: 70 MMHG | HEIGHT: 63 IN | HEART RATE: 62 BPM

## 2025-07-15 DIAGNOSIS — I47.11 INAPPROPRIATE SINUS TACHYCARDIA: ICD-10-CM

## 2025-07-15 PROCEDURE — 3008F BODY MASS INDEX DOCD: CPT | Performed by: INTERNAL MEDICINE

## 2025-07-15 PROCEDURE — 99213 OFFICE O/P EST LOW 20 MIN: CPT | Performed by: INTERNAL MEDICINE

## 2025-07-15 PROCEDURE — 1036F TOBACCO NON-USER: CPT | Performed by: INTERNAL MEDICINE

## 2025-07-15 RX ORDER — METHYLPHENIDATE HYDROCHLORIDE 20 MG/1
1 CAPSULE, EXTENDED RELEASE ORAL
COMMUNITY
Start: 2025-07-07

## 2025-07-15 RX ORDER — FLUOXETINE 20 MG/1
1 CAPSULE ORAL
COMMUNITY
Start: 2025-07-09

## 2025-07-15 NOTE — PROGRESS NOTES
Patient:  Shivani Lazaro  YOB: 1995  MRN: [id number]       Impression/Plan:     Diagnoses and all orders for this visit:  Inappropriate sinus tachycardia  -     With change of ADHD medication, appropriate eating habits and hydration her symptoms inappropriate sinus tachycardia have resolved she will return here on an as-needed basis      Chief Complaint/Active Symptoms:      Chief Complaint   Patient presents with    Follow-up     Patient presents today for a 6 month follow up.        Shivani Lazaro is a 29 y.o. female who presents with nodular sclerosing Hodgkin's lymphoma in 2014 with chemotherapy that did include doxorubicin.  She does have inappropriate sinus tachycardia on monitoring.         12/23/2024 echocardiogram          1. Normal Echocardiogram.   2. The left ventricular systolic function is normal, with a visually estimated ejection fraction of 70%.   3. There is normal right ventricular global systolic function.   4. RVSP could not be calculated as no significant TR Doppler envelope.   5. Normal valve structure and function.            Last seen her on 1/14/2025 and follow-up.  She had been seen by Corrine Patton NP as new patient December 2020 for after a emergency room visit for palpitation lightheadedness with subsequent evaluation showing inappropriate sinus tachycardia on monitoring and normal echocardiography.  Laboratories have been unremarkable at that time.  At time of that office visit she had no cardiovascular symptoms.  She works weekends as a  at [employer] and no symptoms when she is active.  Occasional skipping heart rates.  Also describes some increased heart rate particularly when she was relatively dehydrated and was not eating and drinking as regularly as she could when she was working.  I suggested rather than medication to slow the heart rates she maintain more adequate hydration walk 20 minutes a day could consider support stockings but her symptoms  were quite minimal when I had seen her last.  She is also on Ritalin for ADHD contributing to the tachycardia.    She denies angina, dyspnea, palpitation, edema, lightheadedness or syncope.  She has had no symptoms of claudication or neurologic deterioration.  There have been no hospitalizations or emergency room visits since last office visit.    She has been eating regularly maintaining good hydration exercising regularly and her sensation of increased heart rate have completely resolved.  She also was taken off the Focalin XR and now takes Ritalin which has less of a tachycardic effect compared to the other agent and she is doing quite well.    Review of Systems: Unremarkable except as noted above    Meds     Current Outpatient Medications   Medication Instructions    azithromycin (Zithromax) 250 mg tablet 2 tabs po day 1; 1 tab po every day days 2-5    azithromycin (Zithromax) 250 mg tablet 2 tabs po day 1; 1 tab po every day days 2-5    brompheniramine-pseudoeph-DM (Bromfed DM) 2-30-10 mg/5 mL syrup 5 mL, oral, Every 4 hours PRN    brompheniramine-pseudoeph-DM (Bromfed DM) 2-30-10 mg/5 mL syrup 5 mL, oral, Every 4 hours PRN    dexmethylphenidate XR (FOCALIN XR) 15 mg, Daily    FLUoxetine (PROzac) 20 mg capsule 1 capsule, Daily (0630)    methylphenidate LA (Ritalin LA) 20 mg 24 hr capsule 1 capsule, Daily (0630)        Allergies   Allergies[1]      Annotated Problems     Specialty Problems          Cardiology Problems    Aortic regurgitation    Endocarditis, bacterial, acute/subacute ([facility])    Tachycardia        Problem List     Patient Active Problem List    Diagnosis Date Noted    Tachycardia 12/02/2024    Septic shock due to Pseudomonas species (Multi) 12/02/2024    Lymphadenopathy 12/02/2024    Endocarditis, bacterial, acute/subacute ([facility]) 12/02/2024    Aortic regurgitation 12/02/2024       Objective:     Vitals:    07/15/25 1300   BP: 112/70   BP Location: Left arm   Patient Position: Sitting  "  Pulse: 62   Weight: (!) 45 kg (99 lb 3.2 oz)   Height: 1.6 m (5' 3\")      Wt Readings from Last 4 Encounters:   07/15/25 (!) 45 kg (99 lb 3.2 oz)   05/02/25 (!) 43.1 kg (95 lb)   04/26/25 45.4 kg (100 lb)   02/04/25 47.6 kg (105 lb)           LAB:     Lab Results   Component Value Date    WBC 7.0 05/02/2025    HGB 11.9 (L) 05/02/2025    HCT 36.1 05/02/2025     05/02/2025    ALT 10 05/02/2025    AST 16 05/02/2025     05/02/2025    K 3.5 05/02/2025     05/02/2025    CREATININE 0.83 05/02/2025    BUN 36 (H) 05/02/2025    CO2 23 05/02/2025    TSH 1.16 11/20/2024    INR 1.1 05/02/2025         Physical Exam     General Appearance: alert and oriented to person, place and time, in no acute distress  Cardiovascular: normal rate, regular rhythm, normal S1 and S2, no murmurs, rubs, clicks, or gallops,  no JVD  Pulmonary/Chest: clear to auscultation bilaterally- no wheezes, rales or rhonchi, normal air movement, no respiratory distress  Abdomen: soft, non-tender, non-distended, normal bowel sounds, no masses   Extremities: no cyanosis, clubbing or edema  Skin: warm and dry, no rash or erythema  Eyes: EOMI  Neck: supple and non-tender without mass, no thyromegaly   Neurological: alert, oriented, normal speech, no focal findings or movement disorder noted  Vascular:       Provider attestation-scribe documentation  Any medical record entries made by the scribe were at my discretion and personally dictated by me.  I have reviewed the chart and agree that the record accurately reflects my personal performance of the history, physical exam, discussion and plan.      Scribe Attestation  By signing my name below, I, Yin Vieira MA, Scribe   attest that this documentation has been prepared under the direction and in the presence of Karthik Cuellar MD.            [1]   Allergies  Allergen Reactions    Penicillins Hives and Rash     "

## (undated) DEVICE — STERILE NEOPRENE POWDER-FREE SURGICAL GLOVES WITH NITRILE COATING: Brand: PROTEXIS

## (undated) DEVICE — DRESSING TELFA STRL 3X6

## (undated) DEVICE — ISLAND DRESSING 2IN X 3IN: Brand: SILVERLON ANTIMICROBIAL WOUND DRESSING

## (undated) DEVICE — SUTURE VCRL SZ 1 L36IN ABSRB UD L36MM CT-1 1/2 CIR J947H

## (undated) DEVICE — STANDARD SURGICAL GOWN, L: Brand: CONVERTORS